# Patient Record
Sex: MALE | Race: WHITE | NOT HISPANIC OR LATINO | Employment: FULL TIME | URBAN - METROPOLITAN AREA
[De-identification: names, ages, dates, MRNs, and addresses within clinical notes are randomized per-mention and may not be internally consistent; named-entity substitution may affect disease eponyms.]

---

## 2017-01-20 ENCOUNTER — ALLSCRIPTS OFFICE VISIT (OUTPATIENT)
Dept: OTHER | Facility: OTHER | Age: 57
End: 2017-01-20

## 2017-01-27 ENCOUNTER — ALLSCRIPTS OFFICE VISIT (OUTPATIENT)
Dept: RADIOLOGY | Facility: CLINIC | Age: 57
End: 2017-01-27
Payer: COMMERCIAL

## 2017-01-27 PROCEDURE — 77003 FLUOROGUIDE FOR SPINE INJECT: CPT | Performed by: ANESTHESIOLOGY

## 2017-02-03 ENCOUNTER — ALLSCRIPTS OFFICE VISIT (OUTPATIENT)
Dept: OTHER | Facility: OTHER | Age: 57
End: 2017-02-03

## 2017-02-03 DIAGNOSIS — M54.42 LOW BACK PAIN WITH LEFT-SIDED SCIATICA: ICD-10-CM

## 2017-02-10 ENCOUNTER — ALLSCRIPTS OFFICE VISIT (OUTPATIENT)
Dept: RADIOLOGY | Facility: CLINIC | Age: 57
End: 2017-02-10
Payer: COMMERCIAL

## 2017-02-10 PROCEDURE — 77003 FLUOROGUIDE FOR SPINE INJECT: CPT | Performed by: ANESTHESIOLOGY

## 2017-02-17 ENCOUNTER — HOSPITAL ENCOUNTER (OUTPATIENT)
Dept: MRI IMAGING | Facility: HOSPITAL | Age: 57
Discharge: HOME | End: 2017-02-17
Attending: ANESTHESIOLOGY
Payer: COMMERCIAL

## 2017-02-17 ENCOUNTER — GENERIC CONVERSION - ENCOUNTER (OUTPATIENT)
Dept: OTHER | Facility: OTHER | Age: 57
End: 2017-02-17

## 2017-02-17 DIAGNOSIS — M54.42 LOW BACK PAIN WITH LEFT-SIDED SCIATICA: ICD-10-CM

## 2017-02-17 PROCEDURE — 72148 MRI LUMBAR SPINE W/O DYE: CPT

## 2017-02-20 ENCOUNTER — GENERIC CONVERSION - ENCOUNTER (OUTPATIENT)
Dept: OTHER | Facility: OTHER | Age: 57
End: 2017-02-20

## 2017-02-22 ENCOUNTER — GENERIC CONVERSION - ENCOUNTER (OUTPATIENT)
Dept: OTHER | Facility: OTHER | Age: 57
End: 2017-02-22

## 2017-02-24 ENCOUNTER — ALLSCRIPTS OFFICE VISIT (OUTPATIENT)
Dept: RADIOLOGY | Facility: CLINIC | Age: 57
End: 2017-02-24
Payer: COMMERCIAL

## 2017-03-10 ENCOUNTER — GENERIC CONVERSION - ENCOUNTER (OUTPATIENT)
Dept: OTHER | Facility: OTHER | Age: 57
End: 2017-03-10

## 2017-03-10 ENCOUNTER — HOSPITAL ENCOUNTER (OUTPATIENT)
Dept: RADIOLOGY | Facility: HOSPITAL | Age: 57
Discharge: HOME | End: 2017-03-10
Payer: COMMERCIAL

## 2017-03-10 ENCOUNTER — ALLSCRIPTS OFFICE VISIT (OUTPATIENT)
Dept: OTHER | Facility: OTHER | Age: 57
End: 2017-03-10

## 2017-03-10 ENCOUNTER — TRANSCRIBE ORDERS (OUTPATIENT)
Dept: ADMINISTRATIVE | Facility: HOSPITAL | Age: 57
End: 2017-03-10

## 2017-03-10 DIAGNOSIS — J06.9 ACUTE UPPER RESPIRATORY INFECTION: ICD-10-CM

## 2017-03-10 DIAGNOSIS — F17.200 TOBACCO USE DISORDER: ICD-10-CM

## 2017-03-10 DIAGNOSIS — R05.9 COUGH: Primary | ICD-10-CM

## 2017-03-10 PROCEDURE — 71020 HB CHEST X-RAY 2VW FRONTAL&LATL: CPT

## 2017-03-13 ENCOUNTER — GENERIC CONVERSION - ENCOUNTER (OUTPATIENT)
Dept: OTHER | Facility: OTHER | Age: 57
End: 2017-03-13

## 2017-03-31 ENCOUNTER — GENERIC CONVERSION - ENCOUNTER (OUTPATIENT)
Dept: OTHER | Facility: OTHER | Age: 57
End: 2017-03-31

## 2017-03-31 ENCOUNTER — HOSPITAL ENCOUNTER (OUTPATIENT)
Dept: PULMONOLOGY | Facility: HOSPITAL | Age: 57
Discharge: HOME | End: 2017-03-31
Payer: COMMERCIAL

## 2017-03-31 DIAGNOSIS — F17.200 TOBACCO USE DISORDER: ICD-10-CM

## 2017-03-31 DIAGNOSIS — R05.9 COUGH: ICD-10-CM

## 2017-03-31 PROCEDURE — 94760 N-INVAS EAR/PLS OXIMETRY 1: CPT

## 2017-03-31 PROCEDURE — 94729 DIFFUSING CAPACITY: CPT

## 2017-03-31 PROCEDURE — 94726 PLETHYSMOGRAPHY LUNG VOLUMES: CPT

## 2017-03-31 PROCEDURE — 94060 EVALUATION OF WHEEZING: CPT

## 2017-03-31 RX ORDER — ALBUTEROL SULFATE 2.5 MG/3ML
2.5 SOLUTION RESPIRATORY (INHALATION) ONCE
Status: DISCONTINUED | OUTPATIENT
Start: 2017-03-31 | End: 2017-04-04 | Stop reason: HOSPADM

## 2017-04-03 ENCOUNTER — GENERIC CONVERSION - ENCOUNTER (OUTPATIENT)
Dept: OTHER | Facility: OTHER | Age: 57
End: 2017-04-03

## 2017-04-13 ENCOUNTER — ALLSCRIPTS OFFICE VISIT (OUTPATIENT)
Dept: OTHER | Facility: OTHER | Age: 57
End: 2017-04-13

## 2017-04-19 ENCOUNTER — GENERIC CONVERSION - ENCOUNTER (OUTPATIENT)
Dept: OTHER | Facility: OTHER | Age: 57
End: 2017-04-19

## 2017-04-28 ENCOUNTER — ALLSCRIPTS OFFICE VISIT (OUTPATIENT)
Dept: OTHER | Facility: OTHER | Age: 57
End: 2017-04-28

## 2017-04-28 DIAGNOSIS — M54.42 LOW BACK PAIN WITH LEFT-SIDED SCIATICA: ICD-10-CM

## 2017-04-28 DIAGNOSIS — J44.9 CHRONIC OBSTRUCTIVE PULMONARY DISEASE (HCC): ICD-10-CM

## 2017-04-28 DIAGNOSIS — F17.200 NICOTINE DEPENDENCE, UNCOMPLICATED: ICD-10-CM

## 2017-04-28 DIAGNOSIS — M47.26 OTHER SPONDYLOSIS WITH RADICULOPATHY, LUMBAR REGION: ICD-10-CM

## 2017-04-28 DIAGNOSIS — M48.02 SPINAL STENOSIS OF CERVICAL REGION: ICD-10-CM

## 2017-05-09 ENCOUNTER — ALLSCRIPTS OFFICE VISIT (OUTPATIENT)
Dept: OTHER | Facility: OTHER | Age: 57
End: 2017-05-09

## 2017-05-19 ENCOUNTER — HOSPITAL ENCOUNTER (OUTPATIENT)
Dept: RADIOLOGY | Facility: HOSPITAL | Age: 57
Discharge: HOME | End: 2017-05-19
Attending: NEUROLOGICAL SURGERY
Payer: COMMERCIAL

## 2017-05-19 ENCOUNTER — LAB REQUISITION (OUTPATIENT)
Dept: LAB | Facility: HOSPITAL | Age: 57
End: 2017-05-19
Payer: COMMERCIAL

## 2017-05-19 ENCOUNTER — TRANSCRIBE ORDERS (OUTPATIENT)
Dept: ADMINISTRATIVE | Facility: HOSPITAL | Age: 57
End: 2017-05-19

## 2017-05-19 ENCOUNTER — ALLSCRIPTS OFFICE VISIT (OUTPATIENT)
Dept: OTHER | Facility: OTHER | Age: 57
End: 2017-05-19

## 2017-05-19 DIAGNOSIS — F17.200 NICOTINE DEPENDENCE, UNCOMPLICATED: ICD-10-CM

## 2017-05-19 DIAGNOSIS — M54.42 LOW BACK PAIN WITH LEFT-SIDED SCIATICA: ICD-10-CM

## 2017-05-19 DIAGNOSIS — R53.83 OTHER FATIGUE: ICD-10-CM

## 2017-05-19 DIAGNOSIS — R63.4 ABNORMAL WEIGHT LOSS: ICD-10-CM

## 2017-05-19 DIAGNOSIS — M47.26 OTHER SPONDYLOSIS WITH RADICULOPATHY, LUMBAR REGION: ICD-10-CM

## 2017-05-19 DIAGNOSIS — M48.02 SPINAL STENOSIS OF CERVICAL REGION: ICD-10-CM

## 2017-05-19 LAB
ALBUMIN SERPL BCP-MCNC: 3.8 G/DL (ref 3.5–5)
ALP SERPL-CCNC: 74 U/L (ref 46–116)
ALT SERPL W P-5'-P-CCNC: 28 U/L (ref 12–78)
ANION GAP SERPL CALCULATED.3IONS-SCNC: 1 MMOL/L (ref 4–13)
AST SERPL W P-5'-P-CCNC: 21 U/L (ref 5–45)
BASOPHILS # BLD AUTO: 0.02 THOUSANDS/ΜL (ref 0–0.1)
BASOPHILS NFR BLD AUTO: 0 % (ref 0–1)
BILIRUB SERPL-MCNC: 0.4 MG/DL (ref 0.2–1)
BUN SERPL-MCNC: 14 MG/DL (ref 5–25)
CALCIUM SERPL-MCNC: 9.2 MG/DL (ref 8.3–10.1)
CHLORIDE SERPL-SCNC: 107 MMOL/L (ref 100–108)
CO2 SERPL-SCNC: 34 MMOL/L (ref 21–32)
CREAT SERPL-MCNC: 1.44 MG/DL (ref 0.6–1.3)
EOSINOPHIL # BLD AUTO: 0.25 THOUSAND/ΜL (ref 0–0.61)
EOSINOPHIL NFR BLD AUTO: 4 % (ref 0–6)
ERYTHROCYTE [DISTWIDTH] IN BLOOD BY AUTOMATED COUNT: 12.6 % (ref 11.6–15.1)
ERYTHROCYTE [SEDIMENTATION RATE] IN BLOOD: 2 MM/HOUR (ref 0–10)
GFR SERPL CREATININE-BSD FRML MDRD: 50.2 ML/MIN/1.73SQ M
GLUCOSE P FAST SERPL-MCNC: 87 MG/DL (ref 65–99)
HCT VFR BLD AUTO: 46.4 % (ref 36.5–49.3)
HGB BLD-MCNC: 15.7 G/DL (ref 12–17)
LYMPHOCYTES # BLD AUTO: 1.63 THOUSANDS/ΜL (ref 0.6–4.47)
LYMPHOCYTES NFR BLD AUTO: 24 % (ref 14–44)
MCH RBC QN AUTO: 33.2 PG (ref 26.8–34.3)
MCHC RBC AUTO-ENTMCNC: 33.8 G/DL (ref 31.4–37.4)
MCV RBC AUTO: 98 FL (ref 82–98)
MONOCYTES # BLD AUTO: 0.58 THOUSAND/ΜL (ref 0.17–1.22)
MONOCYTES NFR BLD AUTO: 8 % (ref 4–12)
NEUTROPHILS # BLD AUTO: 4.4 THOUSANDS/ΜL (ref 1.85–7.62)
NEUTS SEG NFR BLD AUTO: 64 % (ref 43–75)
PLATELET # BLD AUTO: 236 THOUSANDS/UL (ref 149–390)
PMV BLD AUTO: 11.5 FL (ref 8.9–12.7)
POTASSIUM SERPL-SCNC: 4.8 MMOL/L (ref 3.5–5.3)
PROT SERPL-MCNC: 7 G/DL (ref 6.4–8.2)
RBC # BLD AUTO: 4.73 MILLION/UL (ref 3.88–5.62)
SODIUM SERPL-SCNC: 142 MMOL/L (ref 136–145)
T4 FREE SERPL-MCNC: 1.05 NG/DL (ref 0.76–1.46)
TSH SERPL DL<=0.05 MIU/L-ACNC: 1.24 UIU/ML (ref 0.36–3.74)
WBC # BLD AUTO: 6.88 THOUSAND/UL (ref 4.31–10.16)

## 2017-05-19 PROCEDURE — 85025 COMPLETE CBC W/AUTO DIFF WBC: CPT | Performed by: NURSE PRACTITIONER

## 2017-05-19 PROCEDURE — 72114 X-RAY EXAM L-S SPINE BENDING: CPT

## 2017-05-19 PROCEDURE — 84443 ASSAY THYROID STIM HORMONE: CPT | Performed by: NURSE PRACTITIONER

## 2017-05-19 PROCEDURE — 85652 RBC SED RATE AUTOMATED: CPT | Performed by: NURSE PRACTITIONER

## 2017-05-19 PROCEDURE — 84439 ASSAY OF FREE THYROXINE: CPT | Performed by: NURSE PRACTITIONER

## 2017-05-19 PROCEDURE — 86255 FLUORESCENT ANTIBODY SCREEN: CPT | Performed by: NURSE PRACTITIONER

## 2017-05-19 PROCEDURE — 72052 X-RAY EXAM NECK SPINE 6/>VWS: CPT

## 2017-05-19 PROCEDURE — 80053 COMPREHEN METABOLIC PANEL: CPT | Performed by: NURSE PRACTITIONER

## 2017-05-19 PROCEDURE — 83516 IMMUNOASSAY NONANTIBODY: CPT | Performed by: NURSE PRACTITIONER

## 2017-05-19 PROCEDURE — 82784 ASSAY IGA/IGD/IGG/IGM EACH: CPT | Performed by: NURSE PRACTITIONER

## 2017-05-20 LAB
ENDOMYSIUM IGA SER QL: NEGATIVE
GLIADIN PEPTIDE IGA SER-ACNC: 3 UNITS (ref 0–19)
GLIADIN PEPTIDE IGG SER-ACNC: 9 UNITS (ref 0–19)
IGA SERPL-MCNC: 212 MG/DL (ref 90–386)
TTG IGA SER-ACNC: <2 U/ML (ref 0–3)
TTG IGG SER-ACNC: 3 U/ML (ref 0–5)

## 2017-05-23 ENCOUNTER — GENERIC CONVERSION - ENCOUNTER (OUTPATIENT)
Dept: OTHER | Facility: OTHER | Age: 57
End: 2017-05-23

## 2017-05-26 ENCOUNTER — ALLSCRIPTS OFFICE VISIT (OUTPATIENT)
Dept: OTHER | Facility: OTHER | Age: 57
End: 2017-05-26

## 2017-06-02 ENCOUNTER — ALLSCRIPTS OFFICE VISIT (OUTPATIENT)
Dept: OTHER | Facility: OTHER | Age: 57
End: 2017-06-02

## 2017-11-02 ENCOUNTER — GENERIC CONVERSION - ENCOUNTER (OUTPATIENT)
Dept: OTHER | Facility: OTHER | Age: 57
End: 2017-11-02

## 2017-12-01 ENCOUNTER — ALLSCRIPTS OFFICE VISIT (OUTPATIENT)
Dept: OTHER | Facility: OTHER | Age: 57
End: 2017-12-01

## 2017-12-05 NOTE — PROGRESS NOTES
Assessment    1  Severe chronic obstructive pulmonary disease (496) (J44 9)   2  Nicotine dependence (305 1) (F17 200)   3  Elevated blood pressure reading (796 2) (R03 0)   4  Chronic bilateral low back pain (724 2,338 29) (M54 5,G89 29)   5  Flu vaccine need (V04 81) (Z23)    Plan  Elevated blood pressure reading, Nicotine dependence, Severe chronic obstructivepulmonary disease    · Follow-up visit in 2 months Evaluation and Treatment  Follow-up  Status: Complete Done: 36NXW1475  Flu vaccine need    · Fluzone Quadrivalent Intramuscular Suspension  Need for 23-polyvalent pneumococcal polysaccharide vaccine    · Pneumovax 23 25 MCG/0 5ML Injection Injectable  PMH: History of cough    · Symbicort 160-4 5 MCG/ACT Inhalation Aerosol; INHALE 2 PUFFS TWICE DAILY  RINSE MOUTH AFTER USE    Discussion/Summary    Severe COPD stable at this time  Refill symbicort issued  Advised he f/u with pulmonary but he is resistant to do so given bills he has received  Spirometry will be due in March  full smoking cessation  He is making serious attempts with nicotine patch  elevated in office  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  Will need start of med at that time if BP still elevated  Advise he check outside of office when able  f/u with pain management but he is resistant to do so given bills received  Recommend minimal use of aleve as this is likely contributing to high BP and pneumovax given today  Possible side effects of new medications were reviewed with the patient/guardian today  The treatment plan was reviewed with the patient/guardian  The patient/guardian understands and agrees with the treatment plan      Chief Complaint  Patient is here today for follow up of chronic conditions described in HPI  History of Present Illness  States he has been OK  Still dealing w/pain in low back  Not pursuing specialist because he won't have surgery  working with him on work duties   Taking aleve regularly for back  Occ tylenol also  following with pulmonary  States he has gotten hit with too many bills from hospital  States he has constant tightness in breathing but not SOB  Still smoking but a lot less, down to 1/2ppd  Using nicotine patch  hx high blood pressure  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  Review of Systems   Constitutional: not feeling poorly  Cardiovascular: no chest pain-- and-- no palpitations  Respiratory: shortness of breath during exertion--   The patient presents with complaints of occasional episodes of cough  The patient presents with complaints of occasional episodes of wheezing  Active Problems    1  Bilateral low back pain with left-sided sciatica (724 3) (M54 42)   2  Cervical spondylolysis (756 19) (M43 02)   3  Cervical stenosis of spine (723 0) (M48 02)   4  Chronic bilateral low back pain (724 2,338 29) (M54 5,G89 29)   5  Chronic cervical pain (723 1,338 29) (M54 2,G89 29)   6  Chronic cervical radiculopathy (723 4) (M54 12)   7  Chronic pain of both lower extremities (729 5,338 29) (M79 604,M79 605,G89 29)   8  Elevated blood pressure reading (796 2) (R03 0)   9  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), 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)   10  Flu vaccine need (V04 81) (Z23)   11  Nicotine dependence (305 1) (F17 200)   12  Osteoarthritis of spine with radiculopathy, lumbar region (721 3) (M47 26)   13  Pain syndrome, chronic (338 4) (G89 4)   14  Seasonal allergies (477 9) (J30 2)   15  Severe chronic obstructive pulmonary disease (496) (J44 9)   16  Wears glasses (V49 89) (Z97 3)    Past Medical History  1  History of Acute bronchitis, unspecified organism (466 0) (J20 9)   2  History of Acute pain of left shoulder (719 41) (M25 512)   3  Acute upper respiratory infection (465 9) (J06 9)   4  Denied: History of Alcohol abuse   5  History of Bloating (787 3) (R14 0)   6  History of Cough (786 2) (R05)   7   History of abnormal weight loss (V13 89) (O60 758) 8  History of arthritis (V13 4) (Z87 39)   9  History of asthma (V12 69) (Z87 09)   10  History of cough   11  History of lateral epicondylitis of left elbow (V13 59) (Z87 39)   12  History of scabies (V12 09) (Z86 19)   13  Denied: History of substance abuse   14  Denied: History of Mental health problem   15  History of Other fatigue (780 79) (R53 83)   16  History of Shoulder impingement syndrome, left (726 2) (M75 42)   17  History of Skin rash (782 1) (R21)    The active problems and past medical history were reviewed and updated today  Surgical History  1  Denied: History Of Prior Surgery    Family History  Mother    1  Denied: Family history of alcoholism   2  Denied: Family history of substance abuse   3  Denied: Family history of Mental health problem  Father    4  Denied: Family history of alcoholism   5  Denied: Family history of substance abuse   6  Denied: Family history of Mental health problem  Brother    7  Family history of Prostate cancer  Family History    8  Family history of Guymon's chorea (V17 2) (Z82 0)   9  Family history of myocardial infarction (V17 3) (Z82 49)    Social History     · Always uses seat belt   · Current every day smoker (305 1) (F17 200)   · Drinks coffee   · Has 2 children   · High school or GED   · Lives with spouse   ·    · Occupation   · Rarely consumes alcohol (V49 89) (Z78 9)   · Denied: History of Social alcohol use  The social history was reviewed and updated today  Current Meds   1  Aleve 220 MG Oral Tablet; TAKE 4 TABLET 3 times daily PRN; Therapy: (Recorded:02Jun2017) to Recorded   2  BuPROPion HCl ER (SR) 150 MG Oral Tablet Extended Release 12 Hour; TAKE 1 TABLET EVERY MORNING; Therapy: 97Lgo8606 to (Last Rx:02Nov2017)  Requested for: 76HDG9994 Ordered   3  Incruse Ellipta 62 5 MCG/INH Inhalation Aerosol Powder Breath Activated; USE 1 INHALATION DAILY; Therapy: 06Arq3458 to (Last Rx:02Nov2017)  Requested for: 75ZRF4083 Ordered   4   Nicotine 21 MG/24HR Transdermal Patch 24 Hour; USE AS DIRECTED; Therapy: 52VHI7958 to (Last Rx:07Jun2017)  Requested for: 55Pkz4224 Ordered   5  ProAir  (90 Base) MCG/ACT Inhalation Aerosol Solution; INHALE 2 PUFFS EVERY 4-6 HOURS AS NEEDED; Therapy: 62DCX8007 to (Evaluate:13Mit1044)  Requested for: 09JVS6605; Last LA:05MNF6642 Ordered   6  Symbicort 160-4 5 MCG/ACT Inhalation Aerosol; INHALE 2 PUFFS TWICE DAILY  RINSE MOUTH AFTER USE; Therapy: 57CGJ9949 to (Evaluate:17Aug2017)  Requested for: 32WCV2538; Last Rx:68Jzy8976 Ordered   7  Tylenol Extra Strength 500 MG Oral Tablet Recorded    The medication list was reviewed and updated today  Allergies    1  No Known Drug Allergies    Vitals  Vital Signs    ** Printed in Appendix #1 below  Physical Exam   Constitutional  General appearance: No acute distress, well appearing and well nourished  Eyes  Conjunctiva and lids: No swelling, erythema, or discharge  Pulmonary  Respiratory effort: Abnormal   Respiratory rate: normal  Assessment of respiratory effort revealed normal rhythm and effort  Respiratory Findings: barky cough  Auscultation of lungs: Abnormal   Auscultation of the lungs revealed decreased breath sounds diffusely  Cardiovascular  Palpation of heart: Normal PMI, no thrills  Auscultation of heart: Normal rate and rhythm, normal S1 and S2, without murmurs  Carotid pulses: Normal   no bruit heard over the right carotid-- and-- no bruit heard over the left carotid  Lymphatic  Palpation of lymph nodes in neck: No lymphadenopathy  Attending Note  Collaborating Note: I agree with the Advanced Practitioner note  Future Appointments    Date/Time Provider Specialty Site   02/09/2018 08:20 AM MD Srinivas Garcia MD       Signatures   Electronically signed by :  ANDREA Magana; Dec  1 2017 12:54PM EST                       (Author)    Electronically signed by : Ravinder Simpson MD; Dec  1 2017  1:56PM KAILEE                       (Co-author)    Appendix #1  Vital Signs   Patient: Emily Salgado ; : 1960; MRN: [id number]   Recorded: 53YXX4172 09:59AM Recorded: 07GTZ8809 09:47AM Recorded: 57PJC5625 09:29AM   Temperature   96 4 F, Temporal   Heart Rate   96   Systolic  486 674, Sitting   Diastolic  94 88, Sitting   Height   5 ft 11 in   Weight   171 lb    BMI Calculated   23 85   BSA Calculated   1 97   O2 Saturation 96

## 2018-01-09 NOTE — MISCELLANEOUS
Message   Recorded as Task   Date: 11/02/2016 11:43 AM, Created By: Nicole Campos   Task Name: Care Coordination   Assigned To: [facility] [place] [facility]   Regarding Patient: Carrie Szymanski, Status: In Progress   Comment:    Mario Montoya - 02 Nov 2016 11:43 AM     TASK CREATED  Patient cervical MRI reveals severe foraminal stenosis at multiple levels, would consider cervical epidural steroid injection Ã?2 and surgical evaluation  If he is interested   Walker Schmitz - 02 Nov 2016 12:18 PM     TASK EDITED  *** FYI ***  S/w pt, advised of above  pt verbalized understanding  Requested an ov to discuss  Scheduled 15 min ov w/ DG on 11/21 at 1530  pt verbalized understanding  Walker Schmitz - 02 Nov 2016 12:19 PM     TASK REASSIGNED: Previously Assigned To SHELLIE Shi - 30 Nov 2016 4:19 PM     TASK REPLIED TO: Previously Assigned To Andre Canas  Provider aware  Thank you  Active Problems    1  Abnormal weight loss (783 21) (R63 4)   2  Acute pain of left shoulder (719 41) (M25 512)   3  Colon cancer screening (V76 51) (Z12 11)   4  Cough (786 2) (R05)   5  Encounter for prostate cancer screening (V76 44) (Z12 5)   6  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), 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)   (F19 90)   7  Flu vaccine need (V04 81) (Z23)   8  Lateral epicondylitis of left elbow (726 32) (M77 12)   9  Left cervical radiculopathy (723 4) (M54 12)   10  Low back pain (724 2) (M54 5)   11  Neck pain (723 1) (M54 2)   12  Need for diphtheria-tetanus-pertussis (Tdap) vaccine (V06 1) (Z23)   13  Screening for endocrine, nutritional, metabolic and immunity disorder (V77 99)    (Z13 29,Z13 0,Z13 21,Z13 228)   14  Shoulder impingement syndrome, left (726 2) (M75 42)   15  Skin rash (782 1) (R21)    Current Meds   1  Aleve 220 MG Oral Capsule; Therapy: 44NBO2595 to Recorded   2  Gabapentin 300 MG Oral Capsule; TAKE 1 CAPSULE AT BEDTIME;    Therapy: 39XOW0685 to (Evaluate:06Hni8969)  Requested for: 30ZIU6738; Last   Rx:21Oct2016 Ordered   3  Symbicort 160-4 5 MCG/ACT Inhalation Aerosol; INHALE 2 PUFFS TWICE DAILY  RINSE MOUTH AFTER USE; Therapy: 60FVR5900 to (Last Rx:73Wke6566)  Requested for: 53Uez3498 Ordered    Allergies    1   No Known Drug Allergies    Signatures   Electronically signed by : Manju Aguilera, ; Nov  3 2016 10:01AM EST                       (Author)

## 2018-01-10 NOTE — MISCELLANEOUS
Message   Recorded as Task   Date: 02/17/2017 03:11 PM, Created By: Nakita Garcia [facility]   Task Name: Care Coordination   Assigned To: [facility] [place] [facility]   Regarding Patient: Alyssa Jensen, Status: Active   Comment:    Mario Montoya - 17 Feb 2017 3:11 PM     TASK CREATED  Patient's MRI lumbar spine reveals significant bilateral foraminal stenosis at L5-S1 as well as mild to moderate L4-5, where is the patient's symptoms would consider an epidural steroid injection   Walker Schmitz - 17 Feb 2017 3:47 PM     TASK EDITED  s/w pt, advised of above  Per pt, pain is at the bottom of his spine, below his belt, more to the L  Pt c/o pain in L upper hip and thigh w/ walking or sitting up  Scheduled TFESI 2/24/2017 @ 1130 w/ SL  Reviewed pre procedure instructions; eat a light meal - npo 1 hour prior, , loose fitting clothing, cb if illness / abx start prior to procedure, pt verbalized understanding and denied blood thinning medication  ***TFESI??? Levels please  Mario Montoya - 17 Feb 2017 3:58 PM     TASK REPLIED TO: Previously Assigned To Mario Montoya  left l4/5   Walker Schmitz - 17 Feb 2017 4:06 PM     TASK EDITED  Updated in IDX        Active Problems    1  Abnormal weight loss (783 21) (R63 4)   2  Acute pain of left shoulder (719 41) (M25 512)   3  Bilateral low back pain with left-sided sciatica (724 3) (M54 42)   4  Cervical spondylolysis (756 19) (M43 02)   5  Cervical stenosis of spine (723 0) (M48 02)   6  Chronic bilateral low back pain (724 2,338 29) (M54 5,G89 29)   7  Chronic cervical pain (723 1,338 29) (M54 2,G89 29)   8  Chronic cervical radiculopathy (723 4) (M54 12)   9  Chronic pain of both lower extremities (729 5,338 29) (M79 604,M79 605,G89 29)   10  Colon cancer screening (V76 51) (Z12 11)   11  Cough (786 2) (R05)   12  Encounter for prostate cancer screening (V76 44) (Z12 5)   13  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), in remission (030 93)    (F14 65)   14   Flu vaccine need (V04 81) (Z23)   15  Lateral epicondylitis of left elbow (726 32) (M77 12)   16  Need for diphtheria-tetanus-pertussis (Tdap) vaccine (V06 1) (Z23)   17  Pain syndrome, chronic (338 4) (G89 4)   18  Screening for endocrine, nutritional, metabolic and immunity disorder (V77 99)    (Z13 29,Z13 0,Z13 21,Z13 228)   19  Shoulder impingement syndrome, left (726 2) (M75 42)   20  Skin rash (782 1) (R21)    Current Meds   1  Gabapentin 600 MG Oral Tablet; TAKE 1 TABLET AT BEDTIME; Therapy: 94KDT7064 to (Evaluate:21Mar2017)  Requested for: 20Jan2017; Last   Rx:20Jan2017 Ordered   2  Symbicort 160-4 5 MCG/ACT Inhalation Aerosol; INHALE 2 PUFFS TWICE DAILY  RINSE MOUTH AFTER USE; Therapy: 00ZTJ1385 to (Last Rx:75Scz3809)  Requested for: 04Syn9040 Ordered    Allergies    1   No Known Drug Allergies    Signatures   Electronically signed by : Mj Milner, ; Feb 17 2017  4:07PM EST                       (Author)

## 2018-01-10 NOTE — MISCELLANEOUS
Message   Recorded as Task   Date: 11/02/2016 11:43 AM, Created By: Jose Luna   Task Name: Care Coordination   Assigned To: [facility] [place] [facility]   Regarding Patient: Joaquín Nichols, Status: In Progress   Comment:    Mario Montoya - 02 Nov 2016 11:43 AM     TASK CREATED  Patient cervical MRI reveals severe foraminal stenosis at multiple levels, would consider cervical epidural steroid injection Ã?2 and surgical evaluation  If he is interested   Walker Schmitz - 02 Nov 2016 12:18 PM     TASK EDITED  *** FYI ***  S/w pt, advised of above  pt verbalized understanding  Requested an ov to discuss  Scheduled 15 min ov w/ DG on 11/21 at 1530  pt verbalized understanding  Walker Schmitz - 02 Nov 2016 12:19 PM     TASK REASSIGNED: Previously Assigned To SHELLIE Carolina - 98 Nov 2016 4:19 PM     TASK REPLIED TO: Previously Assigned To [facility]  Provider aware  Thank you  Active Problems    1  Abnormal weight loss (783 21) (R63 4)   2  Acute pain of left shoulder (719 41) (M25 512)   3  Colon cancer screening (V76 51) (Z12 11)   4  Cough (786 2) (R05)   5  Encounter for prostate cancer screening (V76 44) (Z12 5)   6  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), 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)   (F19 90)   7  Flu vaccine need (V04 81) (Z23)   8  Lateral epicondylitis of left elbow (726 32) (M77 12)   9  Left cervical radiculopathy (723 4) (M54 12)   10  Low back pain (724 2) (M54 5)   11  Neck pain (723 1) (M54 2)   12  Need for diphtheria-tetanus-pertussis (Tdap) vaccine (V06 1) (Z23)   13  Screening for endocrine, nutritional, metabolic and immunity disorder (V77 99)    (Z13 29,Z13 0,Z13 21,Z13 228)   14  Shoulder impingement syndrome, left (726 2) (M75 42)   15  Skin rash (782 1) (R21)    Current Meds   1  Aleve 220 MG Oral Capsule; Therapy: 65UDT0286 to Recorded   2  Gabapentin 300 MG Oral Capsule; TAKE 1 CAPSULE AT BEDTIME;    Therapy: 66WRI2084 to (Evaluate:88Hqm7805)  Requested for: 60YKR2419; Last   Rx:21Oct2016 Ordered   3  Symbicort 160-4 5 MCG/ACT Inhalation Aerosol; INHALE 2 PUFFS TWICE DAILY  RINSE MOUTH AFTER USE; Therapy: 52FNX0071 to (Last Rx:08Sep2016)  Requested for: 47Xpi6196 Ordered    Allergies    1   No Known Drug Allergies    Signatures   Electronically signed by : Guillermo Egan, ; Nov  3 2016  9:35AM EST                       (Author)

## 2018-01-10 NOTE — RESULT NOTES
Verified Results  (1) TSH 82WKQ5987 11:45AM Charles Solorio     Test Name Result Flag Reference   TSH 1 235 uIU/mL  0 358-3 740   This bloodwork is non-fasting  Please drink two glasses of water morning of  bloodwork  Patients undergoing fluorescein dye angiography may retain small amounts of fluorescein in the body for 48-72 hours post procedure  Samples containing fluorescein can produce falsely depressed TSH values  If the patient had this procedure,a specimen should be resubmitted post fluorescein clearance  (1) COMPREHENSIVE METABOLIC PANEL 12CVA4563 78:09SY Charles Solorio     Test Name Result Flag Reference   SODIUM 142 mmol/L  136-145   POTASSIUM 4 8 mmol/L  3 5-5 3   CHLORIDE 107 mmol/L  100-108   CARBON DIOXIDE 34 mmol/L H 21-32   ANION GAP (CALC) 1 mmol/L L 4-13   BLOOD UREA NITROGEN 14 mg/dL  5-25   CREATININE 1 44 mg/dL H 0 60-1 30   Standardized to IDMS reference method   CALCIUM 9 2 mg/dL  8 3-10 1   BILI, TOTAL 0 40 mg/dL  0 20-1 00   ALK PHOSPHATAS 74 U/L     ALT (SGPT) 28 U/L  12-78   AST(SGOT) 21 U/L  5-45   ALBUMIN 3 8 g/dL  3 5-5 0   TOTAL PROTEIN 7 0 g/dL  6 4-8 2   eGFR Non-African American 50 2 ml/min/1 73sq m     [facility] Disease Education Program recommendations are as follows:  GFR calculation is accurate only with a steady state creatinine  Chronic Kidney disease less than 60 ml/min/1 73 sq  meters  Kidney failure less than 15 ml/min/1 73 sq  meters     GLUCOSE FASTING 87 mg/dL  65-99     (1) SED RATE [id number] 11:45AM Charles Solorio     Test Name Result Flag Reference   SED RATE 2 mm/hour  0-10     (1) T4, FREE 70SDS0350 11:45AM Charles Solorio     Test Name Result Flag Reference   T4,FREE 1 05 ng/dL  0 76-1 46     (1) CELIAC DISEASE AB PROFILE [id number] 11:45AM Charles Solorio     Test Name Result Flag Reference   tTG IGG 3 U/mL  0 - 5   Negative        0 - 5                                Weak Positive   6 - 9                                Positive           >9 tTG IGA <2 U/mL  0 - 3   Negative        0 -  3                                Weak Positive   4 - 10                                Positive           >10   Tissue Transglutaminase (tTG) has been identified   as the endomysial antigen  Studies have demonstr-   ated that endomysial IgA antibodies have over 99%   specificity for gluten sensitive enteropathy  GLIADA 3 units  0 - 19   Negative                   0 - 19                     Weak Positive             20 - 30                     Moderate to Strong Positive   >30   GLIADG 9 units  0 - 19   Negative                   0 - 19                     Weak Positive             20 - 30                     Moderate to Strong Positive   >30   ENDOMYSIAL AB IGA Negative  Negative   Performed at:  [address] [place] [address]  [id number]  : Reta Whitman MD, Phone:  [phone]    mg/dL  90 - 386     (1) CBC/PLT/DIFF 23EXG2148 12:00AM Italo Calal[facility]     Test Name Result Flag Reference   WBC COUNT 6 88 Thousand/uL  4 31-10 16   RBC COUNT 4 73 Million/uL  3 88-5 62   HEMOGLOBIN 15 7 g/dL  12 0-17 0   HEMATOCRIT 46 4 %  36 5-49 3   MCV 98 fL  82-98   MCH 33 2 pg  26 8-34 3   MCHC 33 8 g/dL  31 4-37 4   RDW 12 6 %  11 6-15 1   MPV 11 5 fL  8 9-12 7   PLATELET COUNT 273 Thousands/uL  149-390   NEUTROPHILS RELATIVE PERCENT 64 %  43-75   LYMPHOCYTES RELATIVE PERCENT 24 %  14-44   MONOCYTES RELATIVE PERCENT 8 %  4-12   EOSINOPHILS RELATIVE PERCENT 4 %  0-6   BASOPHILS RELATIVE PERCENT 0 %  0-1   NEUTROPHILS ABSOLUTE COUNT 4 40 Thousands/? ??L  1 85-7 62   LYMPHOCYTES ABSOLUTE COUNT 1 63 Thousands/? ??L  0 60-4 47   MONOCYTES ABSOLUTE COUNT 0 58 Thousand/? ??L  0 17-1 22   EOSINOPHILS ABSOLUTE COUNT 0 25 Thousand/? ??L  0 00-0 61   BASOPHILS ABSOLUTE COUNT 0 02 Thousands/? ??L  0 00-0 10   This bloodwork is non-fasting  Please drink two glasses of water morning of  bloodwork

## 2018-01-10 NOTE — RESULT NOTES
Verified Results  (1) CBC/PLT/DIFF 29Apr2016 12:00AM Cleo [facility]     Test Name Result Flag Reference   WBC 8 5 x10E3/uL  3 4-10 8   RBC 4 90 x10E6/uL  4 14-5 80   Hemoglobin 15 5 g/dL  12 6-17 7   Hematocrit 45 7 %  37 5-51 0   MCV 93 fL  79-97   MCH 31 6 pg  26 6-33 0   MCHC 33 9 g/dL  31 5-35 7   RDW 12 4 %  12 3-15 4   Platelets 932 Z63V3/GD  150-379   Neutrophils 63 %     Lymphs 19 %     Monocytes 7 %     Eos 10 %     Basos 1 %     Neutrophils (Absolute) 5 4 x10E3/uL  1 4-7 0   Lymphs (Absolute) 1 6 x10E3/uL  0 7-3 1   Monocytes(Absolute) 0 6 x10E3/uL  0 1-0 9   Eos (Absolute) 0 8 x10E3/uL H 0 0-0 4   Baso (Absolute) 0 1 x10E3/uL  0 0-0 2   Immature Granulocytes 0 %     Immature Grans (Abs) 0 0 x10E3/uL  0 0-0 1     (1) COMPREHENSIVE METABOLIC PANEL [id number] 02:31AG [facility]     Test Name Result Flag Reference   Glucose, Serum 85 mg/dL  65-99   BUN 15 mg/dL  6-24   Creatinine, Serum 1 20 mg/dL  0 76-1 27   eGFR If NonAfricn Am 66 mL/min/1 73  >59   eGFR If Africn Am 77 mL/min/1 73  >59   BUN/Creatinine Ratio 13  9-20   Sodium, Serum 141 mmol/L  134-144   Potassium, Serum 5 0 mmol/L  3 5-5 2   Chloride, Serum 102 mmol/L     Carbon Dioxide, Total 24 mmol/L  18-29   Calcium, Serum 9 5 mg/dL  8 7-10 2   Protein, Total, Serum 6 4 g/dL  6 0-8 5   Albumin, Serum 4 0 g/dL  3 5-5 5   Globulin, Total 2 4 g/dL  1 5-4 5   A/G Ratio 1 7  1 1-2 5   Bilirubin, Total 0 4 mg/dL  0 0-1 2   Alkaline Phosphatase, S 69 IU/L     AST (SGOT) 19 IU/L  0-40   ALT (SGPT) 14 IU/L  0-44     (1) LIPID PANEL, FASTING 29Apr2016 12:00AM [facility]     Test Name Result Flag Reference   Cholesterol, Total 104 mg/dL  100-199   Triglycerides 75 mg/dL  0-149   HDL Cholesterol 40 mg/dL  >39   According to ATP-III Guidelines, HDL-C >59 mg/dL is considered a  negative risk factor for CHD  VLDL Cholesterol Harley 15 mg/dL  5-40   LDL Cholesterol Calc 49 mg/dL  0-99   T   Chol/HDL Ratio 2 6 ratio units  0 0-5 0   T  Chol/HDL Ratio                                                             Men  Women                                               1/2 Avg  Risk  3 4    3 3                                                   Avg Risk  5 0    4 4                                                2X Avg  Risk  9 6    7 1                                                3X Avg  Risk 23 4   11 0     (1) TSH WITH FT4 REFLEX 29Apr2016 12:00AM [address]     Test Name Result Flag Reference   TSH 1 310 uIU/mL  0 450-4 500     (1) PSA (SCREEN) (Dx V76 44 Screen for Prostate Cancer) 67Yqp0685 12:00AM [address]     Test Name Result Flag Reference   Prostate Specific Ag, Serum 1 4 ng/mL  0 0-4 0   Roche ECLIA methodology  According to the American Urological Association, Serum PSA should  decrease and remain at undetectable levels after radical  prostatectomy  The AUA defines biochemical recurrence as an initial  PSA value 0 2 ng/mL or greater followed by a subsequent confirmatory  PSA value 0 2 ng/mL or greater  Values obtained with different assay methods or kits cannot be used  interchangeably  Results cannot be interpreted as absolute evidence  of the presence or absence of malignant disease  Pt aware1      1 Amended By: Lynn Artis;  May 02 2016 8:16 AM EST    Discussion/Summary   please notify pt that his Bw was all wnl - thyroid/prostate/suar/cholesterol/kidney and liver tests were all wnl

## 2018-01-11 NOTE — RESULT NOTES
Message   Recorded as Task   Date: 02/13/2017 08:33 AM, Created By: Cristy Callejas   Task Name: Follow Up   Assigned To: [facility] [place] [facility],Team   Regarding Patient: Osman Forbes, Status: In Progress   Comment:    Charlette Leal - 13 Feb 2017 8:33 AM     TASK CREATED  S/p CHAPO #2 on 2/10/17 w/SL in [facility]  F/u scheduled or 2/17/17 w/DG    Please call 8/16/40   Jerel Primrose - 17 Feb 9995 32:42 AM     TASK IN PROGRESS   Jerel Primrose - 17 Feb 7392 68:29 AM     TASK EDITED  1st attempt     Lm on VM to [facility] [place] [facility]   [facility] Primrose - 22 Feb 2846 2:72 AM     TASK EDITED  Patient was scheduled for a back injection  Will f/u after that          Signatures   Electronically signed by : José Miguel Toledo, ; Feb 22 2017  8:06AM EST                       (Author)

## 2018-01-11 NOTE — RESULT NOTES
Message   Please notify patient his CXR shows no sign of pneumonia and changes consistent w/COPD so he should schedule PFTs as I ordered and return in 1 month  Verified Results  * XR CHEST PA & LATERAL [id number] 02:43PM Elena Oliva Order Number: [id number]     Test Name Result Flag Reference   XR CHEST PA & LATERAL (Report)     CHEST - DUAL ENERGY     INDICATION: Dyspnea     COMPARISON: 1/11/2016     VIEWS: PA (including soft tissue/bone algorithms) and lateral projections     IMAGES: 5     FINDINGS:        Cardiomediastinal silhouette appears unremarkable  The lungs are clear though hyperinflated  No pneumothorax or pleural effusion  Visualized osseous structures appear within normal limits for the patient's age  IMPRESSION:   Hyperinflation suggesting COPD   No active pulmonary disease  Workstation performed: [id number]     Signed by:   Nicole Jacobs MD   3/10/17        Pt aware

## 2018-01-12 NOTE — MISCELLANEOUS
Message   Date: 21 Oct 2016 4:04 PM EST, Recorded By: Laurie Amaya   Calling For: [facility]   Caller: Mrs Romeo Newberry, Spouse   Reason: General Medical Question   Call received on [facility] triage line from Mrs Angela Newberry stated that she and the pt were just here in the office today to see Dr Meet Bergeron, and they can't find the rx that they were given  Asking if the rx was possibly sent to the pharmacy  Pt's wife did not recall what medication that was ordered by Dr Meet Bergeron  Looked up Dr Davina Garcia office note for pt for 10/21 and found rx for gabapentin 300mg on tab at [facility] ordered and sent to pharmacy on file  Pt's wife informed of same  Wife appreciative of call and will call pharmacy to see if rx ready for   Active Problems    1  Abnormal weight loss (783 21) (R63 4)   2  Acute pain of left shoulder (719 41) (M25 512)   3  Colon cancer screening (V76 51) (Z12 11)   4  Cough (786 2) (R05)   5  Encounter for prostate cancer screening (V76 44) (Z12 5)   6  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), 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)   (F19 90)   7  Flu vaccine need (V04 81) (Z23)   8  Lateral epicondylitis of left elbow (726 32) (M77 12)   9  Left cervical radiculopathy (723 4) (M54 12)   10  Low back pain (724 2) (M54 5)   11  Neck pain (723 1) (M54 2)   12  Need for diphtheria-tetanus-pertussis (Tdap) vaccine (V06 1) (Z23)   13  Screening for endocrine, nutritional, metabolic and immunity disorder (V77 99)    (Z13 29,Z13 0,Z13 21,Z13 228)   14  Shoulder impingement syndrome, left (726 2) (M75 42)   15  Skin rash (782 1) (R21)    Current Meds   1  Aleve 220 MG Oral Capsule; Therapy: 06TDH5967 to Recorded   2  Gabapentin 300 MG Oral Capsule; TAKE 1 CAPSULE AT BEDTIME; Therapy: 78ZFG5268 to (Izabella Horton)  Requested for: 34QDH5408; Last   Rx:21Oct2016 Ordered   3  Symbicort 160-4 5 MCG/ACT Inhalation Aerosol; INHALE 2 PUFFS TWICE DAILY     RINSE MOUTH AFTER USE; Therapy: 53UJN9009 to (Last Rx:08Sep2016)  Requested for: 10Sep2016 Ordered    Allergies    1   No Known Drug Allergies    Signatures   Electronically signed by : Lisbet Love RN; Oct 21 2016  4:13PM EST                       (Author)

## 2018-01-13 VITALS
SYSTOLIC BLOOD PRESSURE: 152 MMHG | HEIGHT: 70 IN | WEIGHT: 161 LBS | BODY MASS INDEX: 23.05 KG/M2 | TEMPERATURE: 98.2 F | HEART RATE: 82 BPM | DIASTOLIC BLOOD PRESSURE: 82 MMHG

## 2018-01-13 VITALS
WEIGHT: 162 LBS | HEIGHT: 71 IN | SYSTOLIC BLOOD PRESSURE: 152 MMHG | HEART RATE: 88 BPM | TEMPERATURE: 96.7 F | DIASTOLIC BLOOD PRESSURE: 84 MMHG | BODY MASS INDEX: 22.68 KG/M2

## 2018-01-13 VITALS
SYSTOLIC BLOOD PRESSURE: 136 MMHG | DIASTOLIC BLOOD PRESSURE: 82 MMHG | WEIGHT: 162 LBS | BODY MASS INDEX: 23.19 KG/M2 | TEMPERATURE: 98.2 F | HEIGHT: 70 IN | HEART RATE: 100 BPM

## 2018-01-13 VITALS
HEART RATE: 100 BPM | WEIGHT: 166 LBS | HEIGHT: 70 IN | SYSTOLIC BLOOD PRESSURE: 144 MMHG | DIASTOLIC BLOOD PRESSURE: 86 MMHG | BODY MASS INDEX: 23.77 KG/M2

## 2018-01-14 VITALS
WEIGHT: 163.81 LBS | HEIGHT: 70 IN | HEART RATE: 88 BPM | DIASTOLIC BLOOD PRESSURE: 78 MMHG | TEMPERATURE: 98.1 F | BODY MASS INDEX: 23.45 KG/M2 | SYSTOLIC BLOOD PRESSURE: 138 MMHG

## 2018-01-14 VITALS
HEIGHT: 71 IN | DIASTOLIC BLOOD PRESSURE: 82 MMHG | SYSTOLIC BLOOD PRESSURE: 138 MMHG | TEMPERATURE: 97.9 F | BODY MASS INDEX: 22.82 KG/M2 | HEART RATE: 92 BPM | WEIGHT: 163 LBS

## 2018-01-14 VITALS
OXYGEN SATURATION: 95 % | TEMPERATURE: 97 F | HEIGHT: 71 IN | HEART RATE: 102 BPM | SYSTOLIC BLOOD PRESSURE: 118 MMHG | BODY MASS INDEX: 22.4 KG/M2 | WEIGHT: 160 LBS | DIASTOLIC BLOOD PRESSURE: 70 MMHG | RESPIRATION RATE: 18 BRPM

## 2018-01-14 NOTE — PROGRESS NOTES
Assessment    1  Cervical stenosis of spine (723 0) (M48 02)   2  Severe chronic obstructive pulmonary disease (496) (J44 9)   3  Bilateral low back pain with left-sided sciatica (724 3) (M54 42)    Plan    · Follow-up visit in 6 months Evaluation and Treatment  Follow-up  Status: Hold For -  Scheduling  Requested for: 89JCF5618   Ordered; For: Bilateral low back pain with left-sided sciatica, Cervical stenosis of spine, Severe chronic obstructive pulmonary disease; Ordered By: Sohail Carreon Performed:  Due: 59MYU6324    Discussion/Summary    68-year-old gentleman with cervical spondylosis and stenosis without radiculopathy and myelopathy  His lumbar spondylosis and stenosis and spondylolisthesis with lumbar neurogenic claudication and left lumbar radiculopathy  I reviewed his history, physical examination and imaging in detail with him and his wife today  A total of 20 minutes is spent with the patient which more and 50% was spent in direct counseling and coordination of care  Patient remains clinically stable from both a cervical spondylosis and stenosis and lumbar spondylosis and stenosis  He is been diagnosed with severe COPD and is actively trying to quit cigarette smoking  Given the stability of his neurological symptoms I would recommend he exhaust nonsurgical pain management strategies and optimize his overall medical condition prior to considering any surgical intervention for cervical or lumbar spine  All her questions were answered to their satisfaction  I reviewed the signs and symptoms of progressive lumbar radiculopathy and cauda equina syndrome in addition to cervical radiculopathy and myelopathy and asked him to contact my office should any concerns arise  I'll see him in 6 months time for ongoing clinical surveillance  Both he and his wife are in agreement with this course of action     The patient, patient's family was counseled regarding diagnostic results, instructions for management, risk factor reductions, prognosis, patient and family education, impressions, importance of compliance with treatment  total time of encounter was 20 minutes and 17 minutes was spent counseling  The patient has the current Goals: Improved pain control and energy level  The patent has the current Barriers: Severe COPD  Unexplained weight loss  Patient is able to Self-Care  Self Referrals: No      Chief Complaint  Low back pain      History of Present Illness  59year-old gentleman in in February 2017  He presents today to review the results of upright plain films of the cervical and lumbar spine  His history of present illness, as reviewed in detail below, has not changed significantly aside from one episode of numbness in his right leg which spontaneously resolved  He is subsequently seen a pulmonologist and been diagnosed with severe COPD and is also being investigated for his recent weight loss and fatigability  "Pleasant 66-year-old  accompanied by his wife today  He has a long-standing history of neck pain which resulted in left arm pain and numbness  The arm pain has improved but the numbness persists  The last 6 months he has noticed increasing lower back pain and left-sided leg pain with bilateral buttock pain  This is become progressively worse  His past medical history is also significant for over 30 pound weight loss over the past year secondary to decreased appetite and easy fatigability  The patient has an extensive smoking history and has recently been diagnosed with severe COPD  He currently denies any pain or weakness in his arms but has numbness in the entire left arm  He denies any difficulties with fine motor task, handwriting or with balance  He describes 5-10/10 midline lower back pain which radiates to the right which is sharp  It seems to improve with sitting and becomes worse with standing   The pain will radiate into both buttocks and down the back of his left leg and into his foot  The pain in his leg is rated from 0-10/10 and when it is most severe, his left leg will give out  He denies any numbness in the left leg  He denies any weakness or numbness in the right leg  He denies any difficulties with bowel and bladder function or change in perineal sensation  Aleve is somewhat helpful with the pain in his lower back and legs  Gabapentin was minimally helpful and he could not tolerate the side effects  She had one lumbar epidural steroid injection which provided approximately one day of relief  He has not tried physical therapy  He does not want to use opioids  He presents today with an MRI of the cervical and lumbar spine and to discuss his surgical options "      Review of Systems    Constitutional: feeling tired, but no fever, not feeling poorly, no recent weight gain, no chills and no recent weight loss  Eyes: dryness of the eyes, but no eye pain, no eyesight problems, eyes not red, no purulent discharge from the eyes and no itching of the eyes  ENT: no complaints of earache, no hearing loss, no nosebleeds, no nasal discharge, no sore throat, no hoarseness  Cardiovascular: No complaints of slow heart rate, no fast heart rate, no chest pain, no palpitations, no leg claudication, no lower extremity  Respiratory: shortness of breath, but no cough, no orthopnea, no wheezing, no shortness of breath during exertion and no PND  Gastrointestinal: No complaints of abdominal pain, no constipation, no nausea or vomiting, no diarrhea or bloody stools  Genitourinary: No complaints of dysuria, no incontinence, no hesitancy, no nocturia, no genital lesion, no testicular pain     Musculoskeletal: limb pain (Bilateral leg more left than right), but no joint swelling, no myalgias, no joint stiffness and no limb swelling    The patient presents with complaints of arthralgias (Middle of lower back radiates into bilateral buttocks down bilateral legs, Neck pain radiates into bilateral shoulders, arms)  Integumentary: No complaints of skin rash or skin lesions, no itching, no skin wound, no dry skin  Neurological: numbness, tingling, limb weakness, difficulty walking and Bilateral legs/arms (more left than right), but no headache, no confusion, no dizziness, no convulsions and no fainting  Psychiatric: sleep disturbances (Sleeping more than usual), but not suicidal, no anxiety, no personality change, no depression and no emotional problems  Endocrine: muscle weakness, but no proptosis, no deepening of the voice, no hot flashes, no erectile dysfunction and no feelings of weakness  Hematologic/Lymphatic: a tendency for easy bleeding and a tendency for easy bruising, but no swollen glands and no swollen glands in the neck  ROS reviewed  Active Problems    1  Abnormal weight loss (783 21) (R63 4)   2  Bilateral low back pain with left-sided sciatica (724 3) (M54 42)   3  Bloating (787 3) (R14 0)   4  Cervical spondylolysis (756 19) (M43 02)   5  Cervical stenosis of spine (723 0) (M48 02)   6  Chronic bilateral low back pain (724 2,338 29) (M54 5,G89 29)   7  Chronic cervical pain (723 1,338 29) (M54 2,G89 29)   8  Chronic cervical radiculopathy (723 4) (M54 12)   9  Chronic pain of both lower extremities (729 5,338 29) (M79 604,M79 605,G89 29)   10  Cough (786 2) (R05)   11  Elevated blood pressure reading (796 2) (R03 0)   12  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), 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)    (F19 90)   13  Lateral epicondylitis of left elbow (726 32) (M77 12)   14  Nicotine dependence (305 1) (F17 200)   15  Osteoarthritis of spine with radiculopathy, lumbar region (721 3) (M47 26)   16  Other fatigue (780 79) (R53 83)   17  Pain syndrome, chronic (338 4) (G89 4)   18  Seasonal allergies (477 9) (J30 2)   19  Severe chronic obstructive pulmonary disease (496) (J44 9)   20  Wears glasses (V49 89) (Z97 3)    Past Medical History    1   History of Acute bronchitis, unspecified organism (466 0) (J20 9)   2  History of Acute pain of left shoulder (719 41) (M25 512)   3  Acute upper respiratory infection (465 9) (J06 9)   4  Denied: History of Alcohol abuse   5  History of Cough (786 2) (R05)   6  History of arthritis (V13 4) (Z87 39)   7  History of asthma (V12 69) (Z87 09)   8  History of scabies (V12 09) (Z86 19)   9  Denied: History of substance abuse   10  Denied: History of Mental health problem   11  History of Shoulder impingement syndrome, left (726 2) (M75 42)   12  History of Skin rash (782 1) (R21)    The active problems and past medical history were reviewed and updated today  Surgical History    1  Denied: History Of Prior Surgery    The surgical history was reviewed and updated today  Family History  Mother    1  Denied: Family history of alcoholism   2  Denied: Family history of substance abuse   3  Denied: Family history of Mental health problem  Father    4  Denied: Family history of alcoholism   5  Denied: Family history of substance abuse   6  Denied: Family history of Mental health problem  Brother    7  Family history of Prostate cancer  Family History    8  Family history of Geoff's chorea (V17 2) (Z82 0)   9  Family history of myocardial infarction (V17 3) (Z82 49)    The family history was reviewed and updated today  Social History    · Always uses seat belt   · Current every day smoker (305 1) (F17 200)   · Drinks coffee   · Has 2 children   · High school or GED   · Lives with spouse   ·    · Occupation   · Rarely consumes alcohol (V49 89) (Z78 9)   · Denied: History of Social alcohol use  The social history was reviewed and updated today  Current Meds   1  Aleve 220 MG Oral Tablet; TAKE 4 TABLET 3 times daily PRN; Therapy: (Recorded:37Xqd2202) to Recorded   2  Incruse Ellipta 62 5 MCG/INH Inhalation Aerosol Powder Breath Activated; INHALE 1   PUFF DAILY;    Therapy: 27Bcs0180 to (Evaluate:72Szm6614) Requested for: 95RIC1580; Last   Rx:19May2017 Ordered   3  ProAir  (90 Base) MCG/ACT Inhalation Aerosol Solution; INHALE 2 PUFFS   EVERY 4-6 HOURS AS NEEDED; Therapy: 12ZWT9879 to (Evaluate:81Ilz8023)  Requested for: 28WWD2037; Last   AI:15LER2426 Ordered   4  Symbicort 160-4 5 MCG/ACT Inhalation Aerosol; INHALE 2 PUFFS TWICE DAILY  RINSE   MOUTH AFTER USE; Therapy: 53WIX2280 to (Evaluate:17Aug2017)  Requested for: 50GHL0358; Last   Rx:19May2017 Ordered   5  Wellbutrin 75 MG TABS; Take 1 tablet daily; Therapy: (Recorded:26May2017) to Recorded    The medication list was reviewed and updated today  Allergies    1  No Known Drug Allergies    Vitals  Vital Signs    Recorded: 25TSC4585 01:25PM   Temperature 97 3 F   Heart Rate 80   Respiration 16   Systolic 326   Diastolic 80   Height 5 ft 11 in   Weight 162 lb    BMI Calculated 22 59   BSA Calculated 1 93   Pain Scale 5     Physical Exam     Constitutional   General appearance: Abnormal   underweight and appears older than stated age  Skin warm and dry  No rashes, lesions or ecchymosis  Neurologic - Mental Status: Alert and Oriented x3  Mood and affect: Affect is normal   Memory is intact  Motor System 5/5 power in upper and lower extremities  Motor System - Upper Extremities: Muscle tone: Normal bilaterally  Muscle Bulk: Normal bilaterally  Motor System - Lower Extremities: Muscle tone: Normal bilaterally  Muscle Bulk: Normal bilaterally  Reflexes:   Reflexes: Abnormal   Deep tendon reflexes: 1+ right biceps, 1+ left biceps, 1+ right triceps, 1+ left triceps, 0 right patella, 0 left patella, 0 right ankle jerk and 0 left ankle jerkno ankle clonus on the right and no ankle clonus on the left  Superficial/Primitive Reflexes: Babinski reflex absent on the right and Babinski reflex absent on the left  Milner sign was not present:   Coordination: Finger to nose was normal    Gait and Station: Able to heal toe gait and Romberg negative  Results/Data  Diagnostic Studies [facility]ed [facility]   I personally reviewed the xray in detail with the patient  X-ray Review Upright flexion extension film of the cervical spine dated May 19, 2017  Straightening of cervical lordosis with mild focal kyphosis at C2-C3  This was reduced is entirely and extension and appears stable in flexion  Multiple levels of degenerative disc disease and facet arthropathy  Upright flexion extension films lumbar spine dated May 19, 2017  Straightening of lumbar lordosis with multiple levels of degenerative disc disease and facet arthropathy  There complete loss of disc height at L4-5 and L5-S1  Grade 1 anterolisthesis at L3-4 which appears stable in flexion and extension  No obvious fracture or bony lesions  Future Appointments    Date/Time Provider Specialty Site   09/01/2017 10:40 AM Heather Mac DO Pulmonary Medicine [facility]   06/02/2017 09:30 AM ANDREA Tejada Pain Management [facility] SPINE   12/01/2017 01:30 PM MADAN Brush   Neurosurgery [facility] [address]   06/02/2017 11:00 AM Derian Mcmillan MD     Signatures   Electronically signed by : MADAN Calhoun ; May 26 2017  2:08PM EST                       (Author)

## 2018-01-14 NOTE — MISCELLANEOUS
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), 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)    (F19 90)   13  Flu vaccine need (V04 81) (Z23)   14  Lateral epicondylitis of left elbow (726 32) (M77 12)   15  Need for diphtheria-tetanus-pertussis (Tdap) vaccine (V06 1) (Z23)   16  Nicotine dependence (305 1) (F17 200)   17  Pain syndrome, chronic (338 4) (G89 4)   18  Screening for endocrine, nutritional, metabolic and immunity disorder (V77 99)    (Z13 29,Z13 0,Z13 21,Z13 228)   19  Severe chronic obstructive pulmonary disease (496) (J44 9)    Current Meds   1  Gabapentin 600 MG Oral Tablet; Take 1 pill every other night x 1 week, then STOP  Wait   one week, then start Nortriptyline; Therapy: 17FZQ3388 to (Evaluate:27Apr2017)  Requested for: 13Apr2017; Last   Rx:13Apr2017 Ordered   2  Incruse Ellipta 62 5 MCG/INH Inhalation Aerosol Powder Breath Activated; INHALE 1   PUFF DAILY; Therapy: 53Wxu6253 to (Evaluate:11May2017)  Requested for: 15Pgu8773; Last   Rx:11Apr2017 Ordered   3  Nortriptyline HCl - 25 MG Oral Capsule; Once off of gabapentin for 1 week, take 1 pill   every other night x 1 week, then 1 pill every night; Therapy: 33Wji4410 to (Evaluate:12Jun2017)  Requested for: 13Apr2017; Last   Rx:13Apr2017 Ordered   4  Symbicort 160-4 5 MCG/ACT Inhalation Aerosol; INHALE 2 PUFFS TWICE DAILY  RINSE MOUTH AFTER USE; Therapy: 06KUW5690 to (Last Rx:10Mar2017)  Requested for: 44ZAE9077 Ordered    Allergies    1   No Known Drug Allergies    Signatures   Electronically signed by : Evelyne Murillo, ; Apr 19 2017  3:07PM EST                       (Author)

## 2018-01-15 NOTE — RESULT NOTES
Message   Recorded as Task   Date: 04/02/2017 08:29 PM, Created By: Stacy Najera   Task Name: Follow Up   Assigned To: [facility],Team   Regarding Patient: Fred Bacon, Status: Active   Comment: Stacy Najera - 17 Apr 2017 8:29 PM     TASK CREATED  Please notify patient his pulmonary test shows severe COPD, so I am sending an rx for spiriva to pharmacy and also want him to see pulmonary for further management  I will enter consult now  Kayla March - 30 Apr 2017 3:35 PM     TASK REASSIGNED: Previously Assigned To [address]    Electronically signed by :  Jenifer Canseco, ; Apr  3 2017  3:53PM EST                       (Author)

## 2018-01-15 NOTE — MISCELLANEOUS
Message   Recorded as Task   Date: 03/01/2017 01:23 PM, Created By: Charlette Leal   Task Name: Follow Up   Assigned To: [facility]   Regarding Patient: Sheryle Alter, Status: In Progress   Comment:    Charlette Leal - 01 Mar 2017 1:23 PM     TASK CREATED  S/p Left L4, L5 TFESI on 2/24/17 w/SL in [facility]  F/u scheduled for 4/14/17 w/DG    Please call 3/3/17   Aria Reeder - 03 Mar 2017 9:23 AM     TASK EDITED  1st attempt to contact pt  LM for call back  Jose Mayes - 18 Mar 2017 9:23 AM     TASK IN PROGRESS   Jeremías Singhanchi - 09 Mar 0040 7:45 AM     TASK EDITED  Pt reports that he got relief for about 2 days and pain came back -     Any recommendations prior to f/u on 4/14? Mario Montoya - 09 Mar 2017 11:51 AM     TASK REPLIED TO: Previously Assigned To [facility],Team  could try LESi prior to f/u   Walker Schmitz - 09 Mar 2017 11:58 AM     TASK EDITED  LMOM to cb on home / cell   Walker Schmitz - 09 Mar 2017 11:58 AM     TASK IN PROGRESS   Walker Schmitz - 13 Mar 2017 10:30 AM     TASK EDITED  s/w pt, advised of above  pt states he needs to complete a respiratory test soon - that is a priority now  Advised pt, the lesi is available and different from the last procedure, if he would like to schedule it - call back to advise  Otherwise, confirmed 4/14 fu ov w/ DG  Pt verbalized understanding and appreciation  Active Problems    1  Abnormal weight loss (783 21) (R63 4)   2  Acute upper respiratory infection (465 9) (J06 9)   3  Bilateral low back pain with left-sided sciatica (724 3) (M54 42)   4  Cervical spondylolysis (756 19) (M43 02)   5  Cervical stenosis of spine (723 0) (M48 02)   6  Chronic bilateral low back pain (724 2,338 29) (M54 5,G89 29)   7  Chronic cervical pain (723 1,338 29) (M54 2,G89 29)   8  Chronic cervical radiculopathy (723 4) (M54 12)   9  Chronic pain of both lower extremities (729 5,338 29) (M79 604,M79 605,G89 29)   10   Colon cancer screening (V76 51) (Z12 11)   11  Cough (786 2) (R05)   12  Encounter for prostate cancer screening (V76 44) (Z12 5)   13  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), 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)    (F19 90)   14  Flu vaccine need (V04 81) (Z23)   15  Lateral epicondylitis of left elbow (726 32) (M77 12)   16  Need for diphtheria-tetanus-pertussis (Tdap) vaccine (V06 1) (Z23)   17  Nicotine dependence (305 1) (F17 200)   18  Pain syndrome, chronic (338 4) (G89 4)   19  Screening for endocrine, nutritional, metabolic and immunity disorder (V77 99)    (Z13 29,Z13 0,Z13 21,Z13 228)    Current Meds   1  Gabapentin 600 MG Oral Tablet; TAKE 1 TABLET AT BEDTIME; Therapy: 21VLM1692 to (Evaluate:21Mar2017)  Requested for: 20Jan2017; Last   Rx:20Jan2017 Ordered   2  Symbicort 160-4 5 MCG/ACT Inhalation Aerosol; INHALE 2 PUFFS TWICE DAILY  RINSE MOUTH AFTER USE; Therapy: 12IUF4382 to (Last Rx:10Mar2017)  Requested for: 59HTB0935 Ordered    Allergies    1   No Known Drug Allergies    Signatures   Electronically signed by : Cydney Gonzalez, ; Mar 13 2017 10:30AM EST                       (Author)

## 2018-01-16 NOTE — MISCELLANEOUS
Message   Recorded as Task   Date: 08/21/2017 02:23 PM, Created By: Kathleen Avitia   Task Name: Med Renewal Request   Assigned To: Karel Orellana   Regarding Patient: Danica Orellana, Status: Active   Comment: Marchmabel Avitia - 21 Aug 2017 2:23 PM     TASK CREATED  Please notify patient I refilled his meds and ask him to schedule med check - he was due to be seen in June  Parvin Mortensen - 21 Aug 2017 3:02 PM     TASK EDITED  Left message   LandryArianne - 21 Aug 2017 4:14 PM     TASK REPLIED TO: Previously Assigned To [facility]  Pt ia aware that he needs to come in, he will call back  Nimisha Cleary - 13 Sep 2017 7:36 AM     TASK COMPLETED   Kathleen Avitia - 02 Nov 2017 9:17 AM     TASK REACTIVATED   Marchmabel Avitia - 02 Nov 2017 9:18 AM     TASK REPLIED TO: Previously Assigned To SALLIE [employer] TEAM  He has yet to schedule, so please call and remind him to do so  Med refills issued x1 month only  Please leave on task list until he schedules  Gretchen Wright - 02 Nov 2017 9:54 AM     TASK REPLIED TO: Previously Assigned To [address] for callback   Parvin Mortensen - 02 Nov 2017 9:55 AM     TASK REASSIGNED: Previously Assigned To [facility]  Scheduled with you 12/01  Active Problems    1  Abnormal weight loss (783 21) (R63 4)   2  Bilateral low back pain with left-sided sciatica (724 3) (M54 42)   3  Bloating (787 3) (R14 0)   4  Cervical spondylolysis (756 19) (M43 02)   5  Cervical stenosis of spine (723 0) (M48 02)   6  Chronic bilateral low back pain (724 2,338 29) (M54 5,G89 29)   7  Chronic cervical pain (723 1,338 29) (M54 2,G89 29)   8  Chronic cervical radiculopathy (723 4) (M54 12)   9  Chronic pain of both lower extremities (729 5,338 29) (M79 604,M79 605,G89 29)   10  Cough (786 2) (R05)   11  Elevated blood pressure reading (796 2) (R03 0)   12   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), 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)   13  Lateral epicondylitis of left elbow (726 32) (M77 12)   14  Nicotine dependence (305 1) (F17 200)   15  Osteoarthritis of spine with radiculopathy, lumbar region (721 3) (M47 26)   16  Other fatigue (780 79) (R53 83)   17  Pain syndrome, chronic (338 4) (G89 4)   18  Seasonal allergies (477 9) (J30 2)   19  Severe chronic obstructive pulmonary disease (496) (J44 9)   20  Wears glasses (V49 89) (Z97 3)    Current Meds   1  Aleve 220 MG Oral Tablet; TAKE 4 TABLET 3 times daily PRN; Therapy: (Recorded:02Jun2017) to Recorded   2  BuPROPion HCl ER (SR) 150 MG Oral Tablet Extended Release 12 Hour; TAKE 1   TABLET EVERY MORNING; Therapy: 94Lkv9786 to (Last Rx:02Nov2017)  Requested for: 44LQV2619 Ordered   3  Incruse Ellipta 62 5 MCG/INH Inhalation Aerosol Powder Breath Activated; USE 1   INHALATION DAILY; Therapy: 65Hjr7026 to (Last Rx:02Nov2017)  Requested for: 46TAK3846 Ordered   4  Nicotine 21 MG/24HR Transdermal Patch 24 Hour; USE AS DIRECTED; Therapy: 68VBT6744 to (Last Rx:07Jun2017)  Requested for: 07Jun2017 Ordered   5  ProAir  (90 Base) MCG/ACT Inhalation Aerosol Solution; INHALE 2 PUFFS   EVERY 4-6 HOURS AS NEEDED; Therapy: 74LBD9413 to (Evaluate:35Die1715)  Requested for: 40SNU7558; Last   RB:82JBV7349 Ordered   6  Symbicort 160-4 5 MCG/ACT Inhalation Aerosol; INHALE 2 PUFFS TWICE DAILY  RINSE MOUTH AFTER USE; Therapy: 52IVM8984 to (Evaluate:17Dot8762)  Requested for: 42MEW3289; Last   Rx:52Evs8056 Ordered   7  Tylenol Extra Strength 500 MG Oral Tablet Recorded   8  Wellbutrin 75 MG TABS (BuPROPion HCl); Take 1 tablet daily; Therapy: (Recorded:17Sib9355) to Recorded    Allergies    1  No Known Drug Allergies    Signatures   Electronically signed by :  ANDREA Sanchez; Nov 2 2017 12:39PM EST                       (Author)

## 2018-01-16 NOTE — PROGRESS NOTES
Assessment    1  Acute upper respiratory infection (465 9) (J06 9)   2  Nicotine dependence (305 1) (F17 200)   3  Cough (786 2) (R05)    Plan   Acute upper respiratory infection    · * XR CHEST PA & LATERAL; Status:Resulted - Requires Verification;   Done: 74HEW5956  02:43PM  Cough    · Symbicort 160-4 5 MCG/ACT Inhalation Aerosol; INHALE 2 PUFFS TWICE  DAILY  RINSE MOUTH AFTER USE  Nicotine dependence    · You need to quit smoking ; Status:Complete;   Done: 89QXV7845    Complete PFT; Status:Hold For - Scheduling; Requested for:10Mar2017;   Perform:[facility]; Due:10Mar2018; Ordered; For:Cough, Nicotine dependence; Ordered By:Uma Orellana; Follow-up visit in 1 month Evaluation and Treatment  Follow-up  Status: Hold For - Scheduling  Requested for: 71ADA5163  Ordered; For: Cough, Nicotine dependence;  Ordered By: Qian David  Performed:   Due: 74MFV9028     Discussion/Summary    Viral URI resolving now with probable COPD exacerbation  Pt heavy smoker, so will do STAT CXR now to r/o pneumonia and order given to schedule PFTs  Lengthy discussion w/patient on need to contemplate smoking cessation and look into Chantix approval with employer  Return in 1 month to review PFT results and plan for smoking cessation  Patient is able to Self-Care  Possible side effects of new medications were reviewed with the patient/guardian today  The treatment plan was reviewed with the patient/guardian  The patient/guardian understands and agrees with the treatment plan      Chief Complaint  ? 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          Review of Systems    Constitutional: no fever and not feeling poorly  Respiratory: shortness of breath and cough, but as noted in HPI and no wheezing  Active Problems    1  Abnormal weight loss (783 21) (R63 4)   2  Bilateral low back pain with left-sided sciatica (724 3) (M54 42)   3  Cervical spondylolysis (756 19) (M43 02)   4  Cervical stenosis of spine (723 0) (M48 02)   5  Chronic bilateral low back pain (724 2,338 29) (M54 5,G89 29)   6  Chronic cervical pain (723 1,338 29) (M54 2,G89 29)   7  Chronic cervical radiculopathy (723 4) (M54 12)   8  Chronic pain of both lower extremities (729 5,338 29) (M79 604,M79 605,G89 29)   9  Colon cancer screening (V76 51) (Z12 11)   10  Encounter for prostate cancer screening (V76 44) (Z12 5)   11  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), 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)    (F19 90)   12  Flu vaccine need (V04 81) (Z23)   13  Lateral epicondylitis of left elbow (726 32) (M77 12)   14  Need for diphtheria-tetanus-pertussis (Tdap) vaccine (V06 1) (Z23)   15  Pain syndrome, chronic (338 4) (G89 4)   16  Screening for endocrine, nutritional, metabolic and immunity disorder (V77 99)    (Z13 29,Z13 0,Z13 21,Z13 228)    Past Medical History    1  History of Acute bronchitis, unspecified organism (466 0) (J20 9)   2  History of Acute pain of left shoulder (719 41) (M25 512)   3  History of Cough (786 2) (R05)   4  History of arthritis (V13 4) (Z87 39)   5  History of asthma (V12 69) (Z87 09)   6  History of scabies (V12 09) (Z86 19)   7  History of Shoulder impingement syndrome, left (726 2) (M75 42)   8  History of Skin rash (782 1) (R21)  Active Problems And Past Medical History Reviewed: The active problems and past medical history were reviewed and updated today  Family History  Brother    1   Family history of Prostate cancer    Social History    · Current every day smoker (305 1) (F17 200)   ·    · Social alcohol use (Z78 9)  The social history was reviewed and updated today  Current Meds   1  Gabapentin 600 MG Oral Tablet; TAKE 1 TABLET AT BEDTIME; Therapy: 40JAM2605 to (Evaluate:21Mar2017)  Requested for: 20Jan2017; Last   Rx:20Jan2017 Ordered   2  Symbicort 160-4 5 MCG/ACT Inhalation Aerosol; INHALE 2 PUFFS TWICE DAILY  RINSE MOUTH AFTER USE; Therapy: 20IQA3818 to (Last Rx:72Csp2010)  Requested for: 32Ofl0086 Ordered    The medication list was reviewed and updated today  Allergies    1  No Known Drug Allergies    Vitals   Recorded: 20WES1480 02:11PM   Temperature 97 9 F, Tympanic   Heart Rate 100, L Radial   Pulse Quality Regular   Systolic 898, LUE, Sitting   Diastolic 68, LUE, Sitting   Height 5 ft 10 in   Weight 166 lb 12 8 oz   BMI Calculated 23 93   BSA Calculated 1 94     Physical Exam    Constitutional   General appearance: No acute distress, well appearing and well nourished  Eyes   Conjunctiva and lids: No swelling, erythema, or discharge  Pulmonary   Respiratory effort: Abnormal   Respiratory rate: normal  Assessment of respiratory effort revealed normal rhythm and effort  Respiratory Findings: wet cough  Auscultation of lungs: Abnormal   Auscultation of the lungs revealed decreased breath sounds diffusely and expiratory wheezing  Cardiovascular   Palpation of heart: Normal PMI, no thrills  Auscultation of heart: Normal rate and rhythm, normal S1 and S2, without murmurs  Lymphatic   Palpation of lymph nodes in neck: No lymphadenopathy  Psychiatric   Mood and affect: Normal          Results/Data  * XR CHEST PA & LATERAL 07WEC1364 02:43PM [facility] Order Number: [id number]     Test Name Result Flag Reference   XR CHEST PA & LATERAL (Report)     CHEST - DUAL ENERGY     INDICATION: Dyspnea     COMPARISON: 1/11/2016     VIEWS: PA (including soft tissue/bone algorithms) and lateral projections     IMAGES: 5     FINDINGS:        Cardiomediastinal silhouette appears unremarkable       The lungs are clear though hyperinflated  No pneumothorax or pleural effusion  Visualized osseous structures appear within normal limits for the patient's age  IMPRESSION:   Hyperinflation suggesting COPD   No active pulmonary disease  Workstation performed: [id number]     Signed by:   Siobhan De Santiago MD   3/10/17     PHQ-2 Adult Depression Screening 67EKK7682 02:06PM User, Ahs     Test Name Result Flag Reference   PHQ-2 Adult Depression Score 0     Over the last two weeks, how often have you been bothered by any of the following problems? Little interest or pleasure in doing things: Not at all - 0  Feeling down, depressed, or hopeless: Not at all - 0   PHQ-2 Adult Depression Screening Negative         Attending Note  Collaborating Physician Note: Collaborating Note: I agree with the Advanced Practitioner note  Future Appointments    Date/Time Provider Specialty Site   04/14/2017 09:45 AM ANDREA [facility] [place] [address]   04/28/2017 10:00 AM Jammie Orellana MD     Signatures   Electronically signed by :  ANDREA Eisenberg; Mar 10 2017  2:44PM EST                       (Author)    Electronically signed by : Cj Mccann MD; Mar 10 2017  3:24PM EST                       (Co-author)

## 2018-01-17 NOTE — PROGRESS NOTES
Assessment    1  Acute bronchitis, unspecified organism (466 0) (J20 9)    Plan  Acute bronchitis, unspecified organism    · Symbicort 160-4 5 MCG/ACT Inhalation Aerosol; INHALE 2 PUFFS TWICE DAILY  RINSE MOUTH AFTER USE   · 1 - Chidi Parkinson  (Pulmonary Disease) Physician Referral  Consult  New diagnosis COPD on chest xray  Status: Active  Requested for: 99YIY3110  Care Summary provided  : Yes    Discussion/Summary    Acute bronchitis with COPD as seen on chest xray  Will need PFT to confirm diagnosis  Continue with Levaquin and prednisone until completed  Pulmonary referral given  Sample of Symbicort given in meantime  Instructed on use  Continue with albuterol as needed  Smoking cessation advised  Discussed that COPD is progressive if he continues with smoking  Instructed to call with any worsening or new onset fever  Possible side effects of new medications were reviewed with the patient/guardian today  The treatment plan was reviewed with the patient/guardian  The patient/guardian understands and agrees with the treatment plan      Chief Complaint  The patient is here for an emergency room follow-up  History of Present Illness  Seen last week and sent to ER for evaluation due to tachypnea and oxygen desaturation  Had chest xray in ER which showed hyperinflation suggestive of COPD  No infiltrate  Pt states he was told there was a small area that looked like pneumonia but no diagnosis as such  Was started on Levaquin and prednisone which he is currently still taking  had blood cultures which were negative  States he is not feeling much better  Cough is improved  Still wheezing and feels sob  Denies fever,chills  Poor appetite  Losing weight  Has started with [employer] instant breakfast, Ensures and Special K protein bars  Was previously a 2 ppd smoker  States he hasn't had 2 packs since this started  Review of Systems    Constitutional: as noted in HPI  Respiratory: as noted in HPI  Gastrointestinal: as noted in HPI  Active Problems    1  Cough (786 2) (R05)   2  Scabies (133 0) (B86)    Social History    · Current every day smoker (305 1) (F17 200)  The social history was reviewed and updated today  The social history was reviewed and is unchanged  Current Meds   1  Aleve 220 MG Oral Capsule; Therapy: 53CRP4561 to Recorded   2  Levaquin 500 MG Oral Tablet; TAKE 1 TABLET DAILY AS DIRECTED; Therapy: 67UFJ2114 to (Evaluate:25Jan2016) Recorded   3  PredniSONE 10 MG Oral Tablet; Therapy: 76TBR4495 to Recorded    The medication list was reviewed and updated today  Allergies    1  No Known Drug Allergies    Vitals  Vital Signs [Data Includes: Current Encounter]    Recorded: 07OCS4902 04:24PM   Temperature 96 4 F   Heart Rate 80   Respiration 14   Systolic 290   Diastolic 80   Height 5 ft 11 in   Weight 157 lb    BMI Calculated 21 9   BSA Calculated 1 9     Physical Exam    Constitutional   General appearance: No acute distress, well appearing and well nourished  Pulmonary   Respiratory effort: No increased work of breathing or signs of respiratory distress  Auscultation of lungs: Abnormal   Auscultation of the lungs revealed decreased breath sounds diffusely, diffuse wheezing and expiratory wheezing  no rales or crackles were heard bilaterally  no rhonchi  no wheezing  Cardiovascular   Auscultation of heart: Normal rate and rhythm, normal S1 and S2, without murmurs  Lymphatic   Palpation of lymph nodes in neck: No lymphadenopathy      Psychiatric   Orientation to person, place and time: Normal     Mood and affect: Normal          Signatures   Electronically signed by : Lazarus Helling; Jan 18 2016  7:11PM EST                       (Author)    Electronically signed by : Flor Birch DO; Jan 19 2016  7:10AM EST                       (Author)

## 2018-01-17 NOTE — MISCELLANEOUS
Message   Recorded as Task   Date: 02/17/2017 11:01 AM, Created By: Graciela Griffin   Task Name: Miscellaneous   Assigned To: Graciela Griffin   Regarding Patient: Gwen Jean, Status: Active   [facility] 17 Feb 2017 11:01 AM     TASK CREATED  Patient missed his appt  2/17/17 @ 10:30 AM with Thang  I called the patient and he stated that he never set up this appt  He just finished his MRI & would not have scheduled for the same time  I just cancelled the appt  & told him to call next week for his MRI results  Thang Rose - 17 Feb 2017 11:38 AM     TASK REPLIED TO: Previously Assigned To Thang Rose  Provider aware  Thank you  Active Problems    1  Abnormal weight loss (783 21) (R63 4)   2  Acute pain of left shoulder (719 41) (M25 512)   3  Bilateral low back pain with left-sided sciatica (724 3) (M54 42)   4  Cervical spondylolysis (756 19) (M43 02)   5  Cervical stenosis of spine (723 0) (M48 02)   6  Chronic bilateral low back pain (724 2,338 29) (M54 5,G89 29)   7  Chronic cervical pain (723 1,338 29) (M54 2,G89 29)   8  Chronic cervical radiculopathy (723 4) (M54 12)   9  Chronic pain of both lower extremities (729 5,338 29) (M79 604,M79 605,G89 29)   10  Colon cancer screening (V76 51) (Z12 11)   11  Cough (786 2) (R05)   12  Encounter for prostate cancer screening (V76 44) (Z12 5)   13  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), 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)    (F19 90)   14  Flu vaccine need (V04 81) (Z23)   15  Lateral epicondylitis of left elbow (726 32) (M77 12)   16  Need for diphtheria-tetanus-pertussis (Tdap) vaccine (V06 1) (Z23)   17  Pain syndrome, chronic (338 4) (G89 4)   18  Screening for endocrine, nutritional, metabolic and immunity disorder (V77 99)    (Z13 29,Z13 0,Z13 21,Z13 228)   19  Shoulder impingement syndrome, left (726 2) (M55 42)   20  Skin rash (782 1) (R21)    Current Meds   1  Gabapentin 600 MG Oral Tablet; TAKE 1 TABLET AT BEDTIME;    Therapy: 24GSW6329 to (Evaluate:21Mar2017)  Requested for: 20Jan2017; Last   Rx:20Jan2017 Ordered   2  Symbicort 160-4 5 MCG/ACT Inhalation Aerosol; INHALE 2 PUFFS TWICE DAILY  RINSE MOUTH AFTER USE; Therapy: 51XBM4506 to (Last Rx:08Sep2016)  Requested for: 10Sep2016 Ordered    Allergies    1  No Known Drug Allergies    Signatures   Electronically signed by :  Hillary Gonzalez, ; Feb 20 2017  9:03AM EST                       (Author)

## 2018-01-18 NOTE — RESULT NOTES
Message    X-rays of neck and shoulder show mild arthritic changes so recommend he f/u with ortho and spine center as we discussed at his appt  Patient aware         Verified Results  * XR SHOULDER 2+ VIEW LEFT 63JPW9317 10:16AM [employer] Order Number: [id number]     Test Name Result Flag Reference   XR SHOULDER 2+ VW LEFT (Report)     LEFT SHOULDER     INDICATION: Left shoulder pain     COMPARISON: None     VIEWS: 3; 3 images     FINDINGS:     There is no acute fracture or dislocation  Mild degenerative changes of the acromion midclavicular joint  No lytic or blastic lesions are seen  Soft tissues are unremarkable  IMPRESSION:     No acute osseous abnormality  Workstation performed: [id number]     Signed by:   Fanny Gant MD   9/30/16     * XR SPINE CERVICAL COMPLETE 4 OR 5 VW NON INJURY 23GYF3570 10:16AM [employer] Order Number: [id number]     Test Name Result Flag Reference   XR SPINE CERVICAL COMPLETE 4 OR 5 VW (Report)     CERVICAL SPINE     INDICATION: Neck pain  COMPARISON: 8/2/2005     VIEWS: 5; 5 images     FINDINGS:     No evidence of fracture or subluxation  There is straightening of the normal cervical lordosis which can be seen in muscle spasm  There is disproportionate disc height loss at C3-4, C4-5 and C5-6  The neural foramina are patent  The prevertebral soft tissues are within normal limits  The lung apices are intact  IMPRESSION:       1  No acute osseous abnormality  Mild discogenic degenerative changes  2  Straightening of the normal cervical lordosis which can be seen in muscle spasm         Workstation performed: [id number]     Signed by:   Fanny Gant MD   9/30/16

## 2018-01-18 NOTE — CONSULTS
Assessment    1  Cervical stenosis of spine (723 0) (M48 02)   2  Nicotine dependence (305 1) (F17 200)   3  Severe chronic obstructive pulmonary disease (496) (J44 9)   4  Bilateral low back pain with left-sided sciatica (724 3) (M54 42)   5  Osteoarthritis of spine with radiculopathy, lumbar region (721 3) (M47 26)   6  Abnormal weight loss (783 21) (R63 4)    Plan  Abnormal weight loss, Severe chronic obstructive pulmonary disease    · Family Practice Follow Up Evaluation and Treatment  Follow-up  Status: Hold For -  Scheduling  Requested for: 28Apr2017   Ordered; For: Abnormal weight loss, Severe chronic obstructive pulmonary disease; Ordered By: Gama Power Performed:  Due: 58KJP3094  Bilateral low back pain with left-sided sciatica    · *1 - [facility]  *Core strengthening and range of motion  exercises  Status: Active  Requested for: 28Apr2017   Ordered; For: Bilateral low back pain with left-sided sciatica; Ordered By: Gama Power Performed:  Due: 79VEL2971  Care Summary provided  : Yes  Bilateral low back pain with left-sided sciatica, Cervical stenosis of spine, Nicotine  dependence, Osteoarthritis of spine with radiculopathy, lumbar region    · XR SPINE CERVICAL COMPLETE 6+ VW FLEX/EXT/OBL; Status:Active; Requested  for:28Apr2017;    Perform:[facility]; Due:28Apr2018; Ordered; For:Bilateral low back pain with left-sided sciatica, Cervical stenosis of spine, Nicotine dependence, Osteoarthritis of spine with radiculopathy, lumbar region; Ordered By:Adolfo Andrews;   · XR SPINE LUMBAR COMPLETE W BENDING MINIMUM 6 VIEWS; Status:Active; Requested for:28Apr2017;    Perform:[facility]; Due:28Apr2018; Ordered;   For:Bilateral low back pain with left-sided sciatica, Cervical stenosis of spine, Nicotine dependence, Osteoarthritis of spine with radiculopathy, lumbar region; Ordered By:Adolfo Andrews;   · Follow-up Visit in 4 Weeks Evaluation and Treatment  Follow-up  Status: Complete   Done: [id number]   Ordered; For: Bilateral low back pain with left-sided sciatica, Cervical stenosis of spine, Nicotine dependence, Osteoarthritis of spine with radiculopathy, lumbar region; Ordered By: Vesna Alaniz Performed:  Due: 45CNQ6419; Last Updated By: Bertrand Quezada; 4/28/2017 2:23:28 PM    Discussion/Summary    57-year-old gentleman with cervical spondylosis and stenosis without radiculopathy and myelopathy  His lumbar spondylosis and stenosis and spondylolisthesis with lumbar neurogenic claudication and left lumbar radiculopathy  I reviewed his history, physical examination and imaging in detail with him and his wife today  A total of 30 minutes is spent with the patient which more and 50% was spent in direct counseling and coordination of care  Over 3 minutes is spent discussing the impact of smoking on degenerative disc disease and perioperative morbidity  There number of smoking cessation strategies available to him which she has discussed recently with his PC P  With respect to cervical spondylosis and stenosis, the seems to be relatively asymptomatic at present  I explained that he may be fairly higher risk of acute spinal cord injury in the event of extreme range of motion of the cervical spine  This could result in ventilator dependence, quadriparesis or death  However surgical prophylactic decompression is not necessarily recommended  With respect to his lumbar spine, stenosis is probably contributing to his left-sided leg pain and symptoms concerning for lumbar neurogenic claudication  Intervention require lumbar decompression with instrumented fixation and fusion  He reports over 30 pound weight loss in the past year which was not intentional seems to be due to decreased appetite  There is associated fatigue  He is also recently been diagnosed with COPD and is scheduled to see a pulmonologist in early May   I would like him to follow-up with his PCP for investigations with respect to this weight loss, including possible endocrinology workup  Would also recommend he consider nutrition consult  Ideally, the patient should be medically optimized and stop smoking before considering any intervention for his lumbar spine  He will also begin physical therapy for core strengthening and range of motion exercises  All his questions were answered to his satisfaction  I will see him again in 4-6 weeks' time to check on his clinical progress with flexion extension films of the cervical and lumbar spine  He'll contact my office should any concerns arise in the interim  Both he and his wife are in agreement with this course of action  The patient has the current Goals: Improve pain control  The patent has the current Barriers: Weight loss with possible malnutrition  Ongoing tobacco use  Patient is able to Self-Care  Self Referrals: No      Chief Complaint  New patient referred by Sherif Greenberg for low back pain      History of Present Illness  Pleasant 49-year-old  accompanied by his wife today  He has a long-standing history of neck pain which resulted in left arm pain and numbness  The arm pain has improved but the numbness persists  The last 6 months he has noticed increasing lower back pain and left-sided leg pain with bilateral buttock pain  This is become progressively worse  His past medical history is also significant for over 30 pound weight loss over the past year secondary to decreased appetite and easy fatigability  The patient has an extensive smoking history and has recently been diagnosed with severe COPD  He currently denies any pain or weakness in his arms but has numbness in the entire left arm  He denies any difficulties with fine motor task, handwriting or with balance  He describes 5-10/10 midline lower back pain which radiates to the right which is sharp  It seems to improve with sitting and becomes worse with standing   The pain will radiate into both buttocks and down the back of his left leg and into his foot  The pain in his leg is rated from 0-10/10 and when it is most severe, his left leg will give out  He denies any numbness in the left leg  He denies any weakness or numbness in the right leg  He denies any difficulties with bowel and bladder function or change in perineal sensation  Aleve is somewhat helpful with the pain in his lower back and legs  Gabapentin was minimally helpful and he could not tolerate the side effects  She had one lumbar epidural steroid injection which provided approximately one day of relief  He has not tried physical therapy  He does not want to use opioids  He presents today with an MRI of the cervical and lumbar spine and to discuss his surgical options  Review of Systems    Constitutional: feeling tired, but no fever, not feeling poorly, no recent weight gain, no chills and no recent weight loss  Eyes: dryness of the eyes, but no eye pain, no eyesight problems, eyes not red, no purulent discharge from the eyes and no itching of the eyes  ENT: no complaints of earache, no hearing loss, no nosebleeds, no nasal discharge, no sore throat, no hoarseness  Cardiovascular: No complaints of slow heart rate, no fast heart rate, no chest pain, no palpitations, no leg claudication, no lower extremity  Respiratory: shortness of breath (COPD), but no cough, no orthopnea, no wheezing, no shortness of breath during exertion and no PND  Gastrointestinal: No complaints of abdominal pain, no constipation, no nausea or vomiting, no diarrhea or bloody stools  Genitourinary: No complaints of dysuria, no incontinence, no hesitancy, no nocturia, no genital lesion, no testicular pain     Musculoskeletal: limb pain (Bilateral legs), but no joint swelling, no myalgias, no joint stiffness and no limb swelling    The patient presents with complaints of arthralgias (Middle of lower back radiates into bilateral buttocks down bilateral legs)    Integumentary: No complaints of skin rash or skin lesions, no itching, no skin wound, no dry skin  Neurological: numbness, tingling, limb weakness (Tired, Trouble standing, makes it hard to walk), difficulty walking and Bilateral legs, but no headache, no confusion, no dizziness, no convulsions and no fainting  Psychiatric: not suicidal, no anxiety, no personality change, no sleep disturbances, no depression and no emotional problems  Endocrine: muscle weakness, but no proptosis, no deepening of the voice, no hot flashes, no erectile dysfunction and no feelings of weakness  Hematologic/Lymphatic: a tendency for easy bleeding and a tendency for easy bruising, but no swollen glands and no swollen glands in the neck  Active Problems     1  Abnormal weight loss (783 21) (R63 4)   2  Bilateral low back pain with left-sided sciatica (724 3) (M54 42)   3  Cervical spondylolysis (756 19) (M43 02)   4  Chronic cervical pain (723 1,338 29) (M54 2,G89 29)   5  Chronic cervical radiculopathy (723 4) (M54 12)   6  Chronic pain of both lower extremities (729 5,338 29) (M79 604,M79 605,G89 29)   7  Colon cancer screening (V76 51) (Z12 11)   8  Cough (786 2) (R05)   9  Encounter for prostate cancer screening (V76 44) (Z12 5)   10  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), 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)    (F19 90)   11  Flu vaccine need (V04 81) (Z23)   12  Lateral epicondylitis of left elbow (726 32) (M77 12)   13  Need for diphtheria-tetanus-pertussis (Tdap) vaccine (V06 1) (Z23)   14  Pain syndrome, chronic (338 4) (G89 4)   15  Screening for endocrine, nutritional, metabolic and immunity disorder (V77 99)    (Z13 29,Z13 0,Z13 21,Z13 228)    Chronic bilateral low back pain (724 2) (M54 5)       Cervical stenosis of spine (723 0) (M48 02)       Nicotine dependence (305 1) (F17 200)       Severe chronic obstructive pulmonary disease (496) (J44 9)          Past Medical History    1   History of Acute bronchitis, unspecified organism (466 0) (J20 9)   2  History of Acute pain of left shoulder (719 41) (M25 512)   3  Acute upper respiratory infection (465 9) (J06 9)   4  History of Cough (786 2) (R05)   5  History of arthritis (V13 4) (Z87 39)   6  History of asthma (V12 69) (Z87 09)   7  History of scabies (V12 09) (Z86 19)   8  History of Shoulder impingement syndrome, left (726 2) (M75 42)   9  History of Skin rash (782 1) (R21)    Surgical History    1  Denied: History Of Prior Surgery    Family History  Brother    1  Family history of Prostate cancer  Family History    2  Family history of Geoff's chorea (V17 2) (Z82 0)   3  Family history of myocardial infarction (V17 3) (Z82 49)    Social History    · Current every day smoker (305 1) (F17 200)   · Has 2 children   · High school or GED   · Lives with spouse   ·    · Occupation   · Rarely consumes alcohol (V49 89) (Z78 9)   · Denied: History of Social alcohol use    Current Meds   1  Aleve 220 MG Oral Tablet; TAKE 4 TABLET 3 times daily PRN; Therapy: (Recorded:28Apr2017) to Recorded   2  Incruse Ellipta 62 5 MCG/INH Inhalation Aerosol Powder Breath Activated; INHALE 1   PUFF DAILY; Therapy: 18Ccy1337 to (Evaluate:47Qqd0334)  Requested for: 11Apr2017; Last   Rx:11Apr2017 Ordered   3  Nortriptyline HCl - 25 MG Oral Capsule; Once off of gabapentin for 1 week, take 1 pill   every other night x 1 week, then 1 pill every night; Therapy: 91Kmc5075 to (Evaluate:12Jun2017)  Requested for: 13Apr2017; Last   Rx:13Apr2017 Ordered   4  Symbicort 160-4 5 MCG/ACT Inhalation Aerosol; INHALE 2 PUFFS TWICE DAILY  RINSE   MOUTH AFTER USE; Therapy: 17SRZ8552 to (Last Rx:10Mar2017)  Requested for: 78YDE1381 Ordered    Allergies    1   No Known Drug Allergies    Vitals  Vital Signs    Recorded: 28Apr2017 01:21PM   Temperature 97 7 F   Heart Rate 80   Respiration 16   Systolic 467   Diastolic 70   Height 5 ft 10 in   Weight 162 lb 8 oz   BMI Calculated 23 32 BSA Calculated 1 91   Pain Scale 8     Physical Exam     Constitutional   General appearance: Abnormal   underweight and appears older than stated age  Respiratory   Auscultation of lungs: Abnormal   (Diffuse expiratory wheezes) Auscultation of the lungs revealed prolonged expiratory time  Cardiovascular Auscultation of heart: No extra sounds  Musculo: Spine Cervical Spine: Normal   Lumbar/Sacral Spine: Normal    Skin warm and dry  No rashes, lesions or ecchymosis  Neurologic - Mental Status: Alert and Oriented x3  Mood and affect: Affect is normal   Memory is intact  Motor System 5/5 power in upper and lower extremities  Motor System - Upper Extremities: Muscle tone: Normal bilaterally  Muscle Bulk: Normal bilaterally  Motor System - Lower Extremities: Muscle tone: Normal bilaterally  Muscle Bulk: Normal bilaterally  Reflexes:   Reflexes: Abnormal   Deep tendon reflexes: 1+ right biceps, 1+ left biceps, 1+ right triceps, 1+ left triceps, 0 right patella, 0 left patella, 0 right ankle jerk and 0 left ankle jerkno ankle clonus on the right and no ankle clonus on the left  Superficial/Primitive Reflexes: Babinski reflex absent on the right and Babinski reflex absent on the left  Milner sign was not present:   Coordination: Finger to nose was normal    Sensory: Sensation grossly intact to light touch  Gait and Station: Able to heal toe gait and Romberg negative  Results/Data    I personally reviewed the mri, xray in detail with the patient  X-ray Review Upright plain film of the lumbar spine dated October 21, 2016  Mild concave left thoracolumbar scoliosis  Straightening of lumbar lordosis with grade 1 anterolisthesis at L3-4  Acid disc height at L3-4 and more severely at L4-5  Some loss of disc height at L5-S1  No evidence of bony fracture  MRI Review MRI of the lumbar spine without contrast dated fingers 17th 2017  Straightening of lumbar lordosis   Loss of disc height at L3-4 and L4-5 and to some extent at L5-S1  At L3-4 there is severe right lateral recess and foraminal stenosis with mild central canal stenosis and mild her left foraminal stenosis secondary to degenerative changes and anterolisthesis  At L4-5 there is left lateral recess and foraminal stenosis secondary to left paracentral disc herniation  This produces mild central canal stenosis  There is also mild right lateral recess stenosis secondary to degenerative changes  At L5-S1 there is moderate to severe right lateral recess and foraminal stenosis secondary to degenerative changes and moderate left lateral recess with mild foraminal stenosis secondary to broad-based disc bulge which is eccentric to the left  No other areas of significant neural compression  Mild redundancy of the nerve roots of the cauda equina above the L4-5 level  MRI of the cervical spine without contrast dated November 1, 2016  Straightening of cervical lordosis with mild grade 1 anterolisthesis of C3 on C4  Multiple levels of degenerative disc disease and facet arthropathy  At C3-4 there is mild by foraminal stenosis  At C4-5 there is moderate central with severe by foraminal stenosis secondary to degenerative changes and broad-based disc bulge  At C5-6 there is mild central and) left foraminal stenosis secondary to degenerative changes  At C6-7 there is mild central and right greater than left foraminal stenosis secondary to degenerative changes  Spinal cord is normal in signal  Visual his posterior fossa structures are unremarkable  Future Appointments    Date/Time Provider Specialty Site   05/09/2017 03:00 PM Garrison Mathew DO Pulmonary Medicine [facility] [place]   06/02/2017 09:30 AM ANDREA Becerra Pain Management [facility] SPINE   05/26/2017 01:30 PM MADAN Gaytan   Neurosurgery [facility] [address]   06/02/2017 11:00 AM Cassidy Guzman MD     Signatures Electronically signed by : MADAN Anne ; Apr 28 2017  2:50PM EST                       (Author)

## 2018-01-22 VITALS
TEMPERATURE: 97.9 F | HEIGHT: 70 IN | DIASTOLIC BLOOD PRESSURE: 68 MMHG | HEART RATE: 100 BPM | SYSTOLIC BLOOD PRESSURE: 138 MMHG | WEIGHT: 166.8 LBS | BODY MASS INDEX: 23.88 KG/M2

## 2018-01-22 VITALS
HEART RATE: 80 BPM | DIASTOLIC BLOOD PRESSURE: 70 MMHG | SYSTOLIC BLOOD PRESSURE: 138 MMHG | RESPIRATION RATE: 16 BRPM | WEIGHT: 162.5 LBS | TEMPERATURE: 97.7 F | BODY MASS INDEX: 23.26 KG/M2 | HEIGHT: 70 IN

## 2018-01-22 VITALS
RESPIRATION RATE: 16 BRPM | SYSTOLIC BLOOD PRESSURE: 124 MMHG | WEIGHT: 162 LBS | HEART RATE: 80 BPM | TEMPERATURE: 97.3 F | BODY MASS INDEX: 22.68 KG/M2 | DIASTOLIC BLOOD PRESSURE: 80 MMHG | HEIGHT: 71 IN

## 2018-01-23 VITALS
BODY MASS INDEX: 23.94 KG/M2 | TEMPERATURE: 96.4 F | HEIGHT: 71 IN | HEART RATE: 96 BPM | DIASTOLIC BLOOD PRESSURE: 94 MMHG | SYSTOLIC BLOOD PRESSURE: 150 MMHG | WEIGHT: 171 LBS | OXYGEN SATURATION: 96 %

## 2018-01-31 DIAGNOSIS — J44.9 CHRONIC OBSTRUCTIVE PULMONARY DISEASE, UNSPECIFIED COPD TYPE (HCC): Primary | ICD-10-CM

## 2018-01-31 DIAGNOSIS — F17.218 CIGARETTE NICOTINE DEPENDENCE WITH OTHER NICOTINE-INDUCED DISORDER: ICD-10-CM

## 2018-02-01 RX ORDER — UMECLIDINIUM 62.5 UG/1
AEROSOL, POWDER ORAL
Qty: 90 EACH | Refills: 0 | Status: SHIPPED | OUTPATIENT
Start: 2018-02-01 | End: 2018-05-01 | Stop reason: SDUPTHER

## 2018-02-01 RX ORDER — BUPROPION HYDROCHLORIDE 150 MG/1
TABLET, EXTENDED RELEASE ORAL
Qty: 90 TABLET | Refills: 0 | Status: SHIPPED | OUTPATIENT
Start: 2018-02-01 | End: 2018-02-09 | Stop reason: HOSPADM

## 2018-02-08 PROBLEM — F17.200 NICOTINE DEPENDENCE: Status: ACTIVE | Noted: 2017-03-10

## 2018-02-08 PROBLEM — J44.9 SEVERE CHRONIC OBSTRUCTIVE PULMONARY DISEASE (HCC): Status: ACTIVE | Noted: 2017-04-02

## 2018-02-08 PROBLEM — J30.2 SEASONAL ALLERGIES: Status: ACTIVE | Noted: 2017-04-28

## 2018-02-08 PROBLEM — R03.0 ELEVATED BLOOD PRESSURE READING: Status: ACTIVE | Noted: 2017-04-28

## 2018-02-08 PROBLEM — M47.26 OSTEOARTHRITIS OF SPINE WITH RADICULOPATHY, LUMBAR REGION: Status: ACTIVE | Noted: 2017-04-28

## 2018-02-08 RX ORDER — BUPROPION HYDROCHLORIDE 150 MG/1
1 TABLET, EXTENDED RELEASE ORAL
COMMUNITY
Start: 2017-04-28 | End: 2018-02-09 | Stop reason: HOSPADM

## 2018-02-08 RX ORDER — BUPROPION HYDROCHLORIDE 75 MG/1
1 TABLET ORAL DAILY
COMMUNITY
End: 2018-02-09 | Stop reason: HOSPADM

## 2018-02-08 RX ORDER — ALBUTEROL SULFATE 90 UG/1
2 AEROSOL, METERED RESPIRATORY (INHALATION)
COMMUNITY
Start: 2017-05-09 | End: 2020-10-13 | Stop reason: SDUPTHER

## 2018-02-08 RX ORDER — BUDESONIDE AND FORMOTEROL FUMARATE DIHYDRATE 160; 4.5 UG/1; UG/1
2 AEROSOL RESPIRATORY (INHALATION) 2 TIMES DAILY
COMMUNITY
Start: 2016-01-18 | End: 2018-02-13 | Stop reason: SDUPTHER

## 2018-02-08 RX ORDER — NAPROXEN SODIUM 220 MG
TABLET ORAL EVERY 8 HOURS
COMMUNITY
End: 2020-03-06 | Stop reason: ALTCHOICE

## 2018-02-08 RX ORDER — NICOTINE 21 MG/24HR
PATCH, TRANSDERMAL 24 HOURS TRANSDERMAL
COMMUNITY
Start: 2017-06-07 | End: 2020-03-06 | Stop reason: ALTCHOICE

## 2018-02-09 ENCOUNTER — OFFICE VISIT (OUTPATIENT)
Dept: FAMILY MEDICINE CLINIC | Facility: HOSPITAL | Age: 58
End: 2018-02-09
Payer: COMMERCIAL

## 2018-02-09 VITALS
TEMPERATURE: 97.1 F | HEART RATE: 88 BPM | DIASTOLIC BLOOD PRESSURE: 70 MMHG | HEIGHT: 71 IN | WEIGHT: 175.4 LBS | SYSTOLIC BLOOD PRESSURE: 136 MMHG | BODY MASS INDEX: 24.56 KG/M2

## 2018-02-09 DIAGNOSIS — J44.9 SEVERE CHRONIC OBSTRUCTIVE PULMONARY DISEASE (HCC): ICD-10-CM

## 2018-02-09 DIAGNOSIS — R03.0 ELEVATED BLOOD PRESSURE READING: Primary | ICD-10-CM

## 2018-02-09 DIAGNOSIS — F17.210 CIGARETTE NICOTINE DEPENDENCE WITHOUT COMPLICATION: Chronic | ICD-10-CM

## 2018-02-09 PROCEDURE — 99213 OFFICE O/P EST LOW 20 MIN: CPT | Performed by: FAMILY MEDICINE

## 2018-02-09 NOTE — PATIENT INSTRUCTIONS

## 2018-02-09 NOTE — PROGRESS NOTES
Assessment/Plan:       Diagnoses and all orders for this visit:    Elevated blood pressure reading  Comments:  BP's are stable without medication  Better off of Aleve    Cigarette nicotine dependence without complication  Comments:  Urged D/C of cigarettes  Severe chronic obstructive pulmonary disease (HCC)  Comments:  Stable on Symbicort  Vitals:    02/09/18 0802   BP: 136/70   Pulse: 88   Temp: (!) 97 1 °F (36 2 °C)       Subjective:      Patient ID: Roscoe Stout is a 59 y o  male  BP's sometimes up and down depending on his breathing  Better with Symbicort  Stopped Bupropion 45 days ago  Breathing is stable overall but realizes he must quit smoking  The following portions of the patient's history were reviewed and updated as appropriate: allergies, current medications, past family history, past medical history, past social history, past surgical history and problem list     Review of Systems   Constitutional: Negative for appetite change, chills, fatigue and fever  HENT: Negative for congestion, sinus pain and sinus pressure  Respiratory: Positive for cough, chest tightness, shortness of breath and wheezing  Cardiovascular: Negative for chest pain and palpitations  Gastrointestinal: Negative for abdominal pain  Musculoskeletal: Negative for gait problem  Skin: Negative for rash  Neurological: Negative for light-headedness and headaches  Psychiatric/Behavioral: The patient is not nervous/anxious  Objective:     Physical Exam   Constitutional: He appears well-developed and well-nourished  No distress  HENT:   Head: Normocephalic and atraumatic  Eyes: Conjunctivae are normal    Neck: No thyromegaly present  Cardiovascular: Normal rate, regular rhythm, normal heart sounds and intact distal pulses  Pulmonary/Chest: No respiratory distress  He has wheezes  He has no rales  Musculoskeletal: He exhibits no deformity     Lymphadenopathy:     He has no cervical adenopathy  Psychiatric: He has a normal mood and affect   His behavior is normal  Judgment and thought content normal

## 2018-02-12 RX ORDER — BUDESONIDE AND FORMOTEROL FUMARATE DIHYDRATE 160; 4.5 UG/1; UG/1
AEROSOL RESPIRATORY (INHALATION)
Qty: 30.6 G | Refills: 0 | OUTPATIENT
Start: 2018-02-12

## 2018-02-13 DIAGNOSIS — J44.9 CHRONIC OBSTRUCTIVE PULMONARY DISEASE, UNSPECIFIED COPD TYPE (HCC): Primary | ICD-10-CM

## 2018-02-13 RX ORDER — BUDESONIDE AND FORMOTEROL FUMARATE DIHYDRATE 160; 4.5 UG/1; UG/1
2 AEROSOL RESPIRATORY (INHALATION) 2 TIMES DAILY
Qty: 3 INHALER | Refills: 2 | Status: SHIPPED | OUTPATIENT
Start: 2018-02-13 | End: 2019-02-01 | Stop reason: SDUPTHER

## 2018-05-01 DIAGNOSIS — J44.9 CHRONIC OBSTRUCTIVE PULMONARY DISEASE, UNSPECIFIED COPD TYPE (HCC): Primary | ICD-10-CM

## 2018-05-01 DIAGNOSIS — F32.A DEPRESSION, UNSPECIFIED DEPRESSION TYPE: ICD-10-CM

## 2018-05-01 DIAGNOSIS — J44.9 CHRONIC OBSTRUCTIVE PULMONARY DISEASE, UNSPECIFIED COPD TYPE (HCC): ICD-10-CM

## 2018-05-01 RX ORDER — UMECLIDINIUM 62.5 UG/1
AEROSOL, POWDER ORAL
Qty: 90 EACH | Refills: 0 | OUTPATIENT
Start: 2018-05-01

## 2018-05-01 RX ORDER — BUPROPION HYDROCHLORIDE 150 MG/1
TABLET, EXTENDED RELEASE ORAL
Qty: 90 TABLET | Refills: 0 | OUTPATIENT
Start: 2018-05-01

## 2018-05-01 RX ORDER — BUPROPION HYDROCHLORIDE 150 MG/1
150 TABLET, EXTENDED RELEASE ORAL DAILY
Qty: 90 TABLET | Refills: 3 | Status: SHIPPED | OUTPATIENT
Start: 2018-05-01 | End: 2018-05-07 | Stop reason: SDUPTHER

## 2018-05-07 DIAGNOSIS — F32.A DEPRESSION, UNSPECIFIED DEPRESSION TYPE: ICD-10-CM

## 2018-05-07 RX ORDER — BUPROPION HYDROCHLORIDE 150 MG/1
150 TABLET, EXTENDED RELEASE ORAL DAILY
Qty: 90 TABLET | Refills: 1 | Status: SHIPPED | OUTPATIENT
Start: 2018-05-07 | End: 2019-09-06 | Stop reason: ALTCHOICE

## 2018-08-28 ENCOUNTER — TELEPHONE (OUTPATIENT)
Dept: FAMILY MEDICINE CLINIC | Facility: HOSPITAL | Age: 58
End: 2018-08-28

## 2018-08-28 DIAGNOSIS — M47.26 OSTEOARTHRITIS OF SPINE WITH RADICULOPATHY, LUMBAR REGION: Primary | ICD-10-CM

## 2018-08-28 RX ORDER — DICLOFENAC POTASSIUM 50 MG/1
50 TABLET, FILM COATED ORAL 3 TIMES DAILY
Qty: 30 TABLET | Refills: 0 | Status: SHIPPED | OUTPATIENT
Start: 2018-08-28 | End: 2019-09-06 | Stop reason: ALTCHOICE

## 2018-08-28 NOTE — TELEPHONE ENCOUNTER
Patient is having a problem with his sciatica  Wants to know if we can call an anti inflammatory in to [employer]  Please call him to let him know

## 2018-08-28 NOTE — TELEPHONE ENCOUNTER
Patient coming in to see you on Friday for f/u - he wants to know if we can call in something until then just to bring the pain down   Please advise, TY

## 2018-08-31 ENCOUNTER — OFFICE VISIT (OUTPATIENT)
Dept: FAMILY MEDICINE CLINIC | Facility: HOSPITAL | Age: 58
End: 2018-08-31
Payer: COMMERCIAL

## 2018-08-31 VITALS
TEMPERATURE: 96.8 F | BODY MASS INDEX: 24.65 KG/M2 | DIASTOLIC BLOOD PRESSURE: 82 MMHG | HEART RATE: 88 BPM | HEIGHT: 70 IN | SYSTOLIC BLOOD PRESSURE: 150 MMHG | WEIGHT: 172.2 LBS

## 2018-08-31 DIAGNOSIS — J44.9 SEVERE CHRONIC OBSTRUCTIVE PULMONARY DISEASE (HCC): ICD-10-CM

## 2018-08-31 DIAGNOSIS — F17.210 CIGARETTE NICOTINE DEPENDENCE WITHOUT COMPLICATION: ICD-10-CM

## 2018-08-31 DIAGNOSIS — R03.0 ELEVATED BLOOD PRESSURE READING: Primary | ICD-10-CM

## 2018-08-31 DIAGNOSIS — M48.02 CERVICAL STENOSIS OF SPINE: ICD-10-CM

## 2018-08-31 DIAGNOSIS — R79.89 ELEVATED SERUM CREATININE: ICD-10-CM

## 2018-08-31 DIAGNOSIS — M47.26 OSTEOARTHRITIS OF SPINE WITH RADICULOPATHY, LUMBAR REGION: ICD-10-CM

## 2018-08-31 PROCEDURE — 3008F BODY MASS INDEX DOCD: CPT | Performed by: FAMILY MEDICINE

## 2018-08-31 PROCEDURE — 99213 OFFICE O/P EST LOW 20 MIN: CPT | Performed by: FAMILY MEDICINE

## 2018-08-31 NOTE — PROGRESS NOTES
Assessment/Plan:         Diagnoses and all orders for this visit:    Elevated blood pressure reading  Comments:  OK to continue to observe off of medication      Severe chronic obstructive pulmonary disease (HCC)  Comments:  Stable with Incruse and ProAir    Cigarette nicotine dependence without complication  Comments:  Urged D/C    Cervical stenosis of spine  Comments:  Keep NSAIDs to a minimum    Osteoarthritis of spine with radiculopathy, lumbar region  Comments:  OK refer to [address]  Elevated serum creatinine  Comments:  Pt declined lab testing due to money outlay for orthopedic needs coming up  Subjective:      Patient ID: Renetta Grant is a 59 y o  male  Had flare of R sciatica and had to be off of work for several days  Went back to work two days ago  Had seen Dr Celso Barahona for neck and back and will be seeing Laura Burton 950 has been pretty stable  Still smoking and didn't do with the patches and switched to [employer] cigarettes  No longer has any coughing  Using ProAir 2-3 times a day and also the Incruse  The following portions of the patient's history were reviewed and updated as appropriate: allergies, current medications, past family history, past medical history, past social history, past surgical history and problem list     Review of Systems   Constitutional: Negative for unexpected weight change  HENT: Negative for congestion  Respiratory: Positive for shortness of breath and wheezing  Cardiovascular: Negative for chest pain, palpitations and leg swelling  Gastrointestinal: Negative for abdominal pain  Musculoskeletal: Positive for arthralgias, back pain, gait problem and neck pain  Neurological: Negative for headaches  Hematological: Does not bruise/bleed easily  All other systems reviewed and are negative          Objective:      /82   Pulse 88   Temp (!) 96 8 °F (36 °C)   Ht 5' 9 5" (1 765 m)   Wt 78 1 kg (172 lb 3 2 oz)   BMI 25 06 kg/m²          Physical Exam   Constitutional: He is oriented to person, place, and time  He appears well-developed and well-nourished  HENT:   Head: Normocephalic and atraumatic  Neck: No thyromegaly present  Cardiovascular: Normal rate, regular rhythm, normal heart sounds and intact distal pulses  No murmur heard  Pulmonary/Chest: Effort normal  No respiratory distress  He has wheezes  He has no rales  Musculoskeletal: He exhibits no edema  Neurological: He is oriented to person, place, and time  Psychiatric: He has a normal mood and affect  His behavior is normal  Judgment and thought content normal    Nursing note and vitals reviewed

## 2018-09-28 ENCOUNTER — CLINICAL SUPPORT (OUTPATIENT)
Dept: FAMILY MEDICINE CLINIC | Facility: HOSPITAL | Age: 58
End: 2018-09-28
Payer: COMMERCIAL

## 2018-09-28 DIAGNOSIS — Z23 NEED FOR INFLUENZA VACCINATION: Primary | ICD-10-CM

## 2018-09-28 PROCEDURE — 90471 IMMUNIZATION ADMIN: CPT

## 2018-09-28 PROCEDURE — 90682 RIV4 VACC RECOMBINANT DNA IM: CPT

## 2018-10-28 DIAGNOSIS — J44.9 CHRONIC OBSTRUCTIVE PULMONARY DISEASE, UNSPECIFIED COPD TYPE (HCC): ICD-10-CM

## 2018-10-28 RX ORDER — UMECLIDINIUM 62.5 UG/1
AEROSOL, POWDER ORAL
Qty: 90 EACH | Refills: 1 | Status: SHIPPED | OUTPATIENT
Start: 2018-10-28 | End: 2019-04-26 | Stop reason: SDUPTHER

## 2019-02-01 ENCOUNTER — TELEPHONE (OUTPATIENT)
Dept: FAMILY MEDICINE CLINIC | Facility: HOSPITAL | Age: 59
End: 2019-02-01

## 2019-02-01 DIAGNOSIS — J44.9 CHRONIC OBSTRUCTIVE PULMONARY DISEASE, UNSPECIFIED COPD TYPE (HCC): ICD-10-CM

## 2019-02-01 RX ORDER — BUDESONIDE AND FORMOTEROL FUMARATE DIHYDRATE 160; 4.5 UG/1; UG/1
2 AEROSOL RESPIRATORY (INHALATION) 2 TIMES DAILY
Qty: 3 INHALER | Refills: 2 | Status: SHIPPED | OUTPATIENT
Start: 2019-02-01 | End: 2019-02-01 | Stop reason: SDUPTHER

## 2019-02-01 RX ORDER — BUDESONIDE AND FORMOTEROL FUMARATE DIHYDRATE 160; 4.5 UG/1; UG/1
2 AEROSOL RESPIRATORY (INHALATION) 2 TIMES DAILY
Qty: 3 INHALER | Refills: 2 | Status: SHIPPED | OUTPATIENT
Start: 2019-02-01 | End: 2019-06-28 | Stop reason: SDUPTHER

## 2019-03-01 ENCOUNTER — OFFICE VISIT (OUTPATIENT)
Dept: FAMILY MEDICINE CLINIC | Facility: HOSPITAL | Age: 59
End: 2019-03-01
Payer: COMMERCIAL

## 2019-03-01 VITALS
HEIGHT: 70 IN | BODY MASS INDEX: 25.8 KG/M2 | HEART RATE: 93 BPM | WEIGHT: 180.2 LBS | SYSTOLIC BLOOD PRESSURE: 136 MMHG | DIASTOLIC BLOOD PRESSURE: 78 MMHG | TEMPERATURE: 97.5 F

## 2019-03-01 DIAGNOSIS — R03.0 ELEVATED BLOOD PRESSURE READING: ICD-10-CM

## 2019-03-01 DIAGNOSIS — J44.9 SEVERE CHRONIC OBSTRUCTIVE PULMONARY DISEASE (HCC): Primary | ICD-10-CM

## 2019-03-01 DIAGNOSIS — F17.210 CIGARETTE NICOTINE DEPENDENCE WITHOUT COMPLICATION: ICD-10-CM

## 2019-03-01 PROCEDURE — 99213 OFFICE O/P EST LOW 20 MIN: CPT | Performed by: FAMILY MEDICINE

## 2019-03-01 PROCEDURE — 3008F BODY MASS INDEX DOCD: CPT | Performed by: FAMILY MEDICINE

## 2019-03-01 NOTE — PROGRESS NOTES
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  The following portions of the patient's history were reviewed and updated as appropriate: allergies, current medications, past family history, past medical history, past social history, past surgical history and problem list     Review of Systems   Constitutional: Positive for unexpected weight change  Negative for fatigue  HENT: Negative  Respiratory: Positive for cough and shortness of breath  Cardiovascular: Negative  Gastrointestinal: Negative  Musculoskeletal: Positive for arthralgias, back pain and gait problem  Hematological: Negative  Psychiatric/Behavioral: Negative  All other systems reviewed and are negative  Objective:      /78   Pulse 93   Temp 97 5 °F (36 4 °C)   Ht 5' 9 5" (1 765 m)   Wt 81 7 kg (180 lb 3 2 oz)   BMI 26 23 kg/m²          Physical Exam   Constitutional: He is oriented to person, place, and time  He appears well-developed and well-nourished  HENT:   Head: Normocephalic and atraumatic  Pulmonary/Chest: No respiratory distress  He has decreased breath sounds  Musculoskeletal:        Lumbar back: He exhibits decreased range of motion and tenderness  Neurological: He is alert and oriented to person, place, and time  Skin: No rash noted  Psychiatric: He has a normal mood and affect   His behavior is normal  Judgment and thought content normal    Nursing note and vitals reviewed

## 2019-04-26 DIAGNOSIS — J44.9 CHRONIC OBSTRUCTIVE PULMONARY DISEASE, UNSPECIFIED COPD TYPE (HCC): ICD-10-CM

## 2019-04-26 RX ORDER — UMECLIDINIUM 62.5 UG/1
AEROSOL, POWDER ORAL
Qty: 90 EACH | Refills: 1 | Status: SHIPPED | OUTPATIENT
Start: 2019-04-26 | End: 2019-10-23 | Stop reason: SDUPTHER

## 2019-06-28 DIAGNOSIS — J44.9 CHRONIC OBSTRUCTIVE PULMONARY DISEASE, UNSPECIFIED COPD TYPE (HCC): ICD-10-CM

## 2019-06-29 RX ORDER — BUDESONIDE AND FORMOTEROL FUMARATE DIHYDRATE 160; 4.5 UG/1; UG/1
2 AEROSOL RESPIRATORY (INHALATION) 2 TIMES DAILY
Qty: 3 INHALER | Refills: 0 | Status: SHIPPED | OUTPATIENT
Start: 2019-06-29 | End: 2019-07-09 | Stop reason: SDUPTHER

## 2019-07-08 ENCOUNTER — TELEPHONE (OUTPATIENT)
Dept: FAMILY MEDICINE CLINIC | Facility: HOSPITAL | Age: 59
End: 2019-07-08

## 2019-07-08 DIAGNOSIS — J44.9 CHRONIC OBSTRUCTIVE PULMONARY DISEASE, UNSPECIFIED COPD TYPE (HCC): ICD-10-CM

## 2019-07-08 RX ORDER — BUDESONIDE AND FORMOTEROL FUMARATE DIHYDRATE 160; 4.5 UG/1; UG/1
2 AEROSOL RESPIRATORY (INHALATION) 2 TIMES DAILY
Qty: 3 INHALER | Refills: 0 | Status: CANCELLED | OUTPATIENT
Start: 2019-07-08

## 2019-07-09 DIAGNOSIS — J44.9 CHRONIC OBSTRUCTIVE PULMONARY DISEASE, UNSPECIFIED COPD TYPE (HCC): ICD-10-CM

## 2019-07-09 RX ORDER — BUDESONIDE AND FORMOTEROL FUMARATE DIHYDRATE 160; 4.5 UG/1; UG/1
2 AEROSOL RESPIRATORY (INHALATION) 2 TIMES DAILY
Qty: 3 INHALER | Refills: 1 | Status: SHIPPED | OUTPATIENT
Start: 2019-07-09 | End: 2020-02-26

## 2019-09-06 ENCOUNTER — OFFICE VISIT (OUTPATIENT)
Dept: FAMILY MEDICINE CLINIC | Facility: HOSPITAL | Age: 59
End: 2019-09-06
Payer: COMMERCIAL

## 2019-09-06 VITALS
HEART RATE: 108 BPM | WEIGHT: 180 LBS | HEIGHT: 70 IN | DIASTOLIC BLOOD PRESSURE: 82 MMHG | BODY MASS INDEX: 25.77 KG/M2 | TEMPERATURE: 96.8 F | SYSTOLIC BLOOD PRESSURE: 138 MMHG

## 2019-09-06 DIAGNOSIS — E66.3 OVERWEIGHT (BMI 25.0-29.9): ICD-10-CM

## 2019-09-06 DIAGNOSIS — F17.210 CIGARETTE NICOTINE DEPENDENCE WITHOUT COMPLICATION: ICD-10-CM

## 2019-09-06 DIAGNOSIS — G89.4 PAIN SYNDROME, CHRONIC: ICD-10-CM

## 2019-09-06 DIAGNOSIS — J44.9 SEVERE CHRONIC OBSTRUCTIVE PULMONARY DISEASE (HCC): Primary | ICD-10-CM

## 2019-09-06 PROCEDURE — 99213 OFFICE O/P EST LOW 20 MIN: CPT | Performed by: FAMILY MEDICINE

## 2019-09-06 NOTE — PROGRESS NOTES
Assessment/Plan:         Diagnoses and all orders for this visit:    Severe chronic obstructive pulmonary disease ([address] )  Comments:  Good stability with use of Symbicort inhaler  Cigarette nicotine dependence without complication  Comments:  Gave contact information for Roel Fung who does cessation hypnotherapy  Pain syndrome, chronic    Overweight (BMI 25 0-29  9)          Subjective:      Patient ID: Aries Grady is a 62 y o  male  6 month follow up  Had respiratory bug in the family 2 weeks ago  Took Mucinex with some benefit     Drinks 2 Red Bulls and a large [employer] daily  Able to sleep well  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    Using Symbicort and not needing to use the [facility] about hypnosis for cigarette cessation, currently a pack a day  The following portions of the patient's history were reviewed and updated as appropriate: allergies, current medications, past family history, past medical history, past social history, past surgical history and problem list     Review of Systems   Constitutional: Negative for unexpected weight change  All other systems reviewed and are negative  Objective:      /82   Pulse (!) 108   Temp (!) 96 8 °F (36 °C)   Ht 5' 9 5" (1 765 m)   Wt 81 6 kg (180 lb)   BMI 26 20 kg/m²          Physical Exam   Constitutional: He appears well-developed and well-nourished  HENT:   Head: Normocephalic and atraumatic  Neck: No thyromegaly present  Cardiovascular: Normal rate, regular rhythm and normal heart sounds  Pulmonary/Chest: Effort normal  He has decreased breath sounds  He has rhonchi  Musculoskeletal: He exhibits no edema  Psychiatric: He has a normal mood and affect  His behavior is normal    Nursing note and vitals reviewed  BMI Counseling: Body mass index is 26 2 kg/m²  Discussed the patient's BMI with him  The BMI is above average  BMI counseling and education was provided to the patient   Nutrition recommendations include reducing portion sizes and moderation in carbohydrate intake  Exercise recommendations include strength training exercises

## 2019-09-16 ENCOUNTER — TELEPHONE (OUTPATIENT)
Dept: FAMILY MEDICINE CLINIC | Facility: HOSPITAL | Age: 59
End: 2019-09-16

## 2019-09-16 NOTE — TELEPHONE ENCOUNTER
Colin Hayward made aware ED is best option  She will try to talk to [employer] to get him to go to the ED tonight

## 2019-09-16 NOTE — TELEPHONE ENCOUNTER
It sounds like the arterial circulation is blocked  I would prefer he goes to the ER to get the circulation checked but if he refuses, then the morning visit would be the next best option

## 2019-09-16 NOTE — TELEPHONE ENCOUNTER
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

## 2019-09-17 ENCOUNTER — HOSPITAL ENCOUNTER (OUTPATIENT)
Dept: NON INVASIVE DIAGNOSTICS | Facility: HOSPITAL | Age: 59
Discharge: HOME | End: 2019-09-17
Payer: COMMERCIAL

## 2019-09-17 ENCOUNTER — TELEPHONE (OUTPATIENT)
Dept: FAMILY MEDICINE CLINIC | Facility: HOSPITAL | Age: 59
End: 2019-09-17

## 2019-09-17 ENCOUNTER — OFFICE VISIT (OUTPATIENT)
Dept: FAMILY MEDICINE CLINIC | Facility: HOSPITAL | Age: 59
End: 2019-09-17
Payer: COMMERCIAL

## 2019-09-17 VITALS
OXYGEN SATURATION: 100 % | HEART RATE: 98 BPM | TEMPERATURE: 97.7 F | DIASTOLIC BLOOD PRESSURE: 90 MMHG | HEIGHT: 70 IN | WEIGHT: 180.8 LBS | BODY MASS INDEX: 25.88 KG/M2 | SYSTOLIC BLOOD PRESSURE: 144 MMHG

## 2019-09-17 DIAGNOSIS — M79.661 PAIN AND SWELLING OF RIGHT LOWER LEG: ICD-10-CM

## 2019-09-17 DIAGNOSIS — M47.26 OSTEOARTHRITIS OF SPINE WITH RADICULOPATHY, LUMBAR REGION: ICD-10-CM

## 2019-09-17 DIAGNOSIS — M79.89 PAIN AND SWELLING OF RIGHT LOWER LEG: Primary | ICD-10-CM

## 2019-09-17 DIAGNOSIS — M47.26 OSTEOARTHRITIS OF SPINE WITH RADICULOPATHY, LUMBAR REGION: Primary | ICD-10-CM

## 2019-09-17 DIAGNOSIS — M79.89 PAIN AND SWELLING OF RIGHT LOWER LEG: ICD-10-CM

## 2019-09-17 DIAGNOSIS — M79.661 PAIN AND SWELLING OF RIGHT LOWER LEG: Primary | ICD-10-CM

## 2019-09-17 PROCEDURE — 93971 EXTREMITY STUDY: CPT

## 2019-09-17 PROCEDURE — 93971 EXTREMITY STUDY: CPT | Performed by: SURGERY

## 2019-09-17 PROCEDURE — 99214 OFFICE O/P EST MOD 30 MIN: CPT | Performed by: NURSE PRACTITIONER

## 2019-09-17 RX ORDER — GABAPENTIN 300 MG/1
CAPSULE ORAL
Qty: 30 CAPSULE | Refills: 0 | Status: SHIPPED | OUTPATIENT
Start: 2019-09-17 | End: 2019-10-03 | Stop reason: SDUPTHER

## 2019-09-17 NOTE — TELEPHONE ENCOUNTER
----- Message from Joe sent at 9/17/2019 12:30 PM EDT -----  Please call pt and let him know her US was negative for blood clot  His pain is likely r/t his spine  I put in referral for pain management  Would he like to try the gabapentin we discussed? Another option would be a tapering steroid dose

## 2019-09-17 NOTE — PROGRESS NOTES
Assessment/Plan:      Diagnoses and all orders for this visit:    Pain and swelling of right lower leg  Comments:  Check stat venous doppler to r/o DVT  Orders:  -     VAS lower limb venous duplex study, unilateral/limited; Future    Osteoarthritis of spine with radiculopathy, lumbar region  Comments:  If doppler negative likely symptoms are from low spine  With known lumbar OA and radiculopathy  Refer to pain management  Discussed gabapentin  Pt unsure  ADD: Venous doppler negative  Pt willing to start gabapentin  Subjective:     Patient ID: Adriana Hutchins is a 62 y o  male  Right hip pain that radiates down entire leg  Leg is swollen  Has scoliosis  Yesterday pain worsened    Drove to [place] and had to keep stopping because of pain  Searing pain like pinching a nerve  Has chronic low back pain with radiculopathy  States odes get leg pain but not this bad and has never lasted this long  Leg swelling is new  Has seen pain management in the past and has had injections  Taking naproxen which is not helping  Has N/T which is ongoing  C/o sob and chest pain but states this is ongoing and not any worse than normal  COPD  Smoker  Review of Systems   Constitutional: Negative for chills, fever and unexpected weight change  Respiratory: Positive for shortness of breath  Cardiovascular: Positive for chest pain and leg swelling  Musculoskeletal: Positive for back pain and neck pain  Neurological: Positive for numbness  The following portions of the patient's history were reviewed and updated as appropriate: allergies, current medications, past family history, past medical history, past social history, past surgical history and problem list     Objective:  Vitals:    09/17/19 0906   BP: 144/90   Pulse: 98   Temp: 97 7 °F (36 5 °C)   SpO2: 100%      Physical Exam   Constitutional: He is oriented to person, place, and time   He appears well-developed and well-nourished  Cardiovascular: Normal rate, regular rhythm and normal heart sounds  No murmur heard  Pulses:       Dorsalis pedis pulses are 2+ on the right side, and 2+ on the left side  Posterior tibial pulses are 1+ on the right side, and 1+ on the left side  Right leg with brisk cap refill and normal temperature  Pulmonary/Chest: Effort normal and breath sounds normal    Musculoskeletal:   Right LE with mild nonpitting edema  No calf tenderness  Negative homen sign  Neurological: He is oriented to person, place, and time  Skin: Skin is warm and dry  Capillary refill takes less than 2 seconds  Psychiatric: He has a normal mood and affect

## 2019-09-17 NOTE — TELEPHONE ENCOUNTER
Script sent  He should start with 1 capsule at bedtime to see how it makes him feel  Can increase to twice daily after 3 days and if still not effective can increase to 3 times/day after another 3 days

## 2019-09-30 ENCOUNTER — TELEPHONE (OUTPATIENT)
Dept: FAMILY MEDICINE CLINIC | Facility: HOSPITAL | Age: 59
End: 2019-09-30

## 2019-09-30 NOTE — TELEPHONE ENCOUNTER
Pt left a message on our voicemail saying he is unable to walk/work and that we are aware of this and he is seeing the spine surgeon soon but needs a note or documentation from us that he is not able to work or walk

## 2019-10-02 ENCOUNTER — TELEPHONE (OUTPATIENT)
Dept: PAIN MEDICINE | Facility: CLINIC | Age: 59
End: 2019-10-02

## 2019-10-03 DIAGNOSIS — M47.26 OSTEOARTHRITIS OF SPINE WITH RADICULOPATHY, LUMBAR REGION: ICD-10-CM

## 2019-10-03 RX ORDER — GABAPENTIN 300 MG/1
CAPSULE ORAL
Qty: 30 CAPSULE | Refills: 0 | Status: SHIPPED | OUTPATIENT
Start: 2019-10-03 | End: 2019-10-14 | Stop reason: SDUPTHER

## 2019-10-05 ENCOUNTER — TELEPHONE (OUTPATIENT)
Dept: FAMILY MEDICINE CLINIC | Facility: HOSPITAL | Age: 59
End: 2019-10-05

## 2019-10-05 NOTE — TELEPHONE ENCOUNTER
Pt still in a lot of pain-right leg   They are following up with [facility] Monday but state they cant wait, they were given gabapentin by [facility] last and wanted to know if she could refill it to [facility] [address]  Pt wants a call back, let me know I can call when/if its ordered

## 2019-10-07 ENCOUNTER — OFFICE VISIT (OUTPATIENT)
Dept: FAMILY MEDICINE CLINIC | Facility: HOSPITAL | Age: 59
End: 2019-10-07
Payer: COMMERCIAL

## 2019-10-07 VITALS
SYSTOLIC BLOOD PRESSURE: 130 MMHG | BODY MASS INDEX: 26.05 KG/M2 | WEIGHT: 182 LBS | TEMPERATURE: 96.8 F | DIASTOLIC BLOOD PRESSURE: 88 MMHG | HEART RATE: 94 BPM | HEIGHT: 70 IN

## 2019-10-07 DIAGNOSIS — M47.27 LUMBOSACRAL RADICULOPATHY DUE TO DEGENERATIVE JOINT DISEASE OF SPINE: Primary | ICD-10-CM

## 2019-10-07 DIAGNOSIS — Z23 ENCOUNTER FOR IMMUNIZATION: ICD-10-CM

## 2019-10-07 DIAGNOSIS — M54.31 SCIATICA OF RIGHT SIDE: ICD-10-CM

## 2019-10-07 PROCEDURE — 90682 RIV4 VACC RECOMBINANT DNA IM: CPT | Performed by: FAMILY MEDICINE

## 2019-10-07 PROCEDURE — 90471 IMMUNIZATION ADMIN: CPT | Performed by: FAMILY MEDICINE

## 2019-10-07 PROCEDURE — 3008F BODY MASS INDEX DOCD: CPT | Performed by: FAMILY MEDICINE

## 2019-10-07 PROCEDURE — 99213 OFFICE O/P EST LOW 20 MIN: CPT | Performed by: FAMILY MEDICINE

## 2019-10-07 NOTE — PROGRESS NOTES
Assessment/Plan:         Diagnoses and all orders for this visit:    Lumbosacral radiculopathy due to degenerative joint disease of spine  -     MRI lumbar spine wo contrast; Future  -     Ambulatory referral to Physical Therapy; Future    Sciatica of right side  Comments:  Increase dose of Gabapentin from 900mg to 1200mg daily    Encounter for immunization  -     influenza vaccine, [id number], quadrivalent, recombinant, PF, 0 5 mL, for patients 18 yr+ (FLUBLOK)          Subjective:      Patient ID: Ayde Koenig is a 62 y o  male  Visit for R sided leg hip and back pain    He didn't want to pursue visit with Dr Richard Benavidez because treatments in the past didn't help  He has an appointment with [address] [place] [address]    Started about three weeks ago with pain from R low back and hip all the way to the foot  Gabapentin has been helpful  The following portions of the patient's history were reviewed and updated as appropriate: allergies, current medications, past family history, past medical history, past social history, past surgical history and problem list     Review of Systems   HENT: Negative  Respiratory: Negative  Genitourinary: Negative  Musculoskeletal: Positive for arthralgias, back pain, gait problem and joint swelling  All other systems reviewed and are negative  Objective:      /88   Pulse 94   Temp (!) 96 8 °F (36 °C)   Ht 5' 9 5" (1 765 m)   Wt 82 6 kg (182 lb)   BMI 26 49 kg/m²          Physical Exam   Constitutional: He is oriented to person, place, and time  Cardiovascular: Normal rate and regular rhythm  Musculoskeletal:        Arms:  Neurological: He is oriented to person, place, and time  Nursing note and vitals reviewed

## 2019-10-08 PROBLEM — Z23 ENCOUNTER FOR IMMUNIZATION: Status: ACTIVE | Noted: 2019-10-08

## 2019-10-11 ENCOUNTER — TELEPHONE (OUTPATIENT)
Dept: FAMILY MEDICINE CLINIC | Facility: HOSPITAL | Age: 59
End: 2019-10-11

## 2019-10-11 NOTE — TELEPHONE ENCOUNTER
Mri of lumbar spine [id number] requires prior auth  [phone] can be called to obtain   npi [id number] dx [id number]

## 2019-10-14 ENCOUNTER — TELEPHONE (OUTPATIENT)
Dept: FAMILY MEDICINE CLINIC | Facility: HOSPITAL | Age: 59
End: 2019-10-14

## 2019-10-14 DIAGNOSIS — M47.26 OSTEOARTHRITIS OF SPINE WITH RADICULOPATHY, LUMBAR REGION: ICD-10-CM

## 2019-10-14 RX ORDER — GABAPENTIN 300 MG/1
CAPSULE ORAL
Qty: 120 CAPSULE | Refills: 0 | Status: CANCELLED | OUTPATIENT
Start: 2019-10-14

## 2019-10-14 RX ORDER — GABAPENTIN 300 MG/1
300 CAPSULE ORAL 4 TIMES DAILY
Qty: 120 CAPSULE | Refills: 3 | Status: SHIPPED | OUTPATIENT
Start: 2019-10-14 | End: 2020-06-16

## 2019-10-14 NOTE — TELEPHONE ENCOUNTER
Based on last visit was told to take gabapentin 4 tablets daily     Needs refill - pls order for #120 to [facility] [address]

## 2019-10-17 ENCOUNTER — HOSPITAL ENCOUNTER (OUTPATIENT)
Dept: MRI IMAGING | Facility: HOSPITAL | Age: 59
Discharge: HOME | End: 2019-10-17
Payer: COMMERCIAL

## 2019-10-17 DIAGNOSIS — M47.27 LUMBOSACRAL RADICULOPATHY DUE TO DEGENERATIVE JOINT DISEASE OF SPINE: ICD-10-CM

## 2019-10-17 PROCEDURE — 72148 MRI LUMBAR SPINE W/O DYE: CPT

## 2019-10-23 ENCOUNTER — TELEPHONE (OUTPATIENT)
Dept: FAMILY MEDICINE CLINIC | Facility: HOSPITAL | Age: 59
End: 2019-10-23

## 2019-10-23 DIAGNOSIS — J44.9 CHRONIC OBSTRUCTIVE PULMONARY DISEASE, UNSPECIFIED COPD TYPE (HCC): ICD-10-CM

## 2019-10-23 RX ORDER — UMECLIDINIUM 62.5 UG/1
AEROSOL, POWDER ORAL
Qty: 90 EACH | Refills: 4 | Status: SHIPPED | OUTPATIENT
Start: 2019-10-23 | End: 2021-04-14 | Stop reason: SDUPTHER

## 2019-10-23 NOTE — TELEPHONE ENCOUNTER
Pt didn't schedule with pain management because pain management apt could be weeks away  they scheduled with [facility] spinal doc for a second opinion       About the forms he can actually  the forms the way that they are and leave the rest blank- as long as raymundo filled everything else out that he could, could we call him back again? hes hoping to pick them up tonight    I faxed last mri to [facility] fax [phone]    (phone [phone])

## 2019-10-23 NOTE — TELEPHONE ENCOUNTER
Got some more details from the wife:    He did find a pain specialist with [facility] - however, doesn't know the name of who he would be seeing and did not schedule an appointment yet  He said that he spoke with whoever is taking care of the disability and they said if you are able to fill out the form to the best you can, they can accept without him having the appointment scheduled yet - but knowing he is planning on it  Not sure how you feel regarding this  Luz Child i can call wife back if there is more info that you need

## 2019-10-23 NOTE — TELEPHONE ENCOUNTER
Pt is going to call and schedule today - I advised to have him call me back and let me know who he is seeing

## 2019-10-23 NOTE — TELEPHONE ENCOUNTER
I have the forms and they require if he is referred to another specialist   We advised pain management- does he have an appointment and who is he seeing?

## 2019-10-23 NOTE — TELEPHONE ENCOUNTER
Pt's wife called wanting to know the status of the disability forms she dropped off about 2 weeks ago? Do you have them on your desk?

## 2019-10-24 DIAGNOSIS — M51.27 HERNIATION OF INTERVERTEBRAL DISC BETWEEN L5 AND S1: Primary | ICD-10-CM

## 2019-10-24 DIAGNOSIS — M48.062 SPINAL STENOSIS OF LUMBAR REGION WITH NEUROGENIC CLAUDICATION: ICD-10-CM

## 2019-11-01 ENCOUNTER — TELEPHONE (OUTPATIENT)
Dept: FAMILY MEDICINE CLINIC | Facility: HOSPITAL | Age: 59
End: 2019-11-01

## 2019-11-01 ENCOUNTER — TELEPHONE (OUTPATIENT)
Dept: OTHER | Facility: OTHER | Age: 59
End: 2019-11-01

## 2019-11-13 ENCOUNTER — TELEPHONE (OUTPATIENT)
Dept: FAMILY MEDICINE CLINIC | Facility: HOSPITAL | Age: 59
End: 2019-11-13

## 2019-11-13 DIAGNOSIS — F17.210 CIGARETTE NICOTINE DEPENDENCE WITHOUT COMPLICATION: Primary | ICD-10-CM

## 2019-11-13 RX ORDER — VARENICLINE TARTRATE 25 MG
KIT ORAL
Qty: 53 TABLET | Refills: 0 | Status: SHIPPED | OUTPATIENT
Start: 2019-11-13 | End: 2019-12-30 | Stop reason: SDUPTHER

## 2019-11-13 NOTE — TELEPHONE ENCOUNTER
Patient requesting chantix so he can work on quitting smoking prior to his 12/11/19 surgery at [facility]    pls send to pharm on file    pls let patient know status of this request

## 2019-12-03 ENCOUNTER — EVALUATION (OUTPATIENT)
Dept: PHYSICAL THERAPY | Facility: CLINIC | Age: 59
End: 2019-12-03
Payer: COMMERCIAL

## 2019-12-03 DIAGNOSIS — M47.27 LUMBOSACRAL RADICULOPATHY DUE TO DEGENERATIVE JOINT DISEASE OF SPINE: ICD-10-CM

## 2019-12-03 PROCEDURE — 97110 THERAPEUTIC EXERCISES: CPT | Performed by: PHYSICAL THERAPIST

## 2019-12-03 PROCEDURE — 97162 PT EVAL MOD COMPLEX 30 MIN: CPT | Performed by: PHYSICAL THERAPIST

## 2019-12-03 NOTE — PROGRESS NOTES
PT Evaluation /Discharge Summary  **Addendum 2019: Pt is being discharged at this time as he has not returned to PT since his initial evaluation over two weeks ago, or been in contact to schedule any follow-ups  Today's date: 12/3/2019  Patient name: Kerrie Walden  : 1960  MRN: [id number]  Referring provider: Ozzy Bettencourt MD  Dx:   Encounter Diagnosis     ICD-10-CM    1  Lumbosacral radiculopathy due to degenerative joint disease of spine M47 27 Ambulatory referral to Physical Therapy                  Assessment  Assessment details: Pt is a 62year old male presenting to outpatient Physical Therapy with primary complaints of R sided lumbar radiculopathy, with an upcoming fusion surgery on 19  Pt presents with pain into all planes of motion of lumbar AROM, a R sided positive slump test, absent patellar reflexes, an antalgic gait pattern with a L sided trunk lean, and overall decreased functional mobility  These physical deficits are preventing the patient from participating in usual activities such as sleeping through the night and tolerating prolonged walking  Pt was provided with a core strengthening program as well as with sciatic nerve glides to prepare for surgery and for a best possible outcome  Per patient request, the plan is for him to do his HEP independently, then return to therapy following his operation  Thank you kindly for the referral!  Impairments: abnormal gait, abnormal or restricted ROM, activity intolerance, lacks appropriate home exercise program, pain with function and weight-bearing intolerance  Barriers to therapy: None  Understanding of Dx/Px/POC: fair   Prognosis: good    Goals  ST  The patient will verbalize understanding of HEP within first session-met  2  Pt will verbalize understanding of plan to return to PT following surgery by the end of his first session-met      *Long term goals will be re-established at his re-evaluation following his surgery    Plan  Plan details: Pt will return to PT for a re-evaluation following surgery   Patient would benefit from: skilled physical therapy  Planned modality interventions: thermotherapy: hydrocollator packs and cryotherapy  Planned therapy interventions: therapeutic activities, therapeutic exercise, home exercise program, manual therapy, joint mobilization, balance, patient education and neuromuscular re-education  Duration in weeks: 1  Plan of Care beginning date: 12/3/2019  Plan of Care expiration date: 2019  Treatment plan discussed with: patient        Subjective Evaluation    History of Present Illness  Mechanism of injury: Pt reports that he has pain from his right hip, all the way down his leg to the ankle  He is having a hard time walking and sleeping  His MD told him that he has a pinched nerve, and it is due to his spine  He has an upcoming fusion coming up on 19  He is unsure of why he is in therapy before his surgery and is not enthusiastic about it  Pain  Current pain ratin  At best pain ratin  At worst pain ratin  Quality: burning, squeezing and pulling  Aggravating factors: walking and standing  Progression: worsening    Social Support    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 )    Diagnostic Tests  Abnormal MRI: Broad-based left paramedian protrusion type disc herniation results in severe left lateral recess stenosis,  There is compression of the left S1 nerve root  Correlate for left S1 radiculopathy    Disc material contacts the thecal sac which is mildly   Treatments  Previous treatment: injection treatment  Current treatment: medication  Patient Goals  Patient goal: to prepare for his upcoming surgery next week         Objective     Neurological Testing     Sensation     Lumbar   Left   Intact: light touch    Right   Intact: light touch    Reflexes   Left   Patellar (L4): absent (0)    Right   Patellar (L4): absent (0)    Active Range of Motion     Lumbar   Flexion:  WFL and with pain  Extension:  WFL and with pain  Left lateral flexion:  WFL and with pain  Right lateral flexion:  WFL and with pain  Left rotation:  WFL and with pain  Right rotation:  WFL and with pain    Strength/Myotome Testing     Left Hip   Planes of Motion   Flexion: 5  Extension: 5  Abduction: 4+  Adduction: 4+    Right Hip   Planes of Motion   Flexion: 4+  Extension: 4+  Abduction: 4+  Adduction: 4+    Left Knee   Flexion: 4+  Extension: 5    Right Knee   Flexion: 4+  Extension: 5    Left Ankle/Foot   Dorsiflexion: 4+  Plantar flexion: 5  Inversion: 3+  Eversion: 4+    Right Ankle/Foot   Dorsiflexion: 4+  Plantar flexion: 4+  Inversion: 4+  Eversion: 4+    Tests     Lumbar     Left   Negative slump test      Right   Positive slump test      Ambulation     Comments   Pt ambulates with an antalgic gait pattern and a left laterally shifted trunk, with little trunk motion and a decreased speed               Precautions: COPD< arthritis  EPOC: 12/17/19      Manual                                                                                   Exercise Diary  12/3            HEP reviewed            Seated Dora             Standing abdominal bracing             Supine abdominal bracing             Shoulder rows with tb (seated)             Seated sciatic nerve glide                                                                                                                                                                                                        Modalities

## 2019-12-18 ENCOUNTER — TELEPHONE (OUTPATIENT)
Dept: FAMILY MEDICINE CLINIC | Facility: HOSPITAL | Age: 59
End: 2019-12-18

## 2019-12-18 DIAGNOSIS — K59.00 CONSTIPATION, UNSPECIFIED CONSTIPATION TYPE: Primary | ICD-10-CM

## 2019-12-18 NOTE — TELEPHONE ENCOUNTER
Per pharmacy, Gissell Landry is not on patient's formulary  Preferred medication is trulance  Do you want to send this instead?

## 2019-12-18 NOTE — TELEPHONE ENCOUNTER
Patient called in and said its been 7 days since last BM  S/P back surgery  Taking colace prescribed from the surgeon  He is not having any discomfort or abdominal pain at this point but would like to have something called into his pharmacy to get his bowels moving before he does get to this point

## 2019-12-30 ENCOUNTER — TELEPHONE (OUTPATIENT)
Dept: OTHER | Facility: OTHER | Age: 59
End: 2019-12-30

## 2019-12-30 DIAGNOSIS — F17.210 CIGARETTE NICOTINE DEPENDENCE WITHOUT COMPLICATION: ICD-10-CM

## 2019-12-30 RX ORDER — VARENICLINE TARTRATE 25 MG
KIT ORAL
Qty: 53 TABLET | Refills: 0 | Status: SHIPPED | OUTPATIENT
Start: 2019-12-30 | End: 2020-03-06 | Stop reason: ALTCHOICE

## 2020-01-07 ENCOUNTER — TELEPHONE (OUTPATIENT)
Dept: FAMILY MEDICINE CLINIC | Facility: HOSPITAL | Age: 60
End: 2020-01-07

## 2020-01-07 DIAGNOSIS — J01.01 ACUTE RECURRENT MAXILLARY SINUSITIS: Primary | ICD-10-CM

## 2020-01-07 RX ORDER — DOXYCYCLINE HYCLATE 100 MG/1
100 CAPSULE ORAL EVERY 12 HOURS SCHEDULED
Qty: 14 CAPSULE | Refills: 0 | Status: SHIPPED | OUTPATIENT
Start: 2020-01-07 | End: 2020-01-14

## 2020-01-07 NOTE — TELEPHONE ENCOUNTER
For 2 days, patient is complaining of sinus infection  Doesn't feel like he can make it in for an appointment, had back surgery on 12/11, and is having a hard time getting around  Wife is asking if there is anything he is able to take to help with this? Please advise

## 2020-01-09 ENCOUNTER — TELEPHONE (OUTPATIENT)
Dept: FAMILY MEDICINE CLINIC | Facility: HOSPITAL | Age: 60
End: 2020-01-09

## 2020-01-16 ENCOUNTER — EVALUATION (OUTPATIENT)
Dept: PHYSICAL THERAPY | Facility: CLINIC | Age: 60
End: 2020-01-16
Payer: COMMERCIAL

## 2020-01-16 ENCOUNTER — TRANSCRIBE ORDERS (OUTPATIENT)
Dept: PHYSICAL THERAPY | Facility: CLINIC | Age: 60
End: 2020-01-16

## 2020-01-16 DIAGNOSIS — Z98.1 S/P LUMBAR FUSION: Primary | ICD-10-CM

## 2020-01-16 DIAGNOSIS — M54.50 LUMBAR PAIN: ICD-10-CM

## 2020-01-16 PROCEDURE — 97162 PT EVAL MOD COMPLEX 30 MIN: CPT | Performed by: PHYSICAL THERAPIST

## 2020-01-16 NOTE — LETTER
February 10, 2020    Dino Oneal MD  [facility]  [place], [address] [facility] [address]    Patient: Kerrie Walden   YOB: 1960   Date of Visit: 2020     Encounter Diagnosis     ICD-10-CM    1  S/P lumbar fusion Z98 1    2  Lumbar pain M54 5        Dear Dr Minna Curiel: Thank you for your recent referral of Kerrie Walden  Please review the addendum on the attached evaluation summary from [address] [place] [address] recent visit  Please take note of the frequency change from 1-2x/week to 2-3x/week! Thank you  Please verify that you agree with the plan of care by signing the attached order  If you have any questions or concerns, please do not hesitate to call  I sincerely appreciate the opportunity to share in the care of one of your patients and hope to have another opportunity to work with you in the near future  Sincerely,    Osbaldo Choi, PT      Referring Provider:      I certify that I have read the below Plan of Care and certify the need for these services furnished under this plan of treatment while under my care  Dino Oneal MD  [facility], [facility]  VIA Facsimile: [phone]          PT Evaluation     Today's date: 2020  Patient name: Kerrie Walden  : 1960  MRN: [id number]  Referring provider: Nazario Winslow MD  Dx:   Encounter Diagnosis     ICD-10-CM    1  S/P lumbar fusion Z98 1    2  Lumbar pain M54 5                   Assessment  Assessment details: Kerrie Walden is a 62 y o  male [facility] an [facility] s/p lumbar fusion on 19  Pt presents w/ pain, hypertonicity of surrounding musculature, decreased LE flexibility, decreased strength, and impaired giat significantly limiting pt's functional ability    Pt will benefit from skilled PT services to address the above deficits in order to max function to allow pt to achieve goals in PT   Thank you for the referral of this pt  Impairments: abnormal gait, abnormal muscle tone, activity intolerance, impaired balance, impaired physical strength, lacks appropriate home exercise program and pain with function  Understanding of Dx/Px/POC: good   Prognosis: good    Goals  ST  Pain decreased by 25% in 4-6 weeks  2  Strength increased by 1/2 to 1 muscle grade in all deficient muscle groups in 4-6 weeks  LT  Decrease pain to 1-2/10 at worst by d/c   2  Increase ROM to [facility] for all deficient movements by d/c   3  Strength increased to 5 for all deficient muscle groups by d/c   4  IADL performance increased to max function by d/c   5  Recreational performance increased to max function by d/c   6  Ambulation/stair climbing improved to max level of function by d/c   7  Pt will RTW w/ 2-3/10 pain at worst by d/c  Plan  Planned modality interventions: cryotherapy and TENS  Other planned modality interventions: other modalities PRN  Planned therapy interventions: abdominal trunk stabilization, ADL retraining, IADL retraining, balance, flexibility, functional ROM exercises, gait training, graded exercise, home exercise program, manual therapy, neuromuscular re-education, patient education, postural training, strengthening, therapeutic exercise, therapeutic activities, transfer training and work reintegration  Other planned therapy interventions: other interventions PRN  Frequency: 2-3x/week  Duration in weeks: 6  Plan of Care beginning date: 2020  Plan of Care expiration date: 2020  Treatment plan discussed with: patient        Subjective Evaluation    History of Present Illness  Mechanism of injury: Pt reports to IE using SPC in RUE for ambulation s/p lumbar fusion done on 2019  Pt denies any known medical complications as a result of surgery  Prior to surgery, pt reports that he was unable to walk for 2 mos due to RLE "sciatic pain"    Since the surgery, pt reports that RLE pain has resolved and numbness is much less  He reports only intermittent and minimal numbness along ankles since surgery  Pt was instructed not to start PT for approx  1 month post-op  Pt reports that he was instructed not to lift post surgery and is unsure of continued lifting restrictions  Pt not drive long distances at this time  Discussed that driving while taking pain meds is still considered under the influence- pt reports that he is aware  Pt uses a shower chair and hand held shower head for bathing and is able to do independently  Pt also able to independently dress self    Pain  Current pain ratin  At worst pain rating: 10  Location: lumbosacral pain  Relieving factors: medications (Oxycodone, Gabapentin, Tylenol, Muscle relaxer)  Exacerbated by: sitting > 1 hour, walking/standing > few minutes, intermittently sleeping at night, stair climbing (STS fashion w/ rail and SPC)    Social Support  Steps to enter house: yes (1 short MORRO)  Stairs in house: yes (1 FF steps w/ rail and SPC)   Lives in: multiple-level home  Lives with: spouse    Employment status: not working (Pt has been out of work for approximately 4 months when sciatic pain began - prior to injury, was working as a )  Patient Goals  Patient goals for therapy: decreased pain, return to sport/leisure activities and return to work          Objective     Active Range of Motion     Additional Active Range of Motion Details  DNT due to neutral spine instructions as per surgeon    Strength/Myotome Testing     Left Hip   Planes of Motion   Flexion: 4+  Abduction: 4  External rotation: 4    Right Hip   Planes of Motion   Flexion: 4+  Abduction: 4  External rotation: 4    Left Knee   Flexion: 5  Extension: 5    Right Knee   Flexion: 4+  Extension: 5    Left Ankle/Foot   Dorsiflexion: 5  Plantar flexion: 5  Great toe extension: 5    Right Ankle/Foot   Dorsiflexion: 4+  Plantar flexion: 4+  Great toe extension: 5    Tests     Additional Tests Details  Gait: Pt ambulates w/ R Trendelenburg and increased b/l lateral trunk movement, increased R lateral trunk lean w/ R shoulder depressed    Observation/Posture: 19 cm incision along lumbar spine; two 1 cm incisions on either side of large incision  All incisions healing well w/o s/s of infection  Min erythema near incisions, but no drainage observed  DTR (R/L):   Patellar: 1+/1+  Achilles: 1+/1+    Palpation: Hypertonicity/tenderness w/ palpation to b/l QL, glute med, max; tenderness w/ palpation to incisions    Special Tests (R/L):   SLR: negative/negative  Crossed SLR: negative/negative    HS Length (assessed via popliteal angle method):  60 deg/58 deg         Daily Treatment Diary    EPO: 2/27/2019  Precautions: NEUTRAL SPINE EX ONLY; no lifting; COPD/asthma  Co- Morbidities:   Patient Active Problem List   Diagnosis    Cervical spondylolysis    Cervical stenosis of spine    Chronic bilateral low back pain    Chronic cervical radiculopathy    Elevated blood pressure reading    Nicotine dependence    Osteoarthritis of spine with radiculopathy, lumbar region    Pain syndrome, chronic    Seasonal allergies    Severe chronic obstructive pulmonary disease (HCC)    Elevated serum creatinine    Overweight (BMI 25 0-29  9)    Lumbosacral radiculopathy due to degenerative joint disease of spine    Sciatica of right side    Encounter for immunization    Herniation of intervertebral disc between L5 and S1    Spinal stenosis of lumbar region with neurogenic claudication       Manual  1/16                   TPR to b/l l/s psp, QL, glute med/max NV                                                                                      Total Time                        Exercise Diary  1/16                   HEP instruct/handout 5'           Recumbent Bike                     Hip ER stretch            Piriformis Stretch (neutral spine)            DLS: abdominal isometrics DLS: hip abduction                     DLS: hip adduction                     DLS: marches                     DLS:Bent knee fall outs                     Bridges (if able into small ROM)                     Clamshells                     DLS: SLR flexion, abduction                     Fwd WS onto step (glute engagement)                     TB resisted hip abd (when able)                      sit to stands                      sidestepping                      gait training                     Seated HS stretch - neutral spine                                                                                                               Modalities  1/16         CP PRN home

## 2020-01-16 NOTE — PROGRESS NOTES
PT Evaluation     Today's date: 2020  Patient name: Erick Esparza  : 1960  MRN: [id number]  Referring provider: Dylan Hadley MD  Dx:   Encounter Diagnosis     ICD-10-CM    1  S/P lumbar fusion Z98 1    2  Lumbar pain M54 5                   Assessment  Assessment details: Erick Esparza is a 62 y o  male presenting as an outpatient to [place] [address] PT s/p lumbar fusion on 19  Pt presents w/ pain, hypertonicity of surrounding musculature, decreased LE flexibility, decreased strength, and impaired giat significantly limiting pt's functional ability  Pt will benefit from skilled PT services to address the above deficits in order to max function to allow pt to achieve goals in PT  Thank you for the referral of this pt  Impairments: abnormal gait, abnormal muscle tone, activity intolerance, impaired balance, impaired physical strength, lacks appropriate home exercise program and pain with function  Understanding of Dx/Px/POC: good   Prognosis: good    Goals  ST  Pain decreased by 25% in 4-6 weeks  2  Strength increased by 1/2 to 1 muscle grade in all deficient muscle groups in 4-6 weeks  LT  Decrease pain to 1-2/10 at worst by d/c   2  Increase ROM to [facility] for all deficient movements by d/c   3  Strength increased to 5 for all deficient muscle groups by d/c   4  IADL performance increased to max function by d/c   5  Recreational performance increased to max function by d/c   6  Ambulation/stair climbing improved to max level of function by d/c   7  Pt will RTW w/ 2-3/10 pain at worst by d/c      Plan  Planned modality interventions: cryotherapy and TENS  Other planned modality interventions: other modalities PRN  Planned therapy interventions: abdominal trunk stabilization, ADL retraining, IADL retraining, balance, flexibility, functional ROM exercises, gait training, graded exercise, home exercise program, manual therapy, neuromuscular re-education, patient education, postural training, strengthening, therapeutic exercise, therapeutic activities, transfer training and work reintegration  Other planned therapy interventions: other interventions PRN  Frequency: 2-3x/week  Duration in weeks: 6  Plan of Care beginning date: 2020  Plan of Care expiration date: 2020  Treatment plan discussed with: patient        Subjective Evaluation    History of Present Illness  Mechanism of injury: Pt reports to IE using SPC in RUE for ambulation s/p lumbar fusion done on 2019  Pt denies any known medical complications as a result of surgery  Prior to surgery, pt reports that he was unable to walk for 2 mos due to RLE "sciatic pain"  Since the surgery, pt reports that RLE pain has resolved and numbness is much less  He reports only intermittent and minimal numbness along ankles since surgery  Pt was instructed not to start PT for approx  1 month post-op  Pt reports that he was instructed not to lift post surgery and is unsure of continued lifting restrictions  Pt not drive long distances at this time  Discussed that driving while taking pain meds is still considered under the influence- pt reports that he is aware  Pt uses a shower chair and hand held shower head for bathing and is able to do independently  Pt also able to independently dress self    Pain  Current pain ratin  At worst pain rating: 10  Location: lumbosacral pain  Relieving factors: medications (Oxycodone, Gabapentin, Tylenol, Muscle relaxer)  Exacerbated by: sitting > 1 hour, walking/standing > few minutes, intermittently sleeping at night, stair climbing (STS fashion w/ rail and SPC)    Social Support  Steps to enter house: yes (1 short MORRO)  Stairs in house: yes (1 FF steps w/ rail and SPC)   Lives in: multiple-level home  Lives with: spouse    Employment status: not working (Pt has been out of work for approximately 4 months when sciatic pain began - prior to injury, was working as a truck )  Patient Goals  Patient goals for therapy: decreased pain, return to sport/leisure activities and return to work          Objective     Active Range of Motion     Additional Active Range of Motion Details  DNT due to neutral spine instructions as per surgeon    Strength/Myotome Testing     Left Hip   Planes of Motion   Flexion: 4+  Abduction: 4  External rotation: 4    Right Hip   Planes of Motion   Flexion: 4+  Abduction: 4  External rotation: 4    Left Knee   Flexion: 5  Extension: 5    Right Knee   Flexion: 4+  Extension: 5    Left Ankle/Foot   Dorsiflexion: 5  Plantar flexion: 5  Great toe extension: 5    Right Ankle/Foot   Dorsiflexion: 4+  Plantar flexion: 4+  Great toe extension: 5    Tests     Additional Tests Details  Gait: Pt ambulates w/ R Trendelenburg and increased b/l lateral trunk movement, increased R lateral trunk lean w/ R shoulder depressed    Observation/Posture: 19 cm incision along lumbar spine; two 1 cm incisions on either side of large incision  All incisions healing well w/o s/s of infection  Min erythema near incisions, but no drainage observed      DTR (R/L):   Patellar: 1+/1+  Achilles: 1+/1+    Palpation: Hypertonicity/tenderness w/ palpation to b/l QL, glute med, max; tenderness w/ palpation to incisions    Special Tests (R/L):   SLR: negative/negative  Crossed SLR: negative/negative    HS Length (assessed via popliteal angle method):  60 deg/58 deg         Daily Treatment Diary    [facility]: 2/27/2019  Precautions: NEUTRAL SPINE EX ONLY; no lifting; COPD/asthma  Co- Morbidities:   Patient Active Problem List   Diagnosis    Cervical spondylolysis    Cervical stenosis of spine    Chronic bilateral low back pain    Chronic cervical radiculopathy    Elevated blood pressure reading    Nicotine dependence    Osteoarthritis of spine with radiculopathy, lumbar region    Pain syndrome, chronic    Seasonal allergies    Severe chronic obstructive pulmonary disease (HCC)    Elevated serum creatinine    Overweight (BMI 25 0-29  9)    Lumbosacral radiculopathy due to degenerative joint disease of spine    Sciatica of right side    Encounter for immunization    Herniation of intervertebral disc between L5 and S1    Spinal stenosis of lumbar region with neurogenic claudication       Manual  1/16                   TPR to b/l l/s psp, QL, glute med/max NV                                                                                      Total Time                        Exercise Diary  1/16                   HEP instruct/handout 5'           Recumbent Bike                     Hip ER stretch            Piriformis Stretch (neutral spine)            DLS: abdominal isometrics                     DLS: hip abduction                     DLS: hip adduction                     DLS: marches                     DLS:Bent knee fall outs                     Bridges (if able into small ROM)                     Clamshells                     DLS: SLR flexion, abduction                     Fwd WS onto step (glute engagement)                     TB resisted hip abd (when able)                      sit to stands                      sidestepping                      gait training                     Seated HS stretch - neutral spine                                                                                                               Modalities  1/16         CP PRN home

## 2020-01-16 NOTE — LETTER
2020    Marion Reis MD  [facility]  [facility] [address] [facility] [address]    Patient: Cristina Adrian   YOB: 1960   Date of Visit: 2020     Encounter Diagnosis     ICD-10-CM    1  S/P lumbar fusion Z98 1    2  Lumbar pain M54 5        Dear Dr Garcia Shown: Thank you for your recent referral of Cristina Adrian  Please review the attached evaluation summary from [address] [place] [address] recent visit  Please verify that you agree with the plan of care by signing the attached order  If you have any questions or concerns, please do not hesitate to call  I sincerely appreciate the opportunity to share in the care of one of your patients and hope to have another opportunity to work with you in the near future  Sincerely,    Osbaldo Choi, PT      Referring Provider:      I certify that I have read the below Plan of Care and certify the need for these services furnished under this plan of treatment while under my care  Marion Reis MD  [facility], [facility]  VIA Facsimile: [phone]          PT Evaluation     Today's date: 2020  Patient name: Cristina Adrian  : 1960  MRN: [id number]  Referring provider: Juliocesar Nicole MD  Dx:   Encounter Diagnosis     ICD-10-CM    1  S/P lumbar fusion Z98 1    2  Lumbar pain M54 5                   Assessment  Assessment details: Cristina Adrian is a 62 y o  male presenting as an outpatient to [facility] PT s/p lumbar fusion on 19  Pt presents w/ pain, hypertonicity of surrounding musculature, decreased LE flexibility, decreased strength, and impaired giat significantly limiting pt's functional ability  Pt will benefit from skilled PT services to address the above deficits in order to max function to allow pt to achieve goals in PT  Thank you for the referral of this pt       Impairments: abnormal gait, abnormal muscle tone, activity intolerance, impaired balance, impaired physical strength, lacks appropriate home exercise program and pain with function  Understanding of Dx/Px/POC: good   Prognosis: good    Goals  ST  Pain decreased by 25% in 4-6 weeks  2  Strength increased by 1/2 to 1 muscle grade in all deficient muscle groups in 4-6 weeks  LT  Decrease pain to 1-2/10 at worst by d/c   2  Increase ROM to [facility] for all deficient movements by d/c   3  Strength increased to 5 for all deficient muscle groups by d/c   4  IADL performance increased to max function by d/c   5  Recreational performance increased to max function by d/c   6  Ambulation/stair climbing improved to max level of function by d/c   7  Pt will RTW w/ 2-3/10 pain at worst by d/c  Plan  Planned modality interventions: cryotherapy and TENS  Other planned modality interventions: other modalities PRN  Planned therapy interventions: abdominal trunk stabilization, ADL retraining, IADL retraining, balance, flexibility, functional ROM exercises, gait training, graded exercise, home exercise program, manual therapy, neuromuscular re-education, patient education, postural training, strengthening, therapeutic exercise, therapeutic activities, transfer training and work reintegration  Other planned therapy interventions: other interventions PRN  Frequency: 1-2x/week  Duration in weeks: 6  Plan of Care beginning date: 2020  Plan of Care expiration date: 2020  Treatment plan discussed with: patient        Subjective Evaluation    History of Present Illness  Mechanism of injury: Pt reports to IE using SPC in RUE for ambulation s/p lumbar fusion done on 2019  Pt denies any known medical complications as a result of surgery  Prior to surgery, pt reports that he was unable to walk for 2 mos due to RLE "sciatic pain"  Since the surgery, pt reports that RLE pain has resolved and numbness is much less   He reports only intermittent and minimal numbness along ankles since surgery  Pt was instructed not to start PT for approx  1 month post-op  Pt reports that he was instructed not to lift post surgery and is unsure of continued lifting restrictions  Pt not drive long distances at this time  Discussed that driving while taking pain meds is still considered under the influence- pt reports that he is aware  Pt uses a shower chair and hand held shower head for bathing and is able to do independently  Pt also able to independently dress self    Pain  Current pain ratin  At worst pain rating: 10  Location: lumbosacral pain  Relieving factors: medications (Oxycodone, Gabapentin, Tylenol, Muscle relaxer)  Exacerbated by: sitting > 1 hour, walking/standing > few minutes, intermittently sleeping at night, stair climbing (STS fashion w/ rail and SPC)    Social Support  Steps to enter house: yes (1 short MORRO)  Stairs in house: yes (1 FF steps w/ rail and SPC)   Lives in: multiple-level home  Lives with: spouse    Employment status: not working (Pt has been out of work for approximately 4 months when sciatic pain began - prior to injury, was working as a )  Patient Goals  Patient goals for therapy: decreased pain, return to sport/leisure activities and return to work          Objective     Active Range of Motion     Additional Active Range of Motion Details  DNT due to neutral spine instructions as per surgeon    Strength/Myotome Testing     Left Hip   Planes of Motion   Flexion: 4+  Abduction: 4  External rotation: 4    Right Hip   Planes of Motion   Flexion: 4+  Abduction: 4  External rotation: 4    Left Knee   Flexion: 5  Extension: 5    Right Knee   Flexion: 4+  Extension: 5    Left Ankle/Foot   Dorsiflexion: 5  Plantar flexion: 5  Great toe extension: 5    Right Ankle/Foot   Dorsiflexion: 4+  Plantar flexion: 4+  Great toe extension: 5    Tests     Additional Tests Details  Gait: Pt ambulates w/ R Trendelenburg and increased b/l lateral trunk movement, increased R lateral trunk lean w/ R shoulder depressed    Observation/Posture: 19 cm incision along lumbar spine; two 1 cm incisions on either side of large incision  All incisions healing well w/o s/s of infection  Min erythema near incisions, but no drainage observed  DTR (R/L):   Patellar: 1+/1+  Achilles: 1+/1+    Palpation: Hypertonicity/tenderness w/ palpation to b/l QL, glute med, max; tenderness w/ palpation to incisions    Special Tests (R/L):   SLR: negative/negative  Crossed SLR: negative/negative    HS Length (assessed via popliteal angle method):  60 deg/58 deg         Daily Treatment Diary    [facility]: 2/27/2019  Precautions: NEUTRAL SPINE EX ONLY; no lifting; COPD/asthma  Co- Morbidities:   Patient Active Problem List   Diagnosis    Cervical spondylolysis    Cervical stenosis of spine    Chronic bilateral low back pain    Chronic cervical radiculopathy    Elevated blood pressure reading    Nicotine dependence    Osteoarthritis of spine with radiculopathy, lumbar region    Pain syndrome, chronic    Seasonal allergies    Severe chronic obstructive pulmonary disease (HCC)    Elevated serum creatinine    Overweight (BMI 25 0-29  9)    Lumbosacral radiculopathy due to degenerative joint disease of spine    Sciatica of right side    Encounter for immunization    Herniation of intervertebral disc between L5 and S1    Spinal stenosis of lumbar region with neurogenic claudication       Manual  1/16                   TPR to b/l l/s psp, QL, glute med/max NV                                                                                      Total Time                        Exercise Diary  1/16                   HEP instruct/handout 5'           Recumbent Bike                     Hip ER stretch            Piriformis Stretch (neutral spine)            DLS: abdominal isometrics                     DLS: hip abduction                     DLS: hip adduction                     DLS: william                     DLS:Bent knee fall outs                     Bridges (if able into small ROM)                     Cira                     DLS: SLR flexion, abduction                     Fwd WS onto step (glute engagement)                     TB resisted hip abd (when able)                      sit to stands                      sidestepping                      gait training                     Seated HS stretch - neutral spine                                                                                                               Modalities  1/16         CP PRN home

## 2020-01-20 ENCOUNTER — OFFICE VISIT (OUTPATIENT)
Dept: PHYSICAL THERAPY | Facility: CLINIC | Age: 60
End: 2020-01-20
Payer: COMMERCIAL

## 2020-01-20 DIAGNOSIS — Z98.1 S/P LUMBAR FUSION: Primary | ICD-10-CM

## 2020-01-20 DIAGNOSIS — M54.50 LUMBAR PAIN: ICD-10-CM

## 2020-01-20 PROCEDURE — 97112 NEUROMUSCULAR REEDUCATION: CPT

## 2020-01-20 PROCEDURE — 97140 MANUAL THERAPY 1/> REGIONS: CPT

## 2020-01-20 PROCEDURE — 97110 THERAPEUTIC EXERCISES: CPT

## 2020-01-20 NOTE — PROGRESS NOTES
Daily Note     Today's date: 2020  Patient name: Chris Mccarty  : 1960  MRN: [id number]  Referring provider: Sharlon Ganser, MD  Dx:   Encounter Diagnosis     ICD-10-CM    1  S/P lumbar fusion Z98 1                   Subjective: Patient states he is frustrated because he is limited with activity due to surgery  Reports he feels like a vegetable and wants to be better  Objective: See treatment diary below      Assessment: Tolerated treatment fair  Initiated POC to tolerance today  Patient demonstrated discomfort laying supine for prolonged periods of time but was able to complete all TE  Demonstrated muscle weakness into hip add with visible muscle shaking  Patient requested to stand from supine in between exercises due to discomfort  Good tolerance to TPR following TE  Good Patient demonstrated fatigue post treatment, exhibited good technique with therapeutic exercises and would benefit from continued PT      Plan: Continue per plan of care  Daily Treatment Diary    EPOC: 2019  Precautions: NEUTRAL SPINE EX ONLY; no lifting; COPD/asthma  Co- Morbidities:   Patient Active Problem List   Diagnosis    Cervical spondylolysis    Cervical stenosis of spine    Chronic bilateral low back pain    Chronic cervical radiculopathy    Elevated blood pressure reading    Nicotine dependence    Osteoarthritis of spine with radiculopathy, lumbar region    Pain syndrome, chronic    Seasonal allergies    Severe chronic obstructive pulmonary disease (HCC)    Elevated serum creatinine    Overweight (BMI 25 0-29  9)    Lumbosacral radiculopathy due to degenerative joint disease of spine    Sciatica of right side    Encounter for immunization    Herniation of intervertebral disc between L5 and S1    Spinal stenosis of lumbar region with neurogenic claudication       Manual                   TPR to b/l l/s psp, QL, glute med/max NV  RA Total Time    8                    Exercise Diary  1/16 1/20                 HEP instruct/handout 5'           Recumbent Bike    5'                 Hip ER stretch            Piriformis Stretch (neutral spine)  30'x3          DLS: abdominal isometrics    10"x`10                 DLS: hip abduction    10'x10                 DLS: hip adduction    10"x10                 DLS: marches    X39                 DLS:Bent knee fall outs    5"x10                 Bridges (if able into small ROM)    2"x10                 Clamshells                     DLS: SLR flexion, abduction                     Fwd WS onto step (glute engagement)                     TB resisted hip abd (when able)                      sit to stands                      sidestepping                      gait training                     Seated HS stretch - neutral spine                                                                                                               Modalities  1/16         CP PRN home

## 2020-01-23 ENCOUNTER — OFFICE VISIT (OUTPATIENT)
Dept: PHYSICAL THERAPY | Facility: CLINIC | Age: 60
End: 2020-01-23
Payer: COMMERCIAL

## 2020-01-23 DIAGNOSIS — M54.50 LUMBAR PAIN: ICD-10-CM

## 2020-01-23 DIAGNOSIS — Z98.1 S/P LUMBAR FUSION: Primary | ICD-10-CM

## 2020-01-23 PROCEDURE — 97110 THERAPEUTIC EXERCISES: CPT

## 2020-01-23 PROCEDURE — 97112 NEUROMUSCULAR REEDUCATION: CPT

## 2020-01-23 NOTE — PROGRESS NOTES
3Daily Note     Today's date: 2020  Patient name: Yanna Adames  : 1960  MRN: [id number]  Referring provider: Sydney Kapoor MD  Dx:   Encounter Diagnosis     ICD-10-CM    1  S/P lumbar fusion Z98 1    2  Lumbar pain M54 5                   Subjective: Patient states he felt good after last visit until 3am the next morning where he got out of bed and couldn't stand up straight  Objective: See treatment diary below      Assessment: Tolerated treatment fair  Patient continues to demonstrate muscle shaking with TE  Patient is very slow and diligent with exercises  Introduced clamshells this visit with good tolerance  Continue to add in exercises next visit  Patient demonstrated fatigue post treatment, exhibited good technique with therapeutic exercises and would benefit from continued PT      Plan: Continue per plan of care  Daily Treatment Diary    EPOC: 2019  Precautions: NEUTRAL SPINE EX ONLY; no lifting; COPD/asthma  Co- Morbidities:   Patient Active Problem List   Diagnosis    Cervical spondylolysis    Cervical stenosis of spine    Chronic bilateral low back pain    Chronic cervical radiculopathy    Elevated blood pressure reading    Nicotine dependence    Osteoarthritis of spine with radiculopathy, lumbar region    Pain syndrome, chronic    Seasonal allergies    Severe chronic obstructive pulmonary disease (HCC)    Elevated serum creatinine    Overweight (BMI 25 0-29  9)    Lumbosacral radiculopathy due to degenerative joint disease of spine    Sciatica of right side    Encounter for immunization    Herniation of intervertebral disc between L5 and S1    Spinal stenosis of lumbar region with neurogenic claudication         Manual                 TPR to b/l l/s psp, QL, glute med/max NV  RA  NV                                                                                  Total Time    8                    Exercise Diary   HEP instruct/handout 5'           Recumbent Bike    5'  5'               Hip ER stretch   30"x2         Piriformis Stretch (neutral spine)  30'x3 30"x3         DLS: abdominal isometrics    10"x`10  10"x10               DLS: hip abduction    10'x10  10"x10               DLS: hip adduction    10"x10  10"x10               DLS: marches    V19  x15               DLS:Bent knee fall outs    5"x10  5"x10               Bridges (if able into small ROM)    2"x10  2"x10               Clamshells      5"x10               DLS: SLR flexion, abduction                     Fwd WS onto step (glute engagement)                     TB resisted hip abd (when able)                      sit to stands                      sidestepping                      gait training                     Seated HS stretch - neutral spine                                                                                                               Modalities  1/16         CP PRN home

## 2020-01-28 ENCOUNTER — OFFICE VISIT (OUTPATIENT)
Dept: PHYSICAL THERAPY | Facility: CLINIC | Age: 60
End: 2020-01-28
Payer: COMMERCIAL

## 2020-01-28 DIAGNOSIS — M54.50 LUMBAR PAIN: ICD-10-CM

## 2020-01-28 DIAGNOSIS — Z98.1 S/P LUMBAR FUSION: Primary | ICD-10-CM

## 2020-01-28 PROCEDURE — 97112 NEUROMUSCULAR REEDUCATION: CPT | Performed by: PHYSICAL THERAPIST

## 2020-01-28 PROCEDURE — 97140 MANUAL THERAPY 1/> REGIONS: CPT | Performed by: PHYSICAL THERAPIST

## 2020-01-28 PROCEDURE — 97110 THERAPEUTIC EXERCISES: CPT | Performed by: PHYSICAL THERAPIST

## 2020-01-28 NOTE — PROGRESS NOTES
Daily Note     Today's date: 2020  Patient name: Katalina Haji  : 1960  MRN: [id number]  Referring provider: Sammy Martinez MD  Dx:   Encounter Diagnosis     ICD-10-CM    1  S/P lumbar fusion Z98 1    2  Lumbar pain M54 5                   Subjective: Patient reports that today is a painful day, but pain medication should start to improve symptoms soon  Pt reports that is only partially compliant w/ HEP states that he only does what he feels he can tolerate  Educated pt that these exercises are meant to ultimately strengthen core stabilizers and the goal is that these should improve pain in the long run  Objective: See treatment diary below      Assessment: Pt tolerated tx session fairly well w/o any increased pain in l/s  However, pt required frequent cueing and practice engaging TA properly  May consider adding s/l multifidus ex NV  Plan: Continue per plan of care  Daily Treatment Diary    EPOC: 2019  Precautions: NEUTRAL SPINE EX ONLY; no lifting; COPD/asthma  Co- Morbidities:   Patient Active Problem List   Diagnosis    Cervical spondylolysis    Cervical stenosis of spine    Chronic bilateral low back pain    Chronic cervical radiculopathy    Elevated blood pressure reading    Nicotine dependence    Osteoarthritis of spine with radiculopathy, lumbar region    Pain syndrome, chronic    Seasonal allergies    Severe chronic obstructive pulmonary disease (HCC)    Elevated serum creatinine    Overweight (BMI 25 0-29  9)    Lumbosacral radiculopathy due to degenerative joint disease of spine    Sciatica of right side    Encounter for immunization    Herniation of intervertebral disc between L5 and S1    Spinal stenosis of lumbar region with neurogenic claudication         Manual             TPR to b/l l/s psp, QL, glute med/max NV  RA  NV  RB                                                                        Total Time    8    12' Exercise Diary  1/16 1/20 1/23 1/28     HEP instruct/handout 5'       Recumbent Bike    5'  5'  5'     Hip ER stretch   30"x2 30"x3 ea    Piriformis Stretch (neutral spine)  30'x3 30"x3 NP- resume nv    DLS: abdominal isometrics    10"x`10  10"x10 10"x10 w/ self monitoring (no BP cuff); 10"x10 BP cuff (BP cuff only NV)     DLS: hip abduction    10'x10  10"x10  NP- resume NV     DLS: hip adduction    10"x10  10"x10  NP- resume nv     Seated glute set (on pball)    10"x10     DLS: marches    E91  x15  NP- pt not ready yet     DLS:Bent knee fall outs    5"x10  5"x10  10"x10     Bridges (if able into small ROM)    2"x10  2"x10  NP     Clamshells      5"x10  10"x10 w/ heel squeeze     S/l multifidus engagement    NV    DLS: SLR flexion, abduction             Fwd WS onto step (glute engagement)             TB resisted hip abd (when able)              sit to stands        NV w/ pelvic floor recruitment      sidestepping              gait training             Seated HS stretch - neutral spine                                                                      Modalities  1/16 1/28       CP PRN home  home

## 2020-01-31 ENCOUNTER — OFFICE VISIT (OUTPATIENT)
Dept: PHYSICAL THERAPY | Facility: CLINIC | Age: 60
End: 2020-01-31
Payer: COMMERCIAL

## 2020-01-31 DIAGNOSIS — Z98.1 S/P LUMBAR FUSION: Primary | ICD-10-CM

## 2020-01-31 DIAGNOSIS — M54.50 LUMBAR PAIN: ICD-10-CM

## 2020-01-31 PROCEDURE — 97112 NEUROMUSCULAR REEDUCATION: CPT

## 2020-01-31 PROCEDURE — 97110 THERAPEUTIC EXERCISES: CPT

## 2020-01-31 PROCEDURE — 97140 MANUAL THERAPY 1/> REGIONS: CPT

## 2020-01-31 NOTE — PROGRESS NOTES
Daily Note     Today's date: 2020  Patient name: Kasey Dejesus  : 1960  MRN: [id number]  Referring provider: Dharmesh Lozada MD  Dx:   Encounter Diagnosis     ICD-10-CM    1  S/P lumbar fusion Z98 1    2  Lumbar pain M54 5                   Subjective: Patient states she is feeling worse, reports he had to another Oxi and muscle relaxant just to get here today  Reports he believes the recumbent bike helps his sx's  Objective: See treatment diary below      Assessment: Patient arrived to clinic 8 min late, therefore unable to complete all exercises  Pt tolerated treatment session fair  Patient did not note of any increase in sx's but was frustrated through session due to pain and not being able to participate in normal life activities  He did feel mild relief from manuals  Plan: Continue per plan of care  Daily Treatment Diary    EPOC: 2019  Precautions: NEUTRAL SPINE EX ONLY; no lifting; COPD/asthma  Co- Morbidities:   Patient Active Problem List   Diagnosis    Cervical spondylolysis    Cervical stenosis of spine    Chronic bilateral low back pain    Chronic cervical radiculopathy    Elevated blood pressure reading    Nicotine dependence    Osteoarthritis of spine with radiculopathy, lumbar region    Pain syndrome, chronic    Seasonal allergies    Severe chronic obstructive pulmonary disease (HCC)    Elevated serum creatinine    Overweight (BMI 25 0-29  9)    Lumbosacral radiculopathy due to degenerative joint disease of spine    Sciatica of right side    Encounter for immunization    Herniation of intervertebral disc between L5 and S1    Spinal stenosis of lumbar region with neurogenic claudication         Manual           TPR to b/l l/s psp, QL, glute med/max NV  RA  NV  RB  RA                                                                      Total Time    8    12'  12'            Exercise Diary   HEP instruct/handout 5'       Recumbent Bike    5'  5'  5'  5'   Hip ER stretch   30"x2 30"x3 ea 30"x3 ea   Piriformis Stretch (neutral spine)  30'x3 30"x3 NP- resume nv 30"x3    DLS: abdominal isometrics    10"x`10  10"x10 10"x10 w/ self monitoring (no BP cuff); 10"x10 BP cuff (BP cuff only NV)  NP NV   DLS: hip abduction    10'x10  10"x10  NP- resume NV  10"x10   DLS: hip adduction    10"x10  10"x10  NP- resume nv 10"x10   Seated glute set (on pball)    10"x10  NV   DLS: marches    U83  x15  NP- pt not ready yet  NP   DLS:Bent knee fall outs    5"x10  5"x10  10"x10  NV   Bridges (if able into small ROM)    2"x10  2"x10  NP     Clamshells      5"x10  10"x10 w/ heel squeeze     S/l multifidus engagement    NV NV   DLS: SLR flexion, abduction             Fwd WS onto step (glute engagement)             TB resisted hip abd (when able)              sit to stands        NV w/ pelvic floor recruitment      sidestepping              gait training             Seated HS stretch - neutral spine                                                                      Modalities  1/16 1/28 1/31     CP PRN home  home  home

## 2020-02-03 ENCOUNTER — OFFICE VISIT (OUTPATIENT)
Dept: PHYSICAL THERAPY | Facility: CLINIC | Age: 60
End: 2020-02-03
Payer: COMMERCIAL

## 2020-02-03 DIAGNOSIS — Z98.1 S/P LUMBAR FUSION: Primary | ICD-10-CM

## 2020-02-03 DIAGNOSIS — M54.50 LUMBAR PAIN: ICD-10-CM

## 2020-02-03 PROCEDURE — 97110 THERAPEUTIC EXERCISES: CPT

## 2020-02-03 PROCEDURE — 97112 NEUROMUSCULAR REEDUCATION: CPT

## 2020-02-03 NOTE — PROGRESS NOTES
Daily Note     Today's date: 2/3/2020  Patient name: Lulu Lockett  : 1960  MRN: [id number]  Referring provider: Lelia Acosta MD  Dx: No diagnosis found  Subjective: Pt reports if he is sore he does not do a lot  Pt reports no radic pain today, feeling pretty good this AM     Objective: See treatment diary below      Assessment: Tolerated treatment fair  Patient works slow and guarded  Needs cont'd strengthening in the core  Pt unable to remember receiving manuals whether they helped or not  No manuals performed this visit  Plan: Continue per plan of care  Progress treatment as tolerated  Daily Treatment Diary    EPOC: 2019  Precautions: NEUTRAL SPINE EX ONLY; no lifting; COPD/asthma  Co- Morbidities:   Patient Active Problem List   Diagnosis    Cervical spondylolysis    Cervical stenosis of spine    Chronic bilateral low back pain    Chronic cervical radiculopathy    Elevated blood pressure reading    Nicotine dependence    Osteoarthritis of spine with radiculopathy, lumbar region    Pain syndrome, chronic    Seasonal allergies    Severe chronic obstructive pulmonary disease (HCC)    Elevated serum creatinine    Overweight (BMI 25 0-29  9)    Lumbosacral radiculopathy due to degenerative joint disease of spine    Sciatica of right side    Encounter for immunization    Herniation of intervertebral disc between L5 and S1    Spinal stenosis of lumbar region with neurogenic claudication         Manual    2/3       TPR to b/l l/s psp, QL, glute med/max NV  RA  NV  RB  RA  np                                                                    Total Time    8    12'  12'  -          Exercise Diary   2/3   HEP instruct/handout 5'        Recumbent Bike    5'  5'  5'  5' 5'   Hip ER stretch   30"x2 30"x3 ea 30"x3 ea 30"x3   Piriformis Stretch (neutral spine)  30'x3 30"x3 NP- resume nv 30"x3  30"x3 DLS: abdominal isometrics    10"x`10  10"x10 10"x10 w/ self monitoring (no BP cuff); 10"x10 BP cuff (BP cuff only NV)  NP NV 10"x10 BP cuff (BP cuff only NV)   DLS: hip abduction    10'x10  10"x10  NP- resume NV  10"x10 BLK [place] 10"x10   DLS: hip adduction    10"x10  10"x10  NP- resume nv 10"x10 BLK [place]   10"x10   Seated glute set (on pball)    10"x10  NV 10"x10  Bolster/ foot   DLS: marches    B47  x15  NP- pt not ready yet  NP NP   DLS:Bent knee fall outs    5"x10  5"x10  10"x10  NV 10"x10   Bridges (if able into small ROM)    2"x10  2"x10  NP      Clamshells      5"x10  10"x10 w/ heel squeeze      S/l multifidus engagement    NV NV NV   DLS: SLR flexion, abduction              Fwd WS onto step (glute engagement)              TB resisted hip abd (when able)               sit to stands        NV w/ pelvic floor recruitment       sidestepping               gait training              Seated HS stretch - neutral spine                                                                           Modalities  1/16 1/28 1/31  2/3    CP PRN home  home  home  home

## 2020-02-05 ENCOUNTER — OFFICE VISIT (OUTPATIENT)
Dept: PHYSICAL THERAPY | Facility: CLINIC | Age: 60
End: 2020-02-05
Payer: COMMERCIAL

## 2020-02-05 DIAGNOSIS — M54.50 LUMBAR PAIN: ICD-10-CM

## 2020-02-05 DIAGNOSIS — Z98.1 S/P LUMBAR FUSION: Primary | ICD-10-CM

## 2020-02-05 PROCEDURE — 97112 NEUROMUSCULAR REEDUCATION: CPT

## 2020-02-05 PROCEDURE — 97140 MANUAL THERAPY 1/> REGIONS: CPT

## 2020-02-05 PROCEDURE — 97110 THERAPEUTIC EXERCISES: CPT

## 2020-02-05 NOTE — PROGRESS NOTES
Daily Note     Today's date: 2020  Patient name: Jesus Mahan  : 1960  MRN: [id number]  Referring provider: Lori Dobson MD  Dx:   Encounter Diagnosis     ICD-10-CM    1  S/P lumbar fusion Z98 1    2  Lumbar pain M54 5                   Subjective: Pt reports doing well post LV into yesterday  States woke at 4 am w/ LBP  Presents w/ antalgic gait w/ SPC  Objective: See treatment diary below      Assessment: Tolerated treatment fair w/ TA stabilizer  Patient frustrated w/ stabilizer and shaking in LE"s w/ DLS ex's  Moved ex's to standing in // bars and had pt work on posture  Pt reports feeling mm's working more w/ new ex's  Needs cont'd LE and postural strengthening  Plan: Continue per plan of care  Progress treatment as tolerated  update HEP NV  Daily Treatment Diary    EPOC: 2019  Precautions: NEUTRAL SPINE EX ONLY; no lifting; COPD/asthma  Co- Morbidities:   Patient Active Problem List   Diagnosis    Cervical spondylolysis    Cervical stenosis of spine    Chronic bilateral low back pain    Chronic cervical radiculopathy    Elevated blood pressure reading    Nicotine dependence    Osteoarthritis of spine with radiculopathy, lumbar region    Pain syndrome, chronic    Seasonal allergies    Severe chronic obstructive pulmonary disease (HCC)    Elevated serum creatinine    Overweight (BMI 25 0-29  9)    Lumbosacral radiculopathy due to degenerative joint disease of spine    Sciatica of right side    Encounter for immunization    Herniation of intervertebral disc between L5 and S1    Spinal stenosis of lumbar region with neurogenic claudication         Manual    2/3  2/5     TPR to b/l l/s psp, QL, glute med/max NV  RA  NV  RB  RA  np  JK  SL                                                                 Total Time    8    12'  12'  -  10'        Exercise Diary   2/3 2/5   HEP instruct/handout 5' Recumbent Bike    5'  5'  5'  5' 5' 5'   Hip ER stretch   30"x2 30"x3 ea 30"x3 ea 30"x3 np   Piriformis Stretch (neutral spine)  30'x3 30"x3 NP- resume nv 30"x3  30"x3 np   DLS: abdominal isometrics    10"x`10  10"x10 10"x10 w/ self monitoring (no BP cuff); 10"x10 BP cuff (BP cuff only NV)  NP NV 10"x10 BP cuff (BP cuff only NV) 10"x10 BP cuff (BP cuff only NV)   DLS: hip abduction    10'x10  10"x10  NP- resume NV  10"x10 BLK [place] 10"x10 BLK [place] 10"x10   DLS: hip adduction    10"x10  10"x10  NP- resume nv 10"x10 BLK SD   10"x10 BLK SD   10"x10   Seated glute set (on pball)    10"x10  NV 10"x10  Bolster/ foot 10"x10  Bolster/ foot   DLS: marches    L13  x15  NP- pt not ready yet  NP NP    DLS:Bent knee fall outs    5"x10  5"x10  10"x10  NV 10"x10 10"x10   Bridges (if able into small ROM)    2"x10  2"x10  NP       Clamshells      5"x10  10"x10 w/ heel squeeze    NV   S/l multifidus engagement    NV NV NV Std leaning onplinth 10x bl   DLS: SLR flexion, abduction               Fwd WS onto step (glute engagement)               TB resisted hip abd (when able)            No band flex,ext,abd 10x eabl    sit to stands        NV w/ pelvic floor recruitment        sidestepping            3 laps    gait training               Seated HS stretch - neutral spine            nv                                                                    Modalities  1/16 1/28 1/31  2/3    CP PRN home  home  home  home

## 2020-02-07 ENCOUNTER — OFFICE VISIT (OUTPATIENT)
Dept: PHYSICAL THERAPY | Facility: CLINIC | Age: 60
End: 2020-02-07
Payer: COMMERCIAL

## 2020-02-07 DIAGNOSIS — Z98.1 S/P LUMBAR FUSION: Primary | ICD-10-CM

## 2020-02-07 DIAGNOSIS — M54.50 LUMBAR PAIN: ICD-10-CM

## 2020-02-07 PROCEDURE — 97110 THERAPEUTIC EXERCISES: CPT | Performed by: PHYSICAL THERAPIST

## 2020-02-07 NOTE — PROGRESS NOTES
Daily Note     Today's date: 2020  Patient name: Buck Espinoza  : 1960  MRN: [id number]  Referring provider: Jeremy Alvarado MD  Dx:   Encounter Diagnosis     ICD-10-CM    1  S/P lumbar fusion Z98 1    2  Lumbar pain M54 5            *Pt arrived at 11:19       Subjective: Pt reports that he is in a lot of pain today, he says that the weather does not help  At the end of today's session he reports that he feels much better than when he came in       Objective: See treatment diary below      Assessment: Overall the patient tolerated today's treatment well  He displays moderate weakness of core musculature with shaking indicating fatigue, especially with hip ABD/ADD and knee fall outs  He showed good glute med activation today with S/L clamshells  He would benefit from continued skilled PT to further his core strength and activity tolerance  Plan: Continue per plan of care  Progress treatment as tolerated  Daily Treatment Diary    EPOC: 2019  Precautions: NEUTRAL SPINE EX ONLY; no lifting; COPD/asthma  Co- Morbidities:   Patient Active Problem List   Diagnosis    Cervical spondylolysis    Cervical stenosis of spine    Chronic bilateral low back pain    Chronic cervical radiculopathy    Elevated blood pressure reading    Nicotine dependence    Osteoarthritis of spine with radiculopathy, lumbar region    Pain syndrome, chronic    Seasonal allergies    Severe chronic obstructive pulmonary disease (HCC)    Elevated serum creatinine    Overweight (BMI 25 0-29  9)    Lumbosacral radiculopathy due to degenerative joint disease of spine    Sciatica of right side    Encounter for immunization    Herniation of intervertebral disc between L5 and S1    Spinal stenosis of lumbar region with neurogenic claudication         Manual    2/3  2/     TPR to b/l l/s psp, QL, glute med/max NV  RA  NV  RB  RA  np  JK  SL Total Time    8    12'  12'  -  10'        Exercise Diary  1/16 1/20 1/23 1/28 1/31 2/3 2/5 2/7   HEP instruct/handout 5'          Recumbent Bike    5'  5'  5'  5' 5' 5' 5'   Hip ER stretch   30"x2 30"x3 ea 30"x3 ea 30"x3 np    Piriformis Stretch (neutral spine)  30'x3 30"x3 NP- resume nv 30"x3  30"x3 np    DLS: abdominal isometrics    10"x`10  10"x10 10"x10 w/ self monitoring (no BP cuff); 10"x10 BP cuff (BP cuff only NV)  NP NV 10"x10 BP cuff (BP cuff only NV) 10"x10 BP cuff (BP cuff only NV) 10 x10"   DLS: hip abduction    10'x10  10"x10  NP- resume NV  10"x10 BLK [place] 10"x10 BLK [place] 10"x10 BLK [place] 10"x10   DLS: hip adduction    10"x10  10"x10  NP- resume nv 10"x10 BLK [place]   10"x10 BLK [place]   10"x10 BLK [place]   10"x10   Seated glute set (on pball)    10"x10  NV 10"x10  Bolster/ foot 10"x10  Bolster/ foot    DLS: marches    D76  x15  NP- pt not ready yet  NP NP     DLS:Bent knee fall outs    5"x10  5"x10  10"x10  NV 10"x10 10"x10 10"x10   Bridges (if able into small ROM)    2"x10  2"x10  NP        Clamshells      5"x10  10"x10 w/ heel squeeze    NV S/L  2 x10 ea   S/l multifidus engagement    NV NV NV Std leaning onplinth 10x bl Std leaning onplinth 10x bl   DLS: SLR flexion, abduction                Fwd WS onto step (glute engagement)                TB resisted hip abd (when able)            No band flex,ext,abd 10x eabl     sit to stands        NV w/ pelvic floor recruitment         sidestepping            3 laps     gait training                Seated HS stretch - neutral spine            nv 3 x30" ea                                                                        Modalities  1/16 1/28 1/31  2/3    CP PRN home  home  home  home

## 2020-02-10 ENCOUNTER — TRANSCRIBE ORDERS (OUTPATIENT)
Dept: PHYSICAL THERAPY | Facility: CLINIC | Age: 60
End: 2020-02-10

## 2020-02-10 ENCOUNTER — OFFICE VISIT (OUTPATIENT)
Dept: PHYSICAL THERAPY | Facility: CLINIC | Age: 60
End: 2020-02-10
Payer: COMMERCIAL

## 2020-02-10 DIAGNOSIS — Z98.1 S/P LUMBAR FUSION: Primary | ICD-10-CM

## 2020-02-10 DIAGNOSIS — M54.50 LUMBAR PAIN: ICD-10-CM

## 2020-02-10 PROCEDURE — 97014 ELECTRIC STIMULATION THERAPY: CPT | Performed by: PHYSICAL THERAPIST

## 2020-02-10 PROCEDURE — G0283 ELEC STIM OTHER THAN WOUND: HCPCS | Performed by: PHYSICAL THERAPIST

## 2020-02-10 PROCEDURE — 97010 HOT OR COLD PACKS THERAPY: CPT | Performed by: PHYSICAL THERAPIST

## 2020-02-10 PROCEDURE — 97140 MANUAL THERAPY 1/> REGIONS: CPT | Performed by: PHYSICAL THERAPIST

## 2020-02-10 PROCEDURE — 97112 NEUROMUSCULAR REEDUCATION: CPT | Performed by: PHYSICAL THERAPIST

## 2020-02-10 NOTE — PROGRESS NOTES
Daily Note     Today's date: 2/10/2020  Patient name: Marty Zhang  : 1960  MRN: [id number]  Referring provider: Mariel Joyner MD  Dx:   Encounter Diagnosis     ICD-10-CM    1  S/P lumbar fusion Z98 1    2  Lumbar pain M54 5                  Subjective: Pt w/o any new complaints to report upon arrival to tx session  However, he is concerned that he still needs pain meds even though he has slowly decreased frequency of taking pain meds  Discussed that we could attempt TENS unit this visit to determine if improved pain reduction afterwards  He states that he typically feels better leaving tx sessions and he has been more complaint w/ HEP  Objective: See treatment diary below      Assessment: Tx session tolerated fairly well this visit  Pt reports that he felt significant decrease in pain level post manuals; however pain returned towards end of tx session as pt completed exercises  Concluded w/ TENS  + CP w/ very good tolerance and improved pain level afterwards  Plan: Continue per plan of care  Progress treatment as tolerated  Daily Treatment Diary    EPOC: 2019  Precautions: NEUTRAL SPINE EX ONLY; no lifting; COPD/asthma  Co- Morbidities:   Patient Active Problem List   Diagnosis    Cervical spondylolysis    Cervical stenosis of spine    Chronic bilateral low back pain    Chronic cervical radiculopathy    Elevated blood pressure reading    Nicotine dependence    Osteoarthritis of spine with radiculopathy, lumbar region    Pain syndrome, chronic    Seasonal allergies    Severe chronic obstructive pulmonary disease (HCC)    Elevated serum creatinine    Overweight (BMI 25 0-29  9)    Lumbosacral radiculopathy due to degenerative joint disease of spine    Sciatica of right side    Encounter for immunization    Herniation of intervertebral disc between L5 and S1    Spinal stenosis of lumbar region with neurogenic claudication         Manual  1/28  1/31  2/3  2/5 2/10     TPR to b/l l/s psp, QL, glute med/max  RB  RA  np  JK  SL  RB s/l                                                     Total Time  15'  12'  -  10'  12'        Exercise Diary 2/5 2/7 2/10     HEP instruct/handout        Recumbent Bike 5' 5' 5'     Hip ER stretch np HEP      Piriformis Stretch (neutral spine) np HEP      DLS: abdominal isometrics 10"x10 BP cuff (BP cuff only NV) 10 x10" 10"x10     DLS: hip abduction BLK NC 10"x10 BLK NC 10"x10 BLK NC 10"x10     DLS: hip adduction BLK NC   10"x10 BLK NC   10"x10 BLK NC 10"x10     Seated glute set (on pball) 10"x10  Bolster/ foot  10"x10 feet on bolster     DLS: marches   Attempt again NV     DLS:Bent knee fall outs 10"x10 10"x10 10"x10 ea     Bridges (if able into small ROM)        Clamshells NV S/L  2 x10 ea S/l 2x10 ea     S/l multifidus engagement Std leaning onplinth 10x bl Std leaning onplinth 10x bl Std leaning on plinth 15x ea     DLS: SLR flexion, abduction        Fwd WS onto step (glute engagement)        TB resisted hip abd (when able) No band flex,ext,abd 10x eabl  10x ea b/l       sit to stands   NV!       sidestepping 3 laps  np- resume nv      gait training        Seated HS stretch - neutral spine nv 3 x30" ea np- resume nv                                          Modalities  1/16  1/28  1/31  2/3  2/10    CP PRN home  home  home  home 10' h/l w/ TENS     TENS         10' w/ CP

## 2020-02-12 ENCOUNTER — OFFICE VISIT (OUTPATIENT)
Dept: PHYSICAL THERAPY | Facility: CLINIC | Age: 60
End: 2020-02-12
Payer: COMMERCIAL

## 2020-02-12 DIAGNOSIS — Z98.1 S/P LUMBAR FUSION: ICD-10-CM

## 2020-02-12 DIAGNOSIS — M54.50 LUMBAR PAIN: Primary | ICD-10-CM

## 2020-02-12 PROCEDURE — 97110 THERAPEUTIC EXERCISES: CPT

## 2020-02-12 PROCEDURE — G0283 ELEC STIM OTHER THAN WOUND: HCPCS

## 2020-02-12 PROCEDURE — 97112 NEUROMUSCULAR REEDUCATION: CPT

## 2020-02-12 PROCEDURE — 97014 ELECTRIC STIMULATION THERAPY: CPT

## 2020-02-12 NOTE — PROGRESS NOTES
Daily Note     Today's date: 2020  Patient name: Blake Mathew  : 1960  MRN: [id number]  Referring provider: Gladis Alexis MD  Dx:   Encounter Diagnosis     ICD-10-CM    1  Lumbar pain M54 5    2  S/P lumbar fusion Z98 1                   Subjective: Pt reports he had some relief during use of TENS but does not think it increased his residual relief  Objective: See treatment diary below      Assessment: Tolerated treatment fair  Patient c/o SOB post bike at 5'  Pt demonstrated compliance w/ HEP  Pt given updated HEP  Pt slow to progress but willing to do what is asked of him  Plan: Continue per plan of care  Progress treatment as tolerated  Daily Treatment Diary    EPOC: 2019  Precautions: NEUTRAL SPINE EX ONLY; no lifting; COPD/asthma  Co- Morbidities:   Patient Active Problem List   Diagnosis    Cervical spondylolysis    Cervical stenosis of spine    Chronic bilateral low back pain    Chronic cervical radiculopathy    Elevated blood pressure reading    Nicotine dependence    Osteoarthritis of spine with radiculopathy, lumbar region    Pain syndrome, chronic    Seasonal allergies    Severe chronic obstructive pulmonary disease (HCC)    Elevated serum creatinine    Overweight (BMI 25 0-29  9)    Lumbosacral radiculopathy due to degenerative joint disease of spine    Sciatica of right side    Encounter for immunization    Herniation of intervertebral disc between L5 and S1    Spinal stenosis of lumbar region with neurogenic claudication         Manual  1/28  1/31  2/3  2/5 2/10 2/12    TPR to b/l l/s psp, QL, glute med/max  RB  RA  np  JK  SL  RB s/l np                                                    Total Time  15'  12'  -  10'  12' -       Exercise Diary 2/5 2/7 2/10 2/12    HEP instruct/handout        Recumbent Bike 5' 5' 5' 5'    Hip ER stretch np HEP      Piriformis Stretch (neutral spine) np HEP      DLS: abdominal isometrics 10"x10 BP cuff (BP cuff only NV) 10 x10" 10"x10 T/o session    DLS: hip abduction BLK SD 10"x10 BLK SD 10"x10 BLK SD 10"x10 BLK SD 10"x10    DLS: hip adduction BLK SD   10"x10 BLK SD   10"x10 BLK SD 10"x10 Ball  10"x10    Seated glute set (on pball) 10"x10  Bolster/ foot  10"x10 feet on bolster NP/ resum NV    DLS: marches   Attempt again NV 15x alt    DLS:Bent knee fall outs 10"x10 10"x10 10"x10 ea 10"x10 ea     Bridges (if able into small ROM)        Clamshells NV S/L  2 x10 ea S/l 2x10 ea S/l 2x10 ea    S/l multifidus engagement Std leaning onplinth 10x bl Std leaning onplinth 10x bl Std leaning on plinth 15x ea Std leaning on plinth 15x ea    DLS: SLR flexion, abduction    10x SLR    Fwd WS onto step (glute engagement)        TB resisted hip abd (when able) No band flex,ext,abd 10x eabl  10x ea b/l  10x abd     sit to stands   NV! NV!     sidestepping 3 laps  np- resume nv 4 laps     gait training        Seated HS stretch - neutral spine nv 3 x30" ea np- resume nv 3 x30" ea                                         Modalities  1/16 1/28 1/31  2/3  2/10 2/10   CP PRN home  home  home  home 10' h/l w/ TENS 10' h/l w/ TENS    TENS         10' w/ CP 10' w/ CP

## 2020-02-14 ENCOUNTER — OFFICE VISIT (OUTPATIENT)
Dept: PHYSICAL THERAPY | Facility: CLINIC | Age: 60
End: 2020-02-14
Payer: COMMERCIAL

## 2020-02-14 DIAGNOSIS — M54.50 LUMBAR PAIN: Primary | ICD-10-CM

## 2020-02-14 DIAGNOSIS — Z98.1 S/P LUMBAR FUSION: ICD-10-CM

## 2020-02-14 PROCEDURE — G0283 ELEC STIM OTHER THAN WOUND: HCPCS | Performed by: PHYSICAL THERAPIST

## 2020-02-14 PROCEDURE — 97010 HOT OR COLD PACKS THERAPY: CPT | Performed by: PHYSICAL THERAPIST

## 2020-02-14 PROCEDURE — 97014 ELECTRIC STIMULATION THERAPY: CPT | Performed by: PHYSICAL THERAPIST

## 2020-02-14 PROCEDURE — 97140 MANUAL THERAPY 1/> REGIONS: CPT | Performed by: PHYSICAL THERAPIST

## 2020-02-14 PROCEDURE — 97112 NEUROMUSCULAR REEDUCATION: CPT | Performed by: PHYSICAL THERAPIST

## 2020-02-14 NOTE — PROGRESS NOTES
Daily Note     Today's date: 2020  Patient name: Marrion Crigler  : 1960  MRN: [id number]  Referring provider: Alexandre Bolanos MD  Dx:   Encounter Diagnosis     ICD-10-CM    1  Lumbar pain M54 5    2  S/P lumbar fusion Z98 1                   Subjective: Pt continues to report that his back "locks up" intermittently w/ activity  Objective: See treatment diary below      Assessment: Despite pt reporting that his back continues to "lock up", he demonstrates improved fluidity w/ gait and sit to stand transfers vs  At IE  He adds that he has now been able to tolerate approx  20 minutes of ambulation, but afterwards needs to lie down  Discussed breaking up ambulation into shorter distances w/ sitting breaks if seat available  Added sit <-> stands w/ glute engagement this visit which pt was challenged by, but able to perform w/ improved technique w/ practice  Instructed pt to practice at home  Plan: Continue per plan of care  Progress treatment as tolerated  Daily Treatment Diary    EPOC: 2019  Precautions: NEUTRAL SPINE EX ONLY; no lifting; COPD/asthma  Co- Morbidities:   Patient Active Problem List   Diagnosis    Cervical spondylolysis    Cervical stenosis of spine    Chronic bilateral low back pain    Chronic cervical radiculopathy    Elevated blood pressure reading    Nicotine dependence    Osteoarthritis of spine with radiculopathy, lumbar region    Pain syndrome, chronic    Seasonal allergies    Severe chronic obstructive pulmonary disease (HCC)    Elevated serum creatinine    Overweight (BMI 25 0-29  9)    Lumbosacral radiculopathy due to degenerative joint disease of spine    Sciatica of right side    Encounter for immunization    Herniation of intervertebral disc between L5 and S1    Spinal stenosis of lumbar region with neurogenic claudication         Manual 2/10 2/12 2/14     TPR to b/l l/s psp, QL, glute med/max  RB s/l np RB Total Time  12' - 14'        Exercise Diary 2/10 2/12 2/14     HEP instruct/handout        Recumbent Bike 5' 5' 5'     Hip ER stretch   HEP     Piriformis Stretch (neutral spine)   HEP     DLS: abdominal isometrics 10"x10 T/o session T/o session     DLS: hip abduction BLK NM 10"x10 BLK NM 10"x10 BLK NM 10"x10     DLS: hip adduction BLK NM 10"x10 Ball  10"x10 BLK NM 10"x10     Seated glute set (on pball) 10"x10 feet on bolster NP/ resum NV NP     DLS: marches Attempt again NV 15x alt 15x alt     DLS:Bent knee fall outs 10"x10 ea 10"x10 ea  NP     Bridges (if able into small ROM)   NV focus on glutes     Clamshells S/l 2x10 ea S/l 2x10 ea S/l- 10"x (R) 10"x9 (L)- to fatigue     S/l multifidus engagement Std leaning on plinth 15x ea Std leaning on plinth 15x ea Std leaning on plinth 15x ea     DLS: SLR flexion, abduction  10x SLR 10x SLR     Fwd WS onto step (glute engagement)        TB resisted hip abd  10x ea b/l  10x abd NP- resume nv      sit to stands NV! NV!  5x glute engagement      sidestepping np- resume nv 4 laps HEP      gait training        Seated HS stretch - neutral spine np- resume nv 3 x30" ea 3x30" ea                                          Modalities 2/10 2/12 2/14     CP PRN 10' h/l w/ TENS 10' h/l w/ TENS 10' h/l w/ TENS      TENS 10' w/ CP 10' w/ CP 10' w/ CP

## 2020-02-17 ENCOUNTER — APPOINTMENT (OUTPATIENT)
Dept: PHYSICAL THERAPY | Facility: CLINIC | Age: 60
End: 2020-02-17
Payer: COMMERCIAL

## 2020-02-19 ENCOUNTER — OFFICE VISIT (OUTPATIENT)
Dept: PHYSICAL THERAPY | Facility: CLINIC | Age: 60
End: 2020-02-19
Payer: COMMERCIAL

## 2020-02-19 DIAGNOSIS — Z98.1 S/P LUMBAR FUSION: Primary | ICD-10-CM

## 2020-02-19 PROCEDURE — 97010 HOT OR COLD PACKS THERAPY: CPT | Performed by: PHYSICAL THERAPIST

## 2020-02-19 PROCEDURE — 97112 NEUROMUSCULAR REEDUCATION: CPT | Performed by: PHYSICAL THERAPIST

## 2020-02-19 PROCEDURE — 97014 ELECTRIC STIMULATION THERAPY: CPT | Performed by: PHYSICAL THERAPIST

## 2020-02-19 PROCEDURE — 97110 THERAPEUTIC EXERCISES: CPT | Performed by: PHYSICAL THERAPIST

## 2020-02-19 PROCEDURE — G0283 ELEC STIM OTHER THAN WOUND: HCPCS | Performed by: PHYSICAL THERAPIST

## 2020-02-19 PROCEDURE — 97140 MANUAL THERAPY 1/> REGIONS: CPT | Performed by: PHYSICAL THERAPIST

## 2020-02-19 NOTE — PROGRESS NOTES
Daily Note     Today's date: 2020  Patient name: Kasey Dejesus  : 1960  MRN: [id number]  Referring provider: Dharmesh Lozada MD  Dx:   Encounter Diagnosis     ICD-10-CM    1  S/P lumbar fusion Z98 1                   Subjective: Pt reports feeling much better today vs  Monday when he could hardly get out of bed due to pain  He reports that pain lasted until this AM prior to getting OOB  Pt did not even bring SPC into tx session today  He reports that he is frustrated that his spine and possibly a shifted "cage" in spine may be the reason he is ok at times and then steps/moves "the wrong way" and experiences severe pain/locking up  Discussed w/ pt that muscle spasms could cause locking up, but that he should discuss his concerns w/ surgeon at f/u next week  Objective: See treatment diary below    Assessment: Progressed exercises this visit and added step ups on 4" step w/ glute engagement  Pt reports pain towards end of activity, but finished 15 reps each  Improved endurance w/ clamshells this visit  Pt seems to be ambulating w/ improved core control and less lat trunk lean w/ gait vs  At IE  Plan: Continue per plan of care  Progress treatment as tolerated  Daily Treatment Diary    EPOC: 2019  Precautions: NEUTRAL SPINE EX ONLY; no lifting; COPD/asthma  Co- Morbidities:   Patient Active Problem List   Diagnosis    Cervical spondylolysis    Cervical stenosis of spine    Chronic bilateral low back pain    Chronic cervical radiculopathy    Elevated blood pressure reading    Nicotine dependence    Osteoarthritis of spine with radiculopathy, lumbar region    Pain syndrome, chronic    Seasonal allergies    Severe chronic obstructive pulmonary disease (HCC)    Elevated serum creatinine    Overweight (BMI 25 0-29  9)    Lumbosacral radiculopathy due to degenerative joint disease of spine    Sciatica of right side    Encounter for immunization    Herniation of intervertebral disc between L5 and S1    Spinal stenosis of lumbar region with neurogenic claudication         Manual 2/10 2/12 2/14 2/19    TPR to b/l l/s psp, QL, glute med/max  RB s/l np RB RB                                   Total Time  12' - 14' 14'       Exercise Diary 2/10 2/12 2/14 2/19    HEP instruct/handout        Recumbent Bike 5' 5' 5' 5'    Hip ER stretch   HEP     Piriformis Stretch (neutral spine)   HEP     DLS: abdominal isometrics 10"x10 T/o session T/o session T/o session    DLS: hip abduction BLK ID 10"x10 BLK ID 10"x10 BLK ID 10"x10 BLK ID 10"x10    DLS: hip adduction BLK ID 10"x10 Ball  10"x10 BLK ID 10"x10 BLK ID 10"x10    Seated glute set (on pball) 10"x10 feet on bolster NP/ resum NV NP NP    DLS: marches Attempt again NV 15x alt 15x alt NP- resume nv     DLS:Bent knee fall outs 10"x10 ea 10"x10 ea  NP NP    Bridges (if able into small ROM)   NV focus on glutes NV w/ glute focus? Clamshells S/l 2x10 ea S/l 2x10 ea S/l- 10"x 10 (R) 10"x9 (L)- to fatigue S/l- 10"x10 ea    S/l multifidus engagement Std leaning on plinth 15x ea Std leaning on plinth 15x ea Std leaning on plinth 15x ea NP- resume nv    DLS: SLR flexion, abduction  10x SLR 10x SLR NP- resume nv    Step ups- fwd    4" 15x ea w/ u/l UE support    TB resisted hip abd  10x ea b/l  10x abd NP- resume nv 15x abd/flex     sit to stands NV! NV!  5x glute engagement 5x glute engagement     sidestepping np- resume nv 4 laps HEP 2 laps    Seated HS stretch - neutral spine np- resume nv 3 x30" ea 3x30" ea 30"x3 ea (avoid pain in l/s)    Seated n  glides    10x ea                                 Modalities 2/10 2/12 2/14 2/19    CP PRN 10' h/l w/ TENS 10' h/l w/ TENS 10' h/l w/ TENS 10' h/l w/ TENS     TENS 10' w/ CP 10' w/ CP 10' w/ CP 10' w/ CP

## 2020-02-24 ENCOUNTER — TRANSCRIBE ORDERS (OUTPATIENT)
Dept: PHYSICAL THERAPY | Facility: CLINIC | Age: 60
End: 2020-02-24

## 2020-02-24 ENCOUNTER — EVALUATION (OUTPATIENT)
Dept: PHYSICAL THERAPY | Facility: CLINIC | Age: 60
End: 2020-02-24
Payer: COMMERCIAL

## 2020-02-24 DIAGNOSIS — Z98.1 S/P LUMBAR FUSION: Primary | ICD-10-CM

## 2020-02-24 DIAGNOSIS — M54.50 LUMBAR PAIN: ICD-10-CM

## 2020-02-24 PROCEDURE — 97014 ELECTRIC STIMULATION THERAPY: CPT | Performed by: PHYSICAL THERAPIST

## 2020-02-24 PROCEDURE — 97010 HOT OR COLD PACKS THERAPY: CPT | Performed by: PHYSICAL THERAPIST

## 2020-02-24 PROCEDURE — G0283 ELEC STIM OTHER THAN WOUND: HCPCS | Performed by: PHYSICAL THERAPIST

## 2020-02-24 PROCEDURE — 97112 NEUROMUSCULAR REEDUCATION: CPT | Performed by: PHYSICAL THERAPIST

## 2020-02-24 PROCEDURE — 97110 THERAPEUTIC EXERCISES: CPT | Performed by: PHYSICAL THERAPIST

## 2020-02-24 PROCEDURE — 97140 MANUAL THERAPY 1/> REGIONS: CPT | Performed by: PHYSICAL THERAPIST

## 2020-02-24 NOTE — PROGRESS NOTES
PT Re-Evaluation     Today's date: 2020  Patient name: Jennie Agrawal  : 1960  MRN: [id number]  Referring provider: Miguel Mckeon MD  Dx:   Encounter Diagnosis     ICD-10-CM    1  S/P lumbar fusion Z98 1    2  Lumbar pain M54 5                   Assessment  Assessment details: Jennie Agrawal is a 62 y o  male being treated as an outpatient at [place] [address] PT s/p lumbar fusion on 19  Since IE, pt has made gains in pain reduction, activity tolerance/function, ambulatory function, and strength, but continues to remain limited from max function  Pt will continue to benefit from skilled PT services in order to max function to allow pt to achieve goals in PT  Thank you  Impairments: abnormal gait, abnormal muscle tone, activity intolerance, impaired balance, impaired physical strength and pain with function  Understanding of Dx/Px/POC: good   Prognosis: good    Goals  ST  Pain decreased by 25% in 4-6 weeks  - Partially Met  2  Strength increased by 1/2 to 1 muscle grade in all deficient muscle groups in 4-6 weeks  - Partially Met    LT  Decrease pain to 1-2/10 at worst by d/c - Not Met  2  Increase ROM to [facility] for all deficient movements by d/c - DNT as per restrictions  3  Strength increased to 5 for all deficient muscle groups by d/c - Partially Met  4  IADL performance increased to max function by d/c - Partially Met  5  Recreational performance increased to max function by d/c - Not Met  6  Ambulation/stair climbing improved to max level of function by d/c - Partially Met  7   Pt will RTW w/ 2-3/10 pain at worst by d/c - Not Met    Plan  Planned modality interventions: cryotherapy and TENS  Other planned modality interventions: other modalities PRN  Planned therapy interventions: abdominal trunk stabilization, ADL retraining, IADL retraining, balance, flexibility, functional ROM exercises, gait training, graded exercise, home exercise program, manual therapy, neuromuscular re-education, patient education, postural training, strengthening, therapeutic exercise, therapeutic activities, transfer training and work reintegration  Other planned therapy interventions: other interventions PRN  Frequency: 2-3x/week  Duration in weeks: 4  Plan of Care beginning date: 2/24/2020  Plan of Care expiration date: 3/23/2020  Treatment plan discussed with: patient        Subjective Evaluation    History of Present Illness  Mechanism of injury: CURRENT LEVEL (2/24/2020):  Pt has made gains in overall activity tolerance/function since IE, but expresses frustrated that he continues to experience "stiffness" and intermittent "locking up" in his back  He cannot attribute any specific activity to onset of "locking up" aside from "stepping wrong" or "moving wrong"  Pt now able to tolerate approximately 20 minutes of ambulation prior to needing rest break due to pain  In addition, there are times pt can now ambulate w/o SPC and he states that he can now negotiate stairs in reciprocating fashion vs  STS at IE  Pt no longer experiencing numbness as he was at IE and reports taking less pain medication vs previously- states that he is no longer taking Oxycodone daily  INITIAL EVAL (1/16/2020)  Pt reports to IE using SPC in RUE for ambulation s/p lumbar fusion done on 12/11/2019  Pt denies any known medical complications as a result of surgery  Prior to surgery, pt reports that he was unable to walk for 2 mos due to RLE "sciatic pain"  Since the surgery, pt reports that RLE pain has resolved and numbness is much less  He reports only intermittent and minimal numbness along ankles since surgery  Pt was instructed not to start PT for approx  1 month post-op  Pt reports that he was instructed not to lift post surgery and is unsure of continued lifting restrictions  Pt not drive long distances at this time   Discussed that driving while taking pain meds is still considered under the influence- pt reports that he is aware  Pt uses a shower chair and hand held shower head for bathing and is able to do independently  Pt also able to independently dress self  Pain  Current pain ratin  At worst pain ratin  Location: lumbosacral pain  Relieving factors: medications (Oxycodone (less frequent- no longer daily), Gabapentin, Tylenol, Muscle relaxer)  Exacerbated by: sitting > 1 hour, walking/standing > 20 minutes, stair climbing (reciprocating w/ rail)    Social Support  Steps to enter house: yes (1 short MORRO)  Stairs in house: yes (1 FF steps w/ rail)   Lives in: multiple-level home  Lives with: spouse    Employment status: not working (Pt has been out of work for approximately 4 months when sciatic pain began - prior to injury, was working as a )  Patient Goals  Patient goals for therapy: decreased pain, return to sport/leisure activities and return to work (Partially Met)          Objective     Active Range of Motion     Additional Active Range of Motion Details  DNT due to neutral spine instructions as per surgeon    Strength/Myotome Testing     Left Hip   Planes of Motion   Flexion: 4+ (*L adductor discomfort)  Abduction: 4+  External rotation: 4+    Right Hip   Planes of Motion   Flexion: 4+  Abduction: 4+  External rotation: 4+    Left Knee   Flexion: 5  Extension: 5    Right Knee   Flexion: 4+  Extension: 5    Left Ankle/Foot   Dorsiflexion: 5  Plantar flexion: 5  Great toe extension: 5    Right Ankle/Foot   Dorsiflexion: 5  Plantar flexion: 4+  Great toe extension: 5    Tests     Additional Tests Details  Gait: Pt ambulates w/ R Trendelenburg and excessive b/l lateral trunk movement; however, less lateral trunk movement observed vs at IE w/ and w/o SPC    Observation/Posture: 19 cm incision along lumbar spine; two 1 cm incisions on either side of large incision    All incisions healing well w/o s/s of infection    Palpation: Hypertonicity/tenderness w/ palpation to b/l QL, glute med, max; tenderness w/ palpation to incisions small 1 cm incisions    Special Tests (R/L):   SLR: positive/positive  Crossed SLR: negative/negative    HS Length (assessed via popliteal angle method):  60 deg/65 deg      Daily Treatment Diary    EPOC: 3/23/2020  Precautions: NEUTRAL SPINE EX ONLY; no lifting; COPD/asthma  Co- Morbidities:   Patient Active Problem List   Diagnosis    Cervical spondylolysis    Cervical stenosis of spine    Chronic bilateral low back pain    Chronic cervical radiculopathy    Elevated blood pressure reading    Nicotine dependence    Osteoarthritis of spine with radiculopathy, lumbar region    Pain syndrome, chronic    Seasonal allergies    Severe chronic obstructive pulmonary disease (HCC)    Elevated serum creatinine    Overweight (BMI 25 0-29  9)    Lumbosacral radiculopathy due to degenerative joint disease of spine    Sciatica of right side    Encounter for immunization    Herniation of intervertebral disc between L5 and S1    Spinal stenosis of lumbar region with neurogenic claudication         Manual 2/10 2/12 2/14 2/19 2/24     TPR to b/l l/s psp, QL, glute med/max  RB s/l np RB RB RB      Progress Note      RB                              Total Time  15' - 14' 14' 23'        Exercise Diary 2/10 2/12 2/14 2/19 2/24 (modified ex due to time constraints w/ progress note)    HEP instruct/handout         Recumbent Bike 5' 5' 5' 5' 5'    Hip ER stretch   HEP      Piriformis Stretch (neutral spine)   HEP      DLS: abdominal isometrics 10"x10 T/o session T/o session T/o session T/o session    DLS: hip abduction BLK DE 10"x10 BLK DE 10"x10 BLK DE 10"x10 BLK DE 10"x10     DLS: hip adduction BLK DE 10"x10 Ball  10"x10 BLK DE 10"x10 BLK DE 10"x10     Seated glute set (on pball) 10"x10 feet on bolster NP/ resum NV NP NP     DLS: marches Attempt again NV 15x alt 15x alt NP- resume nv      DLS:Bent knee fall outs 10"x10 ea 10"x10 ea  NP NP     Bridges (if able into small ROM)   NV focus on glutes NV w/ glute focus? Clamshells S/l 2x10 ea S/l 2x10 ea S/l- 10"x 10 (R) 10"x9 (L)- to fatigue S/l- 10"x10 ea     S/l multifidus engagement Std leaning on plinth 15x ea Std leaning on plinth 15x ea Std leaning on plinth 15x ea NP- resume nv Standing leaning on plinth 15x ea    DLS: SLR flexion, abduction  10x SLR 10x SLR NP- resume nv     Step ups- fwd    4" 15x ea w/ u/l UE support     TB resisted hip abd  10x ea b/l  10x abd NP- resume nv 15x abd/flex 15x abd/flex     sit to stands NV! NV!  5x glute engagement 5x glute engagement      sidestepping np- resume nv 4 laps HEP 2 laps 2 laps    Seated HS stretch - neutral spine np- resume nv 3 x30" ea 3x30" ea 30"x3 ea (avoid pain in l/s)     Seated n  glides    10x ea 10x ea                                    Modalities 2/10 2/12 2/14 2/19 2/24    CP PRN 10' h/l w/ TENS 10' h/l w/ TENS 10' h/l w/ TENS 10' h/l w/ TENS 10' h/l w/ TENS     TENS 10' w/ CP 10' w/ CP 10' w/ CP 10' w/ CP 10' w/ CP

## 2020-02-24 NOTE — LETTER
2020    Fabián Beatty MD  [facility]  [facility]    Patient: Alina Vega   YOB: 1960   Date of Visit: 2020     Encounter Diagnosis     ICD-10-CM    1  S/P lumbar fusion Z98 1    2  Lumbar pain M54 5        Dear Dr Mariusz Pascual: Thank you for your recent referral of Alina Vega  Please review the attached evaluation summary from [address] [place] [address] recent visit  Please verify that you agree with the plan of care by signing the attached order  If you have any questions or concerns, please do not hesitate to call  I sincerely appreciate the opportunity to share in the care of one of your patients and hope to have another opportunity to work with you in the near future  Sincerely,    Osbaldo Choi, PT      Referring Provider:      I certify that I have read the below Plan of Care and certify the need for these services furnished under this plan of treatment while under my care  Fabián Beatty MD  [facility]  [facility]: [phone]          PT Re-Evaluation     Today's date: 2020  Patient name: Alina Vega  : 1960  MRN: [id number]  Referring provider: Fatoumata Roe MD  Dx:   Encounter Diagnosis     ICD-10-CM    1  S/P lumbar fusion Z98 1    2  Lumbar pain M54 5                   Assessment  Assessment details: Alina Vega is a 62 y o  male being treated as an outpatient at [facility] [place] [facility] PT s/p lumbar fusion on 19  Since IE, pt has made gains in pain reduction, activity tolerance/function, ambulatory function, and strength, but continues to remain limited from max function  Pt will continue to benefit from skilled PT services in order to max function to allow pt to achieve goals in PT  Thank you       Impairments: abnormal gait, abnormal muscle tone, activity intolerance, impaired balance, impaired physical strength and pain with function  Understanding of Dx/Px/POC: good   Prognosis: good    Goals  ST  Pain decreased by 25% in 4-6 weeks  - Partially Met  2  Strength increased by 1/2 to 1 muscle grade in all deficient muscle groups in 4-6 weeks  - Partially Met    LT  Decrease pain to 1-2/10 at worst by d/c - Not Met  2  Increase ROM to [facility] for all deficient movements by d/c - DNT as per restrictions  3  Strength increased to 5 for all deficient muscle groups by d/c - Partially Met  4  IADL performance increased to max function by d/c - Partially Met  5  Recreational performance increased to max function by d/c - Not Met  6  Ambulation/stair climbing improved to max level of function by d/c - Partially Met  7  Pt will RTW w/ 2-3/10 pain at worst by d/c - Not Met    Plan  Planned modality interventions: cryotherapy and TENS  Other planned modality interventions: other modalities PRN  Planned therapy interventions: abdominal trunk stabilization, ADL retraining, IADL retraining, balance, flexibility, functional ROM exercises, gait training, graded exercise, home exercise program, manual therapy, neuromuscular re-education, patient education, postural training, strengthening, therapeutic exercise, therapeutic activities, transfer training and work reintegration  Other planned therapy interventions: other interventions PRN  Frequency: 2-3x/week  Duration in weeks: 4  Plan of Care beginning date: 2020  Plan of Care expiration date: 3/23/2020  Treatment plan discussed with: patient        Subjective Evaluation    History of Present Illness  Mechanism of injury: CURRENT LEVEL (2020):  Pt has made gains in overall activity tolerance/function since IE, but expresses frustrated that he continues to experience "stiffness" and intermittent "locking up" in his back  He cannot attribute any specific activity to onset of "locking up" aside from "stepping wrong" or "moving wrong"      Pt now able to tolerate approximately 20 minutes of ambulation prior to needing rest break due to pain  In addition, there are times pt can now ambulate w/o SPC and he states that he can now negotiate stairs in reciprocating fashion vs  STS at IE  Pt no longer experiencing numbness as he was at IE and reports taking less pain medication vs previously- states that he is no longer taking Oxycodone daily  INITIAL EVAL (2020)  Pt reports to IE using SPC in RUE for ambulation s/p lumbar fusion done on 2019  Pt denies any known medical complications as a result of surgery  Prior to surgery, pt reports that he was unable to walk for 2 mos due to RLE "sciatic pain"  Since the surgery, pt reports that RLE pain has resolved and numbness is much less  He reports only intermittent and minimal numbness along ankles since surgery  Pt was instructed not to start PT for approx  1 month post-op  Pt reports that he was instructed not to lift post surgery and is unsure of continued lifting restrictions  Pt not drive long distances at this time  Discussed that driving while taking pain meds is still considered under the influence- pt reports that he is aware  Pt uses a shower chair and hand held shower head for bathing and is able to do independently  Pt also able to independently dress self    Pain  Current pain ratin  At worst pain ratin  Location: lumbosacral pain  Relieving factors: medications (Oxycodone (less frequent- no longer daily), Gabapentin, Tylenol, Muscle relaxer)  Exacerbated by: sitting > 1 hour, walking/standing > 20 minutes, stair climbing (reciprocating w/ rail)    Social Support  Steps to enter house: yes (1 short MORRO)  Stairs in house: yes (1 FF steps w/ rail)   Lives in: multiple-level home  Lives with: spouse    Employment status: not working (Pt has been out of work for approximately 4 months when sciatic pain began - prior to injury, was working as a )  Patient Goals  Patient goals for therapy: decreased pain, return to sport/leisure activities and return to work (Partially Met)          Objective     Active Range of Motion     Additional Active Range of Motion Details  DNT due to neutral spine instructions as per surgeon    Strength/Myotome Testing     Left Hip   Planes of Motion   Flexion: 4+ (*L adductor discomfort)  Abduction: 4+  External rotation: 4+    Right Hip   Planes of Motion   Flexion: 4+  Abduction: 4+  External rotation: 4+    Left Knee   Flexion: 5  Extension: 5    Right Knee   Flexion: 4+  Extension: 5    Left Ankle/Foot   Dorsiflexion: 5  Plantar flexion: 5  Great toe extension: 5    Right Ankle/Foot   Dorsiflexion: 5  Plantar flexion: 4+  Great toe extension: 5    Tests     Additional Tests Details  Gait: Pt ambulates w/ R Trendelenburg and excessive b/l lateral trunk movement; however, less lateral trunk movement observed vs at IE w/ and w/o SPC    Observation/Posture: 19 cm incision along lumbar spine; two 1 cm incisions on either side of large incision  All incisions healing well w/o s/s of infection    Palpation: Hypertonicity/tenderness w/ palpation to b/l QL, glute med, max; tenderness w/ palpation to incisions small 1 cm incisions    Special Tests (R/L):   SLR: positive/positive  Crossed SLR: negative/negative    HS Length (assessed via popliteal angle method):  60 deg/65 deg      Daily Treatment Diary    [facility]: 3/23/2020  Precautions: NEUTRAL SPINE EX ONLY; no lifting; COPD/asthma  Co- Morbidities:   Patient Active Problem List   Diagnosis    Cervical spondylolysis    Cervical stenosis of spine    Chronic bilateral low back pain    Chronic cervical radiculopathy    Elevated blood pressure reading    Nicotine dependence    Osteoarthritis of spine with radiculopathy, lumbar region    Pain syndrome, chronic    Seasonal allergies    Severe chronic obstructive pulmonary disease (HCC)    Elevated serum creatinine    Overweight (BMI 25 0-29  9)    Lumbosacral radiculopathy due to degenerative joint disease of spine    Sciatica of right side    Encounter for immunization    Herniation of intervertebral disc between L5 and S1    Spinal stenosis of lumbar region with neurogenic claudication         Manual 2/10 2/12 2/14 2/19 2/24     TPR to b/l l/s psp, QL, glute med/max  RB s/l np RB RB RB      Progress Note      RB                              Total Time  15' - 14' 14' 23'        Exercise Diary 2/10 2/12 2/14 2/19 2/24 (modified ex due to time constraints w/ progress note)    HEP instruct/handout         Recumbent Bike 5' 5' 5' 5' 5'    Hip ER stretch   HEP      Piriformis Stretch (neutral spine)   HEP      DLS: abdominal isometrics 10"x10 T/o session T/o session T/o session T/o session    DLS: hip abduction BLK WA 10"x10 BLK WA 10"x10 BLK WA 10"x10 BLK WA 10"x10     DLS: hip adduction BLK WA 10"x10 Ball  10"x10 BLK WA 10"x10 BLK WA 10"x10     Seated glute set (on pball) 10"x10 feet on bolster NP/ resum NV NP NP     DLS: marches Attempt again NV 15x alt 15x alt NP- resume nv      DLS:Bent knee fall outs 10"x10 ea 10"x10 ea  NP NP     Bridges (if able into small ROM)   NV focus on glutes NV w/ glute focus? Clamshells S/l 2x10 ea S/l 2x10 ea S/l- 10"x 10 (R) 10"x9 (L)- to fatigue S/l- 10"x10 ea     S/l multifidus engagement Std leaning on plinth 15x ea Std leaning on plinth 15x ea Std leaning on plinth 15x ea NP- resume nv Standing leaning on plinth 15x ea    DLS: SLR flexion, abduction  10x SLR 10x SLR NP- resume nv     Step ups- fwd    4" 15x ea w/ u/l UE support     TB resisted hip abd  10x ea b/l  10x abd NP- resume nv 15x abd/flex 15x abd/flex     sit to stands NV! NV!  5x glute engagement 5x glute engagement      sidestepping np- resume nv 4 laps HEP 2 laps 2 laps    Seated HS stretch - neutral spine np- resume nv 3 x30" ea 3x30" ea 30"x3 ea (avoid pain in l/s)     Seated n  glides    10x ea 10x ea                                    Modalities 2/10 2/12 2/14 2/19 2/24    CP PRN 10' h/l w/ TENS 10' h/l w/ TENS 10' h/l w/ TENS 10' h/l w/ TENS 10' h/l w/ TENS     TENS 10' w/ CP 10' w/ CP 10' w/ CP 10' w/ CP 10' w/ CP

## 2020-02-26 DIAGNOSIS — J44.9 CHRONIC OBSTRUCTIVE PULMONARY DISEASE, UNSPECIFIED COPD TYPE (HCC): ICD-10-CM

## 2020-02-26 RX ORDER — BUDESONIDE AND FORMOTEROL FUMARATE DIHYDRATE 160; 4.5 UG/1; UG/1
AEROSOL RESPIRATORY (INHALATION)
Qty: 30.6 G | Refills: 0 | Status: SHIPPED | OUTPATIENT
Start: 2020-02-26 | End: 2020-05-26

## 2020-02-28 ENCOUNTER — OFFICE VISIT (OUTPATIENT)
Dept: PHYSICAL THERAPY | Facility: CLINIC | Age: 60
End: 2020-02-28
Payer: COMMERCIAL

## 2020-02-28 DIAGNOSIS — Z98.1 S/P LUMBAR FUSION: Primary | ICD-10-CM

## 2020-02-28 DIAGNOSIS — M54.50 LUMBAR PAIN: ICD-10-CM

## 2020-02-28 PROCEDURE — G0283 ELEC STIM OTHER THAN WOUND: HCPCS

## 2020-02-28 PROCEDURE — 97110 THERAPEUTIC EXERCISES: CPT

## 2020-02-28 PROCEDURE — 97014 ELECTRIC STIMULATION THERAPY: CPT

## 2020-02-28 PROCEDURE — 97140 MANUAL THERAPY 1/> REGIONS: CPT

## 2020-02-28 NOTE — PROGRESS NOTES
Daily Note     Today's date: 2020  Patient name: Ulises Schreiber  : 1960  MRN: [id number]  Referring provider: Sonido Herman MD  Dx:   Encounter Diagnosis     ICD-10-CM    1  S/P lumbar fusion Z98 1    2  Lumbar pain M54 5                   Subjective: Pt reports he went to a movie the first time in 5 months and he was able to sit through the entire movie w/o an exacerbation of LBP  Objective: See treatment diary below      Assessment: Tolerated treatment well  Patient willing to do ex's asked of him but reports mm ache as session cont's  Pt needs cont'd strengthening in LE and postural mm's  Plan: Continue per plan of care  Progress treatment as tolerated  Daily Treatment Diary    EPOC: 2019  Precautions: NEUTRAL SPINE EX ONLY; no lifting; COPD/asthma  Co- Morbidities:   Patient Active Problem List   Diagnosis    Cervical spondylolysis    Cervical stenosis of spine    Chronic bilateral low back pain    Chronic cervical radiculopathy    Elevated blood pressure reading    Nicotine dependence    Osteoarthritis of spine with radiculopathy, lumbar region    Pain syndrome, chronic    Seasonal allergies    Severe chronic obstructive pulmonary disease (HCC)    Elevated serum creatinine    Overweight (BMI 25 0-29  9)    Lumbosacral radiculopathy due to degenerative joint disease of spine    Sciatica of right side    Encounter for immunization    Herniation of intervertebral disc between L5 and S1    Spinal stenosis of lumbar region with neurogenic claudication         Manual 2/10 2/12 2/14 2/19 2/28   TPR to b/l l/s psp, QL, glute med/max  RB s/l np RB RB jk                                  Total Time  15' - 14' 14' 8'      Exercise Diary 2/10 2/12 2/14 2/19 2/28   HEP instruct/handout        Recumbent Bike 5' 5' 5' 5' 5'   Hip ER stretch   HEP     Piriformis Stretch (neutral spine)   HEP     DLS: abdominal isometrics 10"x10 T/o session T/o session T/o session DLS: hip abduction BLK NJ 10"x10 BLK NJ 10"x10 BLK NJ 10"x10 BLK NJ 10"x10 BLK NJ 10"x10      DLS: hip adduction BLK NJ 10"x10 Ball  10"x10 BLK NJ 10"x10 BLK NJ 10"x10 BLK NJ 10"x10   Seated glute set (on pball) 10"x10 feet on bolster NP/ resum NV NP NP 10"x10 feet on bolster   DLS: marches Attempt again NV 15x alt 15x alt NP- resume nv     DLS:Bent knee fall outs 10"x10 ea 10"x10 ea  NP NP    Bridges (if able into small ROM)   NV focus on glutes NV w/ glute focus? nv   Clamshells S/l 2x10 ea S/l 2x10 ea S/l- 10"x 10 (R) 10"x9 (L)- to fatigue S/l- 10"x10 ea S/l- 10"x10 ea   S/l multifidus engagement Std leaning on plinth 15x ea Std leaning on plinth 15x ea Std leaning on plinth 15x ea NP- resume nv NP- resume nv   DLS: SLR flexion, abduction  10x SLR 10x SLR NP- resume nv nv   Step ups- fwd    4" 15x ea w/ u/l UE support 4" 15x ea w/ u/l UE support   TB resisted hip abd  10x ea b/l  10x abd NP- resume nv 15x abd/flex 15x flex/abd/ext    sit to stands NV! NV!  5x glute engagement 5x glute engagement 10 glut engage    sidestepping np- resume nv 4 laps HEP 2 laps 3 laps   Seated HS stretch - neutral spine np- resume nv 3 x30" ea 3x30" ea 30"x3 ea (avoid pain in l/s) 30"x3 bl   Seated n  glides    10x ea np                                Modalities 2/10 2/12 2/14 2/19 2/28   CP PRN 10' h/l w/ TENS 10' h/l w/ TENS 10' h/l w/ TENS 10' h/l w/ TENS np    TENS 10' w/ CP 10' w/ CP 10' w/ CP 10' w/ CP

## 2020-03-02 ENCOUNTER — OFFICE VISIT (OUTPATIENT)
Dept: PHYSICAL THERAPY | Facility: CLINIC | Age: 60
End: 2020-03-02
Payer: COMMERCIAL

## 2020-03-02 DIAGNOSIS — M54.50 LUMBAR PAIN: ICD-10-CM

## 2020-03-02 DIAGNOSIS — Z98.1 S/P LUMBAR FUSION: Primary | ICD-10-CM

## 2020-03-02 PROCEDURE — 97010 HOT OR COLD PACKS THERAPY: CPT

## 2020-03-02 PROCEDURE — 97140 MANUAL THERAPY 1/> REGIONS: CPT

## 2020-03-02 PROCEDURE — 97110 THERAPEUTIC EXERCISES: CPT

## 2020-03-02 PROCEDURE — 97014 ELECTRIC STIMULATION THERAPY: CPT

## 2020-03-02 PROCEDURE — G0283 ELEC STIM OTHER THAN WOUND: HCPCS

## 2020-03-02 NOTE — PROGRESS NOTES
Daily Note     Today's date: 3/2/2020  Patient name: Kasey Dejesus  : 1960  MRN: [id number]  Referring provider: Dharmesh Lozada MD  Dx:   Encounter Diagnosis     ICD-10-CM    1  S/P lumbar fusion Z98 1    2  Lumbar pain M54 5                   Subjective: Pt reports feeling ok today  [place], but when he gets home everything stiffens and gets sore  Reports good relief w/ TENS used post PT visits  Pt reports pain currently 710  Objective: See treatment diary below      Assessment: Tolerated treatment well  Patient c/o increased LBP w/ standing ex's w/ addition of PTB  Pt put in supine and given stretch to allow pt to finish ex's in supine  Cont' w/ TENS w/ CP for pain relief  Pt reports good relief w/ TENS, pain decreased to 4/10 and may benefit from a home unit  Plan: Continue per plan of care  Progress treatment as tolerated  Daily Treatment Diary    EPOC: 3/23/2019  Precautions: NEUTRAL SPINE EX ONLY; no lifting; COPD/asthma  Co- Morbidities:   Patient Active Problem List   Diagnosis    Cervical spondylolysis    Cervical stenosis of spine    Chronic bilateral low back pain    Chronic cervical radiculopathy    Elevated blood pressure reading    Nicotine dependence    Osteoarthritis of spine with radiculopathy, lumbar region    Pain syndrome, chronic    Seasonal allergies    Severe chronic obstructive pulmonary disease (HCC)    Elevated serum creatinine    Overweight (BMI 25 0-29  9)    Lumbosacral radiculopathy due to degenerative joint disease of spine    Sciatica of right side    Encounter for immunization    Herniation of intervertebral disc between L5 and S1    Spinal stenosis of lumbar region with neurogenic claudication         Manual 2/10 2/12 2/14 2/19 2/28 3/2   TPR to b/l l/s psp, QL, glute med/max  RB s/l np RB RB jk JK                                     Total Time  15' - 14' 14' 8' 8' Exercise Diary 2/10 2/12 2/14 2/19 2/28 3/2   HEP instruct/handout         Recumbent Bike 5' 5' 5' 5' 5' 5'   LTR      30"x3   Piriformis Stretch (neutral spine)   HEP      DLS: abdominal isometrics 10"x10 T/o session T/o session T/o session     DLS: hip abduction BLK LA 10"x10 BLK LA 10"x10 BLK LA 10"x10 BLK LA 10"x10 BLK LA 10"x10       DLS: hip adduction BLK LA 10"x10 Ball  10"x10 BLK LA 10"x10 BLK LA 10"x10 BLK LA 10"x10    Seated glute set (on pball) 10"x10 feet on bolster NP/ resum NV NP NP 10"x10 feet on bolster nv   DLS: marches Attempt again NV 15x alt 15x alt NP- resume nv   20x alt   DLS:Bent knee fall outs 10"x10 ea 10"x10 ea  NP NP  10"x10 ea   Bridges (if able into small ROM)   NV focus on glutes NV w/ glute focus? nv nv   Clamshells S/l 2x10 ea S/l 2x10 ea S/l- 10"x 10 (R) 10"x9 (L)- to fatigue S/l- 10"x10 ea S/l- 10"x10 ea SL 5"x10 PTB   S/l multifidus engagement Std leaning on plinth 15x ea Std leaning on plinth 15x ea Std leaning on plinth 15x ea NP- resume nv NP- resume nv NP- resume nv   DLS: SLR flexion, abduction  10x SLR 10x SLR NP- resume nv nv nv   Step ups- fwd    4" 15x ea w/ u/l UE support 4" 15x ea w/ u/l UE support Defer to LBP today   TB resisted hip abd  10x ea b/l  10x abd NP- resume nv 15x abd/flex 15x flex/abd/ext 10x PTB  x3 dir    sit to stands NV! NV!  5x glute engagement 5x glute engagement 10 glut engage nv    sidestepping np- resume nv 4 laps HEP 2 laps 3 laps PTB 3 laps   Seated HS stretch - neutral spine np- resume nv 3 x30" ea 3x30" ea 30"x3 ea (avoid pain in l/s) 30"x3 bl 30"x3 bl   Seated n  glides    10x ea np                                    Modalities 2/10 2/12 2/14 2/19 2/28 3/2   CP PRN 10' h/l w/ TENS 10' h/l w/ TENS 10' h/l w/ TENS 10' h/l w/ TENS np 10' h/l w/ TENS   10' w/ CP     TENS 10' w/ CP 10' w/ CP 10' w/ CP 10' w/ CP

## 2020-03-04 ENCOUNTER — OFFICE VISIT (OUTPATIENT)
Dept: PHYSICAL THERAPY | Facility: CLINIC | Age: 60
End: 2020-03-04
Payer: COMMERCIAL

## 2020-03-04 DIAGNOSIS — Z98.1 S/P LUMBAR FUSION: Primary | ICD-10-CM

## 2020-03-04 DIAGNOSIS — M54.50 LUMBAR PAIN: ICD-10-CM

## 2020-03-04 PROCEDURE — G0283 ELEC STIM OTHER THAN WOUND: HCPCS

## 2020-03-04 PROCEDURE — 97014 ELECTRIC STIMULATION THERAPY: CPT

## 2020-03-04 PROCEDURE — 97140 MANUAL THERAPY 1/> REGIONS: CPT

## 2020-03-04 PROCEDURE — 97110 THERAPEUTIC EXERCISES: CPT

## 2020-03-04 NOTE — PROGRESS NOTES
Daily Note     Today's date: 3/4/2020  Patient name: Ayde Koenig  : 1960  MRN: [id number]  Referring provider: Odessa Lanier MD  Dx:   Encounter Diagnosis     ICD-10-CM    1  S/P lumbar fusion Z98 1    2  Lumbar pain M54 5                   Subjective: Pt reports 7/10 LBP stating "it's not too bad"  States he just recently in the last couple of weeks started washing his own back due unable to reach from LBP  Objective: See treatment diary below      Assessment: Tolerated treatment fair  Patient challenge performing DLS ex's seated unsupported  Pt reports LB gets stiff w/ pain  Pt shown use of home TENS unit but deferred taking it home due to he did not agree to sign all of the waiver  Plan: Continue per plan of care  Progress treatment as tolerated  Daily Treatment Diary    EPOC: 3/23/2019  Precautions: NEUTRAL SPINE EX ONLY; no lifting; COPD/asthma  Co- Morbidities:   Patient Active Problem List   Diagnosis    Cervical spondylolysis    Cervical stenosis of spine    Chronic bilateral low back pain    Chronic cervical radiculopathy    Elevated blood pressure reading    Nicotine dependence    Osteoarthritis of spine with radiculopathy, lumbar region    Pain syndrome, chronic    Seasonal allergies    Severe chronic obstructive pulmonary disease (HCC)    Elevated serum creatinine    Overweight (BMI 25 0-29  9)    Lumbosacral radiculopathy due to degenerative joint disease of spine    Sciatica of right side    Encounter for immunization    Herniation of intervertebral disc between L5 and S1    Spinal stenosis of lumbar region with neurogenic claudication         Manual 3/4        TPR to b/l l/s psp, QL, glute med/max JK                                       Total Time 8'           Exercise Diary 3/4    2/28 3/2   HEP instruct/handout         Recumbent Bike 5'    5' 5'   LTR      30"x3   Piriformis Stretch (neutral spine)         DLS: abdominal isometrics t/o DLS: hip abduction WV 10"x10  seated    BLK WV 10"x10       DLS: hip adduction Ball sitting  10"x10    BLK WV 10"x10    Seated glute set (on pball) 10"x10    10"x10 feet on bolster nv   DLS: marches      05Q alt   DLS:Bent knee fall outs      10"x10 ea   Bridges (if able into small ROM)     nv nv   Clamshells     S/l- 10"x10 ea SL 5"x10 PTB   S/l multifidus engagement nv    NP- resume nv NP- resume nv   DLS: SLR flexion, abduction nv    nv nv   Step ups- fwd 4" 15x ea w/ u/l UE support    4" 15x ea w/ u/l UE support Defer to LBP today   TB resisted hip abd  10x PTB  x3 dir    15x flex/abd/ext 10x PTB  x3 dir    sit to stands 10x    10 glut engage nv    sidestepping PTB  3 laps    3 laps PTB 3 laps   Seated HS stretch - neutral spine 30"x3 bl    30"x3 bl 30"x3 bl   Seated n  alyssa     np                                    Modalities 3/4        CP PRN defer         TENS 10'

## 2020-03-06 ENCOUNTER — OFFICE VISIT (OUTPATIENT)
Dept: FAMILY MEDICINE CLINIC | Facility: HOSPITAL | Age: 60
End: 2020-03-06
Payer: COMMERCIAL

## 2020-03-06 ENCOUNTER — APPOINTMENT (OUTPATIENT)
Dept: PHYSICAL THERAPY | Facility: CLINIC | Age: 60
End: 2020-03-06
Payer: COMMERCIAL

## 2020-03-06 VITALS
SYSTOLIC BLOOD PRESSURE: 140 MMHG | DIASTOLIC BLOOD PRESSURE: 88 MMHG | BODY MASS INDEX: 26.34 KG/M2 | HEIGHT: 70 IN | HEART RATE: 102 BPM | TEMPERATURE: 96.8 F | WEIGHT: 184 LBS

## 2020-03-06 DIAGNOSIS — M47.27 LUMBOSACRAL RADICULOPATHY DUE TO DEGENERATIVE JOINT DISEASE OF SPINE: ICD-10-CM

## 2020-03-06 DIAGNOSIS — F17.200 NICOTINE DEPENDENCE, UNCOMPLICATED, UNSPECIFIED NICOTINE PRODUCT TYPE: ICD-10-CM

## 2020-03-06 DIAGNOSIS — J44.9 SEVERE CHRONIC OBSTRUCTIVE PULMONARY DISEASE (HCC): Primary | ICD-10-CM

## 2020-03-06 DIAGNOSIS — G89.4 PAIN SYNDROME, CHRONIC: ICD-10-CM

## 2020-03-06 PROCEDURE — 99214 OFFICE O/P EST MOD 30 MIN: CPT | Performed by: FAMILY MEDICINE

## 2020-03-06 RX ORDER — TIZANIDINE 2 MG/1
2 TABLET ORAL 3 TIMES DAILY PRN
COMMUNITY
Start: 2020-02-26 | End: 2020-06-16

## 2020-03-06 NOTE — PROGRESS NOTES
Assessment/Plan:         Diagnoses and all orders for this visit:    Severe chronic obstructive pulmonary disease (HCC)  Comments:  Continue inhalers per current orders    Lumbosacral radiculopathy due to degenerative joint disease of spine  -     Diclofenac Sodium  MG 24 hr tablet; Take 1 tablet (100 mg total) by mouth daily    Pain syndrome, chronic    Nicotine dependence, uncomplicated, unspecified nicotine product type  Comments:  Again urged complete elimination of cigarettes    Other orders  -     tiZANidine (ZANAFLEX) 2 mg tablet; Take 2 mg by mouth 3 (three) times a day as needed          Subjective:      Patient ID: Darius Márquez is a 62 y o  male  6 month follow up  Had LS surgery in December, slowly improving and able to sit longer and walk better  Is cutting down a little on his pain and spasm medication  Taking Gabapentin once in awhile  Stopped the Oxycodone  Doing therapy three times a week   Considering TENS unit  Surgeon wanted him to start an antiinflammatory  Having more breathing troubles in the past month  Still smoking and will do the smoke cessation SL program  Some sputum with working at it hard  Some greenish mucus  Had Rx Doxycycline but didn't tolerate it well      The following portions of the patient's history were reviewed and updated as appropriate: allergies, current medications, past family history, past medical history, past social history, past surgical history and problem list     Review of Systems   Constitutional: Positive for fatigue  Negative for unexpected weight change  HENT: Negative  Eyes: Negative for visual disturbance  Respiratory: Positive for cough, shortness of breath and wheezing  Cardiovascular: Negative  Gastrointestinal: Negative  Musculoskeletal: Positive for arthralgias, back pain, gait problem and myalgias  Psychiatric/Behavioral: Negative  All other systems reviewed and are negative          Objective:      BP 140/88   Pulse 102   Temp (!) 96 8 °F (36 °C)   Ht 5' 9 5" (1 765 m)   Wt 83 5 kg (184 lb)   BMI 26 78 kg/m²          Physical Exam   Constitutional: He is oriented to person, place, and time  He appears well-developed and well-nourished  HENT:   Head: Normocephalic and atraumatic  Eyes: Pupils are equal, round, and reactive to light  Neck: No thyromegaly present  Pulmonary/Chest: He has decreased breath sounds  He has wheezes  He has rhonchi  Musculoskeletal: He exhibits no edema  Neurological: He is oriented to person, place, and time  Psychiatric: He has a normal mood and affect  His behavior is normal  Judgment and thought content normal    Nursing note and vitals reviewed

## 2020-03-09 ENCOUNTER — OFFICE VISIT (OUTPATIENT)
Dept: PHYSICAL THERAPY | Facility: CLINIC | Age: 60
End: 2020-03-09
Payer: COMMERCIAL

## 2020-03-09 DIAGNOSIS — M54.50 LUMBAR PAIN: ICD-10-CM

## 2020-03-09 DIAGNOSIS — Z98.1 S/P LUMBAR FUSION: Primary | ICD-10-CM

## 2020-03-09 PROCEDURE — 97112 NEUROMUSCULAR REEDUCATION: CPT | Performed by: PHYSICAL THERAPIST

## 2020-03-09 PROCEDURE — G0283 ELEC STIM OTHER THAN WOUND: HCPCS | Performed by: PHYSICAL THERAPIST

## 2020-03-09 PROCEDURE — 97140 MANUAL THERAPY 1/> REGIONS: CPT | Performed by: PHYSICAL THERAPIST

## 2020-03-09 PROCEDURE — 97014 ELECTRIC STIMULATION THERAPY: CPT | Performed by: PHYSICAL THERAPIST

## 2020-03-09 PROCEDURE — 97110 THERAPEUTIC EXERCISES: CPT | Performed by: PHYSICAL THERAPIST

## 2020-03-09 NOTE — PROGRESS NOTES
Daily Note     Today's date: 3/9/2020  Patient name: Rafaela Olivera  : 1960  MRN: [id number]  Referring provider: Rashaad Schwartz MD  Dx:   Encounter Diagnosis     ICD-10-CM    1  S/P lumbar fusion Z98 1    2  Lumbar pain M54 5                   Subjective: Patient reports "today is a good day"  He states he walked 8/10 of a mile with his granddaugther yesterday  Objective: See treatment diary below      Assessment: Patient demonstrated good technique with TE  Patient fatigued post treatment session but denied any increase in symptoms  Continued with TENS and CP for pain relief  Plan: Continue per plan of care  Progress treatment as tolerated  Daily Treatment Diary    EPOC: 3/23/2019  Precautions: NEUTRAL SPINE EX ONLY; no lifting; COPD/asthma  Co- Morbidities:   Patient Active Problem List   Diagnosis    Cervical spondylolysis    Cervical stenosis of spine    Chronic bilateral low back pain    Chronic cervical radiculopathy    Elevated blood pressure reading    Nicotine dependence    Osteoarthritis of spine with radiculopathy, lumbar region    Pain syndrome, chronic    Seasonal allergies    Severe chronic obstructive pulmonary disease (HCC)    Elevated serum creatinine    Overweight (BMI 25 0-29  9)    Lumbosacral radiculopathy due to degenerative joint disease of spine    Sciatica of right side    Encounter for immunization    Herniation of intervertebral disc between L5 and S1    Spinal stenosis of lumbar region with neurogenic claudication         Manual 3/4 3/9       TPR to b/l l/s psp, QL, glute med/max JK CK                                      Total Time 8' 10'          Exercise Diary 3/4 3/9   2/28 3/2   HEP instruct/handout         Recumbent Bike 5' 5'   5' 5'   LTR      30"x3   Piriformis Stretch (neutral spine)         DLS: abdominal isometrics t/o        DLS: hip abduction IA 10"x10  seated IA 10"x10 hooklying   BLK IA 10"x10       DLS: hip adduction Darling Loges sitting  10"x10 10"x10 Hooklying   BLK AL 10"x10    Seated glute set (on pball) 10"x10 10"x10   10"x10 feet on bolster nv   DLS: marches      45W alt   DLS:Bent knee fall outs      10"x10 ea   Bridges (if able into small ROM)     nv nv   Clamshells     S/l- 10"x10 ea SL 5"x10 PTB   S/l multifidus engagement nv    NP- resume nv NP- resume nv   DLS: SLR flexion, abduction nv    nv nv   Step ups- fwd 4" 15x ea w/ u/l UE support 4" 15x ea w/ u/l UE support   4" 15x ea w/ u/l UE support Defer to LBP today   TB resisted hip abd  10x PTB  x3 dir 10x PTB x3 dir   15x flex/abd/ext 10x PTB  x3 dir    sit to stands 10x 10x   10 glut engage nv    sidestepping PTB  3 laps PTB 3 laps   3 laps PTB 3 laps   Seated HS stretch - neutral spine 30"x3 bl 30"x3 bl   30"x3 bl 30"x3 bl   Seated n  glides     np                                    Modalities 3/4 3/9       CP PRN defer         TENS 10' 10' _+ CP

## 2020-03-11 ENCOUNTER — TELEPHONE (OUTPATIENT)
Dept: FAMILY MEDICINE CLINIC | Facility: HOSPITAL | Age: 60
End: 2020-03-11

## 2020-03-11 ENCOUNTER — OFFICE VISIT (OUTPATIENT)
Dept: PHYSICAL THERAPY | Facility: CLINIC | Age: 60
End: 2020-03-11
Payer: COMMERCIAL

## 2020-03-11 DIAGNOSIS — M54.50 LUMBAR PAIN: ICD-10-CM

## 2020-03-11 DIAGNOSIS — Z98.1 S/P LUMBAR FUSION: Primary | ICD-10-CM

## 2020-03-11 PROCEDURE — 97014 ELECTRIC STIMULATION THERAPY: CPT | Performed by: PHYSICAL THERAPIST

## 2020-03-11 PROCEDURE — 97140 MANUAL THERAPY 1/> REGIONS: CPT | Performed by: PHYSICAL THERAPIST

## 2020-03-11 PROCEDURE — 97112 NEUROMUSCULAR REEDUCATION: CPT | Performed by: PHYSICAL THERAPIST

## 2020-03-11 PROCEDURE — G0283 ELEC STIM OTHER THAN WOUND: HCPCS | Performed by: PHYSICAL THERAPIST

## 2020-03-11 PROCEDURE — 97010 HOT OR COLD PACKS THERAPY: CPT | Performed by: PHYSICAL THERAPIST

## 2020-03-11 NOTE — PROGRESS NOTES
Daily Note     Today's date: 3/11/2020  Patient name: Shai Ramsey  : 1960  MRN: [id number]  Referring provider: Tyron Asencio MD  Dx:   Encounter Diagnosis     ICD-10-CM    1  S/P lumbar fusion Z98 1    2  Lumbar pain M54 5                   Subjective: Patient denies any significant c/o back pain upon arrival to tx session- states that it continues to "lock up" at times, but is occurring less frequently  Pt did not report that he was experiencing more SOB today due to COPD/asthma prior to recumbent bike activity, but requested to stop after 4' for this reason  Pt reports using inhaler prior to tx session, but states that it did not seem to help much  Instructed pt in pursed lip breathing, but pt not receptive to instruction stating that it is "how it is" and did his own breathing  Pt able to recover from SOB w/ short break  Objective: See treatment diary below, Pt 5' late- accommodated pt      Assessment: Progressed core strengthening ex to seated on pball this visit w/ good tolerance- pt reports more challenged in this manner  Did not have pt perform much standing TE due to SOB today which seemed under control for remainder of tx session  Concluded w/ CP + TENS w/ good tolerance  Plan: Continue per plan of care  Progress treatment as tolerated  Daily Treatment Diary    EPOC: 3/23/2019  Precautions: NEUTRAL SPINE EX ONLY; no lifting; COPD/asthma  Co- Morbidities:   Patient Active Problem List   Diagnosis    Cervical spondylolysis    Cervical stenosis of spine    Chronic bilateral low back pain    Chronic cervical radiculopathy    Elevated blood pressure reading    Nicotine dependence    Osteoarthritis of spine with radiculopathy, lumbar region    Pain syndrome, chronic    Seasonal allergies    Severe chronic obstructive pulmonary disease (HCC)    Elevated serum creatinine    Overweight (BMI 25 0-29  9)    Lumbosacral radiculopathy due to degenerative joint disease of spine    Sciatica of right side    Encounter for immunization    Herniation of intervertebral disc between L5 and S1    Spinal stenosis of lumbar region with neurogenic claudication         Manual 3/4 3/9 3/11      TPR to b/l l/s psp, QL, glute med/max JK CK RB                                     Total Time 8' 10' 13'         Exercise Diary 3/4 3/9 3/11    Recumbent Bike 5' 5' 4'    LTR       Piriformis Stretch (neutral spine)       DLS: abdominal isometrics t/o  t/o    Seated a/p pelvic mobility on pball   10x ea dir    DLS: hip abduction MS 10"x10  seated MS 10"x10 hooklying Seated on pball MS 10"x10    DLS: hip adduction Ball sitting  10"x10 10"x10 Hooklying Seated on pball MS 10"x10    Seated glute set (on pball) 10"x10 10"x10 Seated on pball pushing into floor 10"x10 ea    DLS: marches       DLS:Bent knee fall outs       Bridges (if able into small ROM)       Clamshells       S/l multifidus engagement nv      DLS: SLR flexion, abduction nv      Step ups- fwd 4" 15x ea w/ u/l UE support 4" 15x ea w/ u/l UE support     TB resisted hip abd  10x PTB  x3 dir 10x PTB x3 dir 10x OTB x 3 dir     sit to stands 10x 10x      sidestepping PTB  3 laps PTB 3 laps OTB 3 laps    Seated HS stretch - neutral spine 30"x3 bl 30"x3 bl     Seated n  glides   10x ea                              Modalities 3/4 3/9 3/11      CP PRN defer  10'       TENS 10' 10' _+ CP 10 w/ CP

## 2020-03-11 NOTE — TELEPHONE ENCOUNTER
I spoke w/ patient and told him that his return date to work on the form we filled out in September was 3/17 (6 months)  He states he still is not able to go back to work and needs more paperwork filled out  If he drops this off, are you able to fill them out?

## 2020-03-11 NOTE — TELEPHONE ENCOUNTER
Antoinette received a letter to pt saying hes released to go back to work and he knows nothing about this, please advise

## 2020-03-13 ENCOUNTER — APPOINTMENT (OUTPATIENT)
Dept: PHYSICAL THERAPY | Facility: CLINIC | Age: 60
End: 2020-03-13
Payer: COMMERCIAL

## 2020-03-16 ENCOUNTER — OFFICE VISIT (OUTPATIENT)
Dept: PHYSICAL THERAPY | Facility: CLINIC | Age: 60
End: 2020-03-16
Payer: COMMERCIAL

## 2020-03-16 DIAGNOSIS — Z98.1 S/P LUMBAR FUSION: Primary | ICD-10-CM

## 2020-03-16 DIAGNOSIS — M54.50 LUMBAR PAIN: ICD-10-CM

## 2020-03-16 PROCEDURE — 97140 MANUAL THERAPY 1/> REGIONS: CPT

## 2020-03-16 PROCEDURE — 97014 ELECTRIC STIMULATION THERAPY: CPT

## 2020-03-16 PROCEDURE — 97110 THERAPEUTIC EXERCISES: CPT

## 2020-03-16 PROCEDURE — 97112 NEUROMUSCULAR REEDUCATION: CPT

## 2020-03-16 PROCEDURE — G0283 ELEC STIM OTHER THAN WOUND: HCPCS

## 2020-03-16 NOTE — PROGRESS NOTES
Daily Note     Today's date: 3/16/2020  Patient name: Darius Márquez  : 1960  MRN: [id number]  Referring provider: Kalpana Espinosa MD  Dx:   Encounter Diagnosis     ICD-10-CM    1  S/P lumbar fusion Z98 1    2  Lumbar pain M54 5                   Subjective: Pt reports getting over a cold  States his LBP is the least of his worries since he has COPD  Objective: See treatment diary below      Assessment: Tolerated treatment well  Patient c/o's is back tightening up w/ postural ex's  Pt w/ improved mobility  States the pain is not the same intensity it has been  Reports sitting around less and doing more ADL's  Plan: Continue per plan of care  Progress treatment as tolerated  Daily Treatment Diary    EPOC: 3/23/2019  Precautions: NEUTRAL SPINE EX ONLY; no lifting; COPD/asthma  Co- Morbidities:   Patient Active Problem List   Diagnosis    Cervical spondylolysis    Cervical stenosis of spine    Chronic bilateral low back pain    Chronic cervical radiculopathy    Elevated blood pressure reading    Nicotine dependence    Osteoarthritis of spine with radiculopathy, lumbar region    Pain syndrome, chronic    Seasonal allergies    Severe chronic obstructive pulmonary disease (HCC)    Elevated serum creatinine    Overweight (BMI 25 0-29  9)    Lumbosacral radiculopathy due to degenerative joint disease of spine    Sciatica of right side    Encounter for immunization    Herniation of intervertebral disc between L5 and S1    Spinal stenosis of lumbar region with neurogenic claudication         Manual 3/4 3/9 3/11 3/16     TPR to b/l l/s psp, QL, glute med/max JK CK RB JK                                    Total Time 8' 10' 13' 9'        Exercise Diary 3/4 3/9 3/11 3/16   Recumbent Bike 5' 5' 4' 5'   LTR       Piriformis Stretch (neutral spine)       DLS: abdominal isometrics t/o  t/o    Seated a/p pelvic mobility on pball   10x ea dir 20x ea dir   DLS: hip abduction TN 10"x10  seated MO 10"x10 hooklying Seated on pball MO 10"x10 Seated on pball MO 10"x10   DLS: hip adduction Ball sitting  10"x10 10"x10 Hooklying Seated on pball MO 10"x10 Seated on pball MO 10"x10   Seated glute set (on pball) 10"x10 10"x10 Seated on pball pushing into floor 10"x10 ea Seated on pball pushing into floor 10"x10 ea   DLS: marches       DLS:Bent knee fall outs       Bridges (if able into small ROM)       Clamshells       S/l multifidus engagement nv      DLS: SLR flexion, abduction nv      Step ups- fwd 4" 15x ea w/ u/l UE support 4" 15x ea w/ u/l UE support     TB resisted hip abd  10x PTB  x3 dir 10x PTB x3 dir 10x OTB x 3 dir 10x OTB x 3 dir    sit to stands 10x 10x      sidestepping PTB  3 laps PTB 3 laps OTB 3 laps OTB 3 laps   Seated HS stretch - neutral spine 30"x3 bl 30"x3 bl  30"x3 bl   Seated n  glides   10x ea nv                             Modalities 3/4 3/9 3/11 3/16     CP PRN defer  10' 10'      TENS 10' 10' _+ CP 10 w/ CP np

## 2020-03-18 ENCOUNTER — OFFICE VISIT (OUTPATIENT)
Dept: PHYSICAL THERAPY | Facility: CLINIC | Age: 60
End: 2020-03-18
Payer: COMMERCIAL

## 2020-03-18 DIAGNOSIS — M54.50 LUMBAR PAIN: ICD-10-CM

## 2020-03-18 DIAGNOSIS — Z98.1 S/P LUMBAR FUSION: Primary | ICD-10-CM

## 2020-03-18 PROCEDURE — 97014 ELECTRIC STIMULATION THERAPY: CPT | Performed by: PHYSICAL THERAPIST

## 2020-03-18 PROCEDURE — 97112 NEUROMUSCULAR REEDUCATION: CPT | Performed by: PHYSICAL THERAPIST

## 2020-03-18 PROCEDURE — 97010 HOT OR COLD PACKS THERAPY: CPT | Performed by: PHYSICAL THERAPIST

## 2020-03-18 PROCEDURE — 97140 MANUAL THERAPY 1/> REGIONS: CPT | Performed by: PHYSICAL THERAPIST

## 2020-03-18 PROCEDURE — G0283 ELEC STIM OTHER THAN WOUND: HCPCS | Performed by: PHYSICAL THERAPIST

## 2020-03-18 NOTE — PROGRESS NOTES
Daily Note     Today's date: 3/18/2020  Patient name: Dimitrios Boudreaux  : 1960  MRN: [id number]  Referring provider: Briana Izaguirre MD  Dx:   Encounter Diagnosis     ICD-10-CM    1  S/P lumbar fusion Z98 1    2  Lumbar pain M54 5                   Subjective: Pt continues to reports SOB related to COPD and chose to hold recumbent bike w/u for this reason  Pt also concerned of COVID-19 exposure- discussed w/ pt that if he is uncomfortable to come to clinic, he should cancel appts and transition to HEP  Pt to inform clinic of plan  Objective: See treatment diary below  Pt 10' late- accommodated pt w/ shortened tx session  Assessment: Pt tolerated tx session well in regards to back pain this visit  Improved tissue tension post manuals  Overall improved pain level reported post tx session  Plan: Continue per plan of care  Progress treatment as tolerated  Daily Treatment Diary    EPOC: 3/23/2019  Precautions: NEUTRAL SPINE EX ONLY; no lifting; COPD/asthma  Co- Morbidities:   Patient Active Problem List   Diagnosis    Cervical spondylolysis    Cervical stenosis of spine    Chronic bilateral low back pain    Chronic cervical radiculopathy    Elevated blood pressure reading    Nicotine dependence    Osteoarthritis of spine with radiculopathy, lumbar region    Pain syndrome, chronic    Seasonal allergies    Severe chronic obstructive pulmonary disease (HCC)    Elevated serum creatinine    Overweight (BMI 25 0-29  9)    Lumbosacral radiculopathy due to degenerative joint disease of spine    Sciatica of right side    Encounter for immunization    Herniation of intervertebral disc between L5 and S1    Spinal stenosis of lumbar region with neurogenic claudication         Manual 3/4 3/9 3/11 3/16 3/18    TPR to b/l l/s psp, QL, glute med/max JK CK RB JK RB                                   Total Time 8' 10' 13' 9' 15'       Exercise Diary 3/4 3/9 3/11 3/16 3/18    Recumbent Bike 5' 5' 4' 5' Held- SOB due to COPD    LTR         Piriformis Stretch (neutral spine)         DLS: abdominal isometrics t/o  t/o      Seated a/p pelvic mobility on pball   10x ea dir 20x ea dir 20x ea dir a/p and m/l    DLS: hip abduction SC 10"x10  seated SC 10"x10 hooklying Seated on pball SC 10"x10 Seated on pball SC 10"x10 Seated on pball SC 10"x10    DLS: hip adduction Ball sitting  10"x10 10"x10 Hooklying Seated on pball SC 10"x10 Seated on pball SC 10"x10 Seated on pball SC 10"x10    Seated glute set (on pball) 10"x10 10"x10 Seated on pball pushing into floor 10"x10 ea Seated on pball pushing into floor 10"x10 ea NP- resume nv    DLS: marches         DLS:Bent knee fall outs         Bridges (if able into small ROM)         Clamshells         S/l multifidus engagement nv        DLS: SLR flexion, abduction nv        Step ups- fwd 4" 15x ea w/ u/l UE support 4" 15x ea w/ u/l UE support       TB resisted hip abd  10x PTB  x3 dir 10x PTB x3 dir 10x OTB x 3 dir 10x OTB x 3 dir 15x ea GTB x 3 dir     sit to stands 10x 10x        sidestepping PTB  3 laps PTB 3 laps OTB 3 laps OTB 3 laps GTB 3 laps    Seated HS stretch - neutral spine 30"x3 bl 30"x3 bl  30"x3 bl 30"x3 b/l - chair    Seated n  glides   10x ea nv 10x ea                                    Modalities 3/4 3/9 3/11 3/16 3/18    CP PRN defer  10' 10' 10'     TENS 10' 10' _+ CP 10 w/ CP np 10'

## 2020-03-20 ENCOUNTER — APPOINTMENT (OUTPATIENT)
Dept: PHYSICAL THERAPY | Facility: CLINIC | Age: 60
End: 2020-03-20
Payer: COMMERCIAL

## 2020-03-25 ENCOUNTER — TRANSCRIBE ORDERS (OUTPATIENT)
Dept: PHYSICAL THERAPY | Facility: CLINIC | Age: 60
End: 2020-03-25

## 2020-03-25 ENCOUNTER — EVALUATION (OUTPATIENT)
Dept: PHYSICAL THERAPY | Facility: CLINIC | Age: 60
End: 2020-03-25
Payer: COMMERCIAL

## 2020-03-25 DIAGNOSIS — M54.50 LUMBAR PAIN: ICD-10-CM

## 2020-03-25 DIAGNOSIS — Z98.1 S/P LUMBAR FUSION: Primary | ICD-10-CM

## 2020-03-25 PROCEDURE — 97112 NEUROMUSCULAR REEDUCATION: CPT | Performed by: PHYSICAL THERAPIST

## 2020-03-25 PROCEDURE — 97140 MANUAL THERAPY 1/> REGIONS: CPT | Performed by: PHYSICAL THERAPIST

## 2020-03-25 PROCEDURE — 97110 THERAPEUTIC EXERCISES: CPT | Performed by: PHYSICAL THERAPIST

## 2020-03-25 NOTE — LETTER
2020    Marylou Thapa MD  [facility]  AmadorAmarjit jonas    Patient: Chris Mccarty   YOB: 1960   Date of Visit: 3/25/2020     Encounter Diagnosis     ICD-10-CM    1  S/P lumbar fusion Z98 1    2  Lumbar pain M54 5        Dear Dr Rima Le: Thank you for your recent referral of Chris Mccarty  Please review the attached evaluation summary from [address] [place] [address] Main recent visit  Please verify that you agree with the plan of care by signing the attached order  If you have any questions or concerns, please do not hesitate to call  I sincerely appreciate the opportunity to share in the care of one of your patients and hope to have another opportunity to work with you in the near future  Sincerely,    Osbaldo Choi, PT      Referring Provider:      I certify that I have read the below Plan of Care and certify the need for these services furnished under this plan of treatment while under my care  Marylou Thapa MD  [facility]  [address] [facility]: [phone]          PT Discharge    Today's date: 3/25/2020  Patient name: Chris Mccarty  : 1960  MRN: [id number]  Referring provider: Sharlon Ganser, MD  Dx:   Encounter Diagnosis     ICD-10-CM    1  S/P lumbar fusion Z98 1    2  Lumbar pain M54 5                   Assessment  Assessment details: Chris Mccarty is a 62 y o  male being treated as an outpatient at [facility] PT s/p lumbar fusion on 19  Since previous progress note, pt has made gains in activity tolerance/function, ambulatory function, and strength, but seems to be nearing a plateau in progress over the past couple of weeks  Pt also fearful of unnecessary possible exposure to COVID-19 especially due to hx of COPD   Due to nearing plateau and possible COVID-19 exposure, pt will be d/c'd from skilled PT services and transitioned to updated HEP at this time  Pt encouraged to contact clinic w/ any questions/concerns post d/c  Thank you  Impairments: abnormal gait, abnormal muscle tone, activity intolerance and pain with function  Understanding of Dx/Px/POC: good   Prognosis: good    Goals  ST  Pain decreased by 25% in 4-6 weeks  - Partially Met (from IE)  2  Strength increased by 1/2 to 1 muscle grade in all deficient muscle groups in 4-6 weeks  - Partially Met    LT  Decrease pain to 1-2/10 at worst by d/c - Not Met  2  Increase ROM to [facility] for all deficient movements by d/c - Not Met gentle rotation; DNT remainder of mobility as per restrictions  3  Strength increased to 5 for all deficient muscle groups by d/c - Partially Met  4  IADL performance increased to max function by d/c - Partially Met  5  Recreational performance increased to max function by d/c - Not Met  6  Ambulation/stair climbing improved to max level of function by d/c - Partially Met  7  Pt will RTW w/ 2-3/10 pain at worst by d/c - Not Met    Plan  Planned therapy interventions: home exercise program  Frequency: d/c from skilled PT services; transition to updated HEP  Plan of Care beginning date: 3/25/2020  Plan of Care expiration date: 3/25/2020  Treatment plan discussed with: patient        Subjective Evaluation    History of Present Illness  Mechanism of injury: CURRENT LEVEL (3/25/2020):   Pt reports some improvements in activity tolerance and function since IE  Although not recommended, he reports being able to tolerate sitting for approximately 1 -1 5 hrs and walking/standing for 25 minutes prior to onset of increased pain  He can negotiate steps in reciprocating fashion, but if having more back pain may intermittently need to "crawl" up the stairs  Despite being frustrated at slow progress and continued higher levels of pain, he reports that he has been experiencing less frequent "locking up" in l/s and can "move easier" vs at previous progress note  He reports that he is partially compliant w/ HEP  Discussed the importance of continuation of daily HEP and even performing exercises prior to getting OOB since this is one of the most painful times of day for patient  Pt just had f/u w/ PCP several days ago and was prescribed anti-inflammatory medication- he is hoping that this will also help to improve symptoms as well  CURRENT LEVEL (2/24/2020):  Pt has made gains in overall activity tolerance/function since IE, but expresses frustrated that he continues to experience "stiffness" and intermittent "locking up" in his back  He cannot attribute any specific activity to onset of "locking up" aside from "stepping wrong" or "moving wrong"  Pt now able to tolerate approximately 20 minutes of ambulation prior to needing rest break due to pain  In addition, there are times pt can now ambulate w/o SPC and he states that he can now negotiate stairs in reciprocating fashion vs  STS at IE  Pt no longer experiencing numbness as he was at IE and reports taking less pain medication vs previously- states that he is no longer taking Oxycodone daily  INITIAL EVAL (1/16/2020)  Pt reports to IE using SPC in RUE for ambulation s/p lumbar fusion done on 12/11/2019  Pt denies any known medical complications as a result of surgery  Prior to surgery, pt reports that he was unable to walk for 2 mos due to RLE "sciatic pain"  Since the surgery, pt reports that RLE pain has resolved and numbness is much less  He reports only intermittent and minimal numbness along ankles since surgery  Pt was instructed not to start PT for approx  1 month post-op  Pt reports that he was instructed not to lift post surgery and is unsure of continued lifting restrictions  Pt not drive long distances at this time  Discussed that driving while taking pain meds is still considered under the influence- pt reports that he is aware      Pt uses a shower chair and hand held shower head for bathing and is able to do independently  Pt also able to independently dress self    Pain  Current pain ratin  At best pain ratin  At worst pain ratin  Location: lumbosacral pain  Relieving factors: medications (Gabapentin (rarely), Tylenol, Muscle relaxer; anti-inflammatory)  Exacerbated by: sitting > 1 -1 5 hrs,  walking/standing > 25 minutes, stair climbing (reciprocating, but intermittently needs to "crawl" if back hurting)    Social Support  Steps to enter house: yes (1 short MORRO)  Stairs in house: yes (1 FF steps w/ rail)   Lives in: multiple-level home  Lives with: spouse    Employment status: not working (prior to injury, was working as a )  Patient Goals  Patient goals for therapy: decreased pain, return to sport/leisure activities and return to work (Partially Met)          Objective     Active Range of Motion     Lumbar   Left rotation:  Restriction level: minimal  Right rotation:  Restriction level: minimal    Additional Active Range of Motion Details  Only tested gentle rotation due to neutral spine instructions as per surgeon    Strength/Myotome Testing     Left Hip   Planes of Motion   Flexion: 4+  Abduction: 4+  External rotation: 4+    Right Hip   Planes of Motion   Flexion: 5  Abduction: 4+  External rotation: 4+    Left Knee   Flexion: 5  Extension: 5    Right Knee   Flexion: 5  Extension: 5    Left Ankle/Foot   Dorsiflexion: 5  Plantar flexion: 5  Great toe extension: 5    Right Ankle/Foot   Dorsiflexion: 5  Plantar flexion: 5  Great toe extension: 5    Tests     Additional Tests Details  Gait: Pt ambulates w/o AD w/ excessive R shoulder extension and R trunk rotation    Stair Climbing: Pt able to negotiate stairs in reciprocating fashion w/o use of rail w/o any major impairments noted    Palpation: Hypertonicity/tenderness w/ palpation to b/l QL, glute med, max; tenderness w/ palpation to small 1 cm incisions    Special Tests (R/L):   SLR: positive/negative  Crossed SLR: negative/negative    HS Length (assessed via popliteal angle method):  60 deg/70 deg      Daily Treatment Diary    Precautions: NEUTRAL SPINE EX ONLY; no lifting; COPD/asthma  Co- Morbidities:   Patient Active Problem List   Diagnosis    Cervical spondylolysis    Cervical stenosis of spine    Chronic bilateral low back pain    Chronic cervical radiculopathy    Elevated blood pressure reading    Nicotine dependence    Osteoarthritis of spine with radiculopathy, lumbar region    Pain syndrome, chronic    Seasonal allergies    Severe chronic obstructive pulmonary disease (HCC)    Elevated serum creatinine    Overweight (BMI 25 0-29  9)    Lumbosacral radiculopathy due to degenerative joint disease of spine    Sciatica of right side    Encounter for immunization    Herniation of intervertebral disc between L5 and S1    Spinal stenosis of lumbar region with neurogenic claudication         Manual 3/4 3/9 3/11 3/16 3/18 3/25   TPR to b/l l/s psp, QL, glute med/max JK CK RB JK RB RB                        Progress Note      RB    Total Time 8' 10' 13' 9' 15'       Exercise Diary 3/4 3/9 3/11 3/16 3/18 3/25 (modified ex due to time constraints w/ progress note)   Recumbent Bike 5' 5' 4' 5' Held- SOB due to COPD 5'   LTR         Piriformis Stretch (neutral spine)         DLS: abdominal isometrics t/o  t/o      Seated a/p pelvic mobility on pball   10x ea dir 20x ea dir 20x ea dir a/p and m/l 20x ea dir a/p and m/l    DLS: hip abduction AR 10"x10  seated AR 10"x10 hooklying Seated on pball AR 10"x10 Seated on pball AR 10"x10 Seated on pball AR 10"x10 Seated on pball AR 10"x10   DLS: hip adduction Ball sitting  10"x10 10"x10 Hooklying Seated on pball AR 10"x10 Seated on pball AR 10"x10 Seated on pball AR 10"x10 Seated on pball AR 10x10   Seated glute set (on pball) 10"x10 10"x10 Seated on pball pushing into floor 10"x10 ea Seated on pball pushing into floor 10"x10 ea NP- resume nv DLS: marches         DLS:Bent knee fall outs         Bridges (if able into small ROM)         Clamshells         S/l multifidus engagement nv        DLS: SLR flexion, abduction nv        Step ups- fwd 4" 15x ea w/ u/l UE support 4" 15x ea w/ u/l UE support       TB resisted hip abd  10x PTB  x3 dir 10x PTB x3 dir 10x OTB x 3 dir 10x OTB x 3 dir 15x ea GTB x 3 dir     sit to stands 10x 10x        sidestepping PTB  3 laps PTB 3 laps OTB 3 laps OTB 3 laps GTB 3 laps    Seated HS stretch - neutral spine 30"x3 bl 30"x3 bl  30"x3 bl 30"x3 b/l - chair 30"x3 b/l- chair   Seated n  glides   10x ea nv 10x ea                                    Modalities 3/4 3/9 3/11 3/16 3/18    CP PRN defer  10' 10' 10'     TENS 10' 10' _+ CP 10 w/ CP np 10'

## 2020-03-25 NOTE — PROGRESS NOTES
PT Discharge    Today's date: 3/25/2020  Patient name: Yanna Adames  : 1960  MRN: [id number]  Referring provider: Sydney Kapoor MD  Dx:   Encounter Diagnosis     ICD-10-CM    1  S/P lumbar fusion Z98 1    2  Lumbar pain M54 5                   Assessment  Assessment details: Yanna Adames is a 62 y o  male being treated as an outpatient at [place] [id number] PT s/p lumbar fusion on 19  Since previous progress note, pt has made gains in activity tolerance/function, ambulatory function, and strength, but seems to be nearing a plateau in progress over the past couple of weeks  Pt also fearful of unnecessary possible exposure to COVID-19 especially due to hx of COPD  Due to nearing plateau and possible COVID-19 exposure, pt will be d/c'd from skilled PT services and transitioned to updated HEP at this time  Pt encouraged to contact clinic w/ any questions/concerns post d/c  Thank you  Impairments: abnormal gait, abnormal muscle tone, activity intolerance and pain with function  Understanding of Dx/Px/POC: good   Prognosis: good    Goals  ST  Pain decreased by 25% in 4-6 weeks  - Partially Met (from IE)  2  Strength increased by 1/2 to 1 muscle grade in all deficient muscle groups in 4-6 weeks  - Partially Met    LT  Decrease pain to 1-2/10 at worst by d/c - Not Met  2  Increase ROM to [facility] for all deficient movements by d/c - Not Met gentle rotation; DNT remainder of mobility as per restrictions  3  Strength increased to 5 for all deficient muscle groups by d/c - Partially Met  4  IADL performance increased to max function by d/c - Partially Met  5  Recreational performance increased to max function by d/c - Not Met  6  Ambulation/stair climbing improved to max level of function by d/c - Partially Met  7   Pt will RTW w/ 2-3/10 pain at worst by d/c - Not Met    Plan  Planned therapy interventions: home exercise program  Frequency: d/c from skilled PT services; transition to updated HEP   Plan of Care beginning date: 3/25/2020  Plan of Care expiration date: 3/25/2020  Treatment plan discussed with: patient        Subjective Evaluation    History of Present Illness  Mechanism of injury: CURRENT LEVEL (3/25/2020):   Pt reports some improvements in activity tolerance and function since IE  Although not recommended, he reports being able to tolerate sitting for approximately 1 -1 5 hrs and walking/standing for 25 minutes prior to onset of increased pain  He can negotiate steps in reciprocating fashion, but if having more back pain may intermittently need to "crawl" up the stairs  Despite being frustrated at slow progress and continued higher levels of pain, he reports that he has been experiencing less frequent "locking up" in l/s and can "move easier" vs at previous progress note  He reports that he is partially compliant w/ HEP  Discussed the importance of continuation of daily HEP and even performing exercises prior to getting OOB since this is one of the most painful times of day for patient  Pt just had f/u w/ PCP several days ago and was prescribed anti-inflammatory medication- he is hoping that this will also help to improve symptoms as well  CURRENT LEVEL (2/24/2020):  Pt has made gains in overall activity tolerance/function since IE, but expresses frustrated that he continues to experience "stiffness" and intermittent "locking up" in his back  He cannot attribute any specific activity to onset of "locking up" aside from "stepping wrong" or "moving wrong"  Pt now able to tolerate approximately 20 minutes of ambulation prior to needing rest break due to pain  In addition, there are times pt can now ambulate w/o SPC and he states that he can now negotiate stairs in reciprocating fashion vs  STS at IE  Pt no longer experiencing numbness as he was at IE and reports taking less pain medication vs previously- states that he is no longer taking Oxycodone daily      INITIAL AMMON (2020)  Pt reports to IE using SPC in RUE for ambulation s/p lumbar fusion done on 2019  Pt denies any known medical complications as a result of surgery  Prior to surgery, pt reports that he was unable to walk for 2 mos due to RLE "sciatic pain"  Since the surgery, pt reports that RLE pain has resolved and numbness is much less  He reports only intermittent and minimal numbness along ankles since surgery  Pt was instructed not to start PT for approx  1 month post-op  Pt reports that he was instructed not to lift post surgery and is unsure of continued lifting restrictions  Pt not drive long distances at this time  Discussed that driving while taking pain meds is still considered under the influence- pt reports that he is aware  Pt uses a shower chair and hand held shower head for bathing and is able to do independently  Pt also able to independently dress self    Pain  Current pain ratin  At best pain ratin  At worst pain ratin  Location: lumbosacral pain  Relieving factors: medications (Gabapentin (rarely), Tylenol, Muscle relaxer; anti-inflammatory)  Exacerbated by: sitting > 1 -1 5 hrs,  walking/standing > 25 minutes, stair climbing (reciprocating, but intermittently needs to "crawl" if back hurting)    Social Support  Steps to enter house: yes (1 short MORRO)  Stairs in house: yes (1 FF steps w/ rail)   Lives in: multiple-level home  Lives with: spouse    Employment status: not working (prior to injury, was working as a )  Patient Goals  Patient goals for therapy: decreased pain, return to sport/leisure activities and return to work (Partially Met)          Objective     Active Range of Motion     Lumbar   Left rotation:  Restriction level: minimal  Right rotation:  Restriction level: minimal    Additional Active Range of Motion Details  Only tested gentle rotation due to neutral spine instructions as per surgeon    Strength/Myotome Testing     Left Hip Planes of Motion   Flexion: 4+  Abduction: 4+  External rotation: 4+    Right Hip   Planes of Motion   Flexion: 5  Abduction: 4+  External rotation: 4+    Left Knee   Flexion: 5  Extension: 5    Right Knee   Flexion: 5  Extension: 5    Left Ankle/Foot   Dorsiflexion: 5  Plantar flexion: 5  Great toe extension: 5    Right Ankle/Foot   Dorsiflexion: 5  Plantar flexion: 5  Great toe extension: 5    Tests     Additional Tests Details  Gait: Pt ambulates w/o AD w/ excessive R shoulder extension and R trunk rotation    Stair Climbing: Pt able to negotiate stairs in reciprocating fashion w/o use of rail w/o any major impairments noted    Palpation: Hypertonicity/tenderness w/ palpation to b/l QL, glute med, max; tenderness w/ palpation to small 1 cm incisions    Special Tests (R/L):   SLR: positive/negative  Crossed SLR: negative/negative    HS Length (assessed via popliteal angle method):  60 deg/70 deg      Daily Treatment Diary    Precautions: NEUTRAL SPINE EX ONLY; no lifting; COPD/asthma  Co- Morbidities:   Patient Active Problem List   Diagnosis    Cervical spondylolysis    Cervical stenosis of spine    Chronic bilateral low back pain    Chronic cervical radiculopathy    Elevated blood pressure reading    Nicotine dependence    Osteoarthritis of spine with radiculopathy, lumbar region    Pain syndrome, chronic    Seasonal allergies    Severe chronic obstructive pulmonary disease (HCC)    Elevated serum creatinine    Overweight (BMI 25 0-29  9)    Lumbosacral radiculopathy due to degenerative joint disease of spine    Sciatica of right side    Encounter for immunization    Herniation of intervertebral disc between L5 and S1    Spinal stenosis of lumbar region with neurogenic claudication         Manual 3/4 3/9 3/11 3/16 3/18 3/25   TPR to b/l l/s psp, QL, glute med/max JK CK RB JK RB RB                        Progress Note      RB    Total Time 8' 10' 13' 9' 15'       Exercise Diary 3/4 3/9 3/11 3/16 3/18 3/25 (modified ex due to time constraints w/ progress note)   Recumbent Bike 5' 5' 4' 5' Held- SOB due to COPD 5'   LTR         Piriformis Stretch (neutral spine)         DLS: abdominal isometrics t/o  t/o      Seated a/p pelvic mobility on pball   10x ea dir 20x ea dir 20x ea dir a/p and m/l 20x ea dir a/p and m/l    DLS: hip abduction UT 10"x10  seated UT 10"x10 hooklying Seated on pball UT 10"x10 Seated on pball UT 10"x10 Seated on pball UT 10"x10 Seated on pball UT 10"x10   DLS: hip adduction Ball sitting  10"x10 10"x10 Hooklying Seated on pball UT 10"x10 Seated on pball UT 10"x10 Seated on pball UT 10"x10 Seated on pball UT 10x10   Seated glute set (on pball) 10"x10 10"x10 Seated on pball pushing into floor 10"x10 ea Seated on pball pushing into floor 10"x10 ea NP- resume nv    DLS: marches         DLS:Bent knee fall outs         Bridges (if able into small ROM)         Clamshells         S/l multifidus engagement nv        DLS: SLR flexion, abduction nv        Step ups- fwd 4" 15x ea w/ u/l UE support 4" 15x ea w/ u/l UE support       TB resisted hip abd  10x PTB  x3 dir 10x PTB x3 dir 10x OTB x 3 dir 10x OTB x 3 dir 15x ea GTB x 3 dir     sit to stands 10x 10x        sidestepping PTB  3 laps PTB 3 laps OTB 3 laps OTB 3 laps GTB 3 laps    Seated HS stretch - neutral spine 30"x3 bl 30"x3 bl  30"x3 bl 30"x3 b/l - chair 30"x3 b/l- chair   Seated n  glides   10x ea nv 10x ea                                    Modalities 3/4 3/9 3/11 3/16 3/18    CP PRN defer  10' 10' 10'     TENS 10' 10' _+ CP 10 w/ CP np 10'

## 2020-03-27 ENCOUNTER — APPOINTMENT (OUTPATIENT)
Dept: PHYSICAL THERAPY | Facility: CLINIC | Age: 60
End: 2020-03-27
Payer: COMMERCIAL

## 2020-03-27 ENCOUNTER — TELEPHONE (OUTPATIENT)
Dept: FAMILY MEDICINE CLINIC | Facility: HOSPITAL | Age: 60
End: 2020-03-27

## 2020-03-30 ENCOUNTER — APPOINTMENT (OUTPATIENT)
Dept: PHYSICAL THERAPY | Facility: CLINIC | Age: 60
End: 2020-03-30
Payer: COMMERCIAL

## 2020-05-12 ENCOUNTER — OFFICE VISIT (OUTPATIENT)
Dept: FAMILY MEDICINE CLINIC | Facility: HOSPITAL | Age: 60
End: 2020-05-12
Payer: COMMERCIAL

## 2020-05-12 VITALS
TEMPERATURE: 98 F | HEIGHT: 70 IN | DIASTOLIC BLOOD PRESSURE: 84 MMHG | SYSTOLIC BLOOD PRESSURE: 148 MMHG | HEART RATE: 94 BPM | WEIGHT: 186 LBS | BODY MASS INDEX: 26.63 KG/M2

## 2020-05-12 DIAGNOSIS — M47.27 LUMBOSACRAL RADICULOPATHY DUE TO DEGENERATIVE JOINT DISEASE OF SPINE: Primary | ICD-10-CM

## 2020-05-12 DIAGNOSIS — J44.9 SEVERE CHRONIC OBSTRUCTIVE PULMONARY DISEASE (HCC): ICD-10-CM

## 2020-05-12 PROCEDURE — 99213 OFFICE O/P EST LOW 20 MIN: CPT | Performed by: FAMILY MEDICINE

## 2020-05-26 DIAGNOSIS — J44.9 CHRONIC OBSTRUCTIVE PULMONARY DISEASE, UNSPECIFIED COPD TYPE (HCC): ICD-10-CM

## 2020-05-26 RX ORDER — BUDESONIDE AND FORMOTEROL FUMARATE DIHYDRATE 160; 4.5 UG/1; UG/1
AEROSOL RESPIRATORY (INHALATION)
Qty: 30.6 G | Refills: 3 | Status: SHIPPED | OUTPATIENT
Start: 2020-05-26 | End: 2021-05-21

## 2020-06-16 ENCOUNTER — HOSPITAL ENCOUNTER (OUTPATIENT)
Facility: HOSPITAL | Age: 60
Setting detail: OBSERVATION
Discharge: HOME | End: 2020-06-17
Attending: EMERGENCY MEDICINE | Admitting: INTERNAL MEDICINE
Payer: COMMERCIAL

## 2020-06-16 ENCOUNTER — APPOINTMENT (EMERGENCY)
Dept: CT IMAGING | Facility: HOSPITAL | Age: 60
End: 2020-06-16
Payer: COMMERCIAL

## 2020-06-16 ENCOUNTER — APPOINTMENT (EMERGENCY)
Dept: RADIOLOGY | Facility: HOSPITAL | Age: 60
End: 2020-06-16
Payer: COMMERCIAL

## 2020-06-16 DIAGNOSIS — R07.9 CHEST PAIN: ICD-10-CM

## 2020-06-16 DIAGNOSIS — R42 VERTIGO: Primary | ICD-10-CM

## 2020-06-16 PROBLEM — R07.89 OTHER CHEST PAIN: Status: ACTIVE | Noted: 2020-06-16

## 2020-06-16 PROBLEM — I10 ESSENTIAL HYPERTENSION: Status: ACTIVE | Noted: 2020-06-16

## 2020-06-16 LAB
ALBUMIN SERPL BCP-MCNC: 3.9 G/DL (ref 3.5–5)
ALP SERPL-CCNC: 86 U/L (ref 46–116)
ALT SERPL W P-5'-P-CCNC: 33 U/L (ref 12–78)
AMPHETAMINES SERPL QL SCN: NEGATIVE
ANION GAP SERPL CALCULATED.3IONS-SCNC: 2 MMOL/L (ref 4–13)
APTT PPP: 33 SECONDS (ref 23–37)
AST SERPL W P-5'-P-CCNC: 23 U/L (ref 5–45)
ATRIAL RATE: 82 BPM
BARBITURATES UR QL: NEGATIVE
BASOPHILS # BLD AUTO: 0.05 THOUSANDS/ΜL (ref 0–0.1)
BASOPHILS NFR BLD AUTO: 1 % (ref 0–1)
BENZODIAZ UR QL: NEGATIVE
BILIRUB SERPL-MCNC: 0.6 MG/DL (ref 0.2–1)
BUN SERPL-MCNC: 12 MG/DL (ref 5–25)
CALCIUM SERPL-MCNC: 9.2 MG/DL (ref 8.3–10.1)
CHLORIDE SERPL-SCNC: 106 MMOL/L (ref 100–108)
CLARITY, POC: CLEAR
CO2 SERPL-SCNC: 33 MMOL/L (ref 21–32)
COCAINE UR QL: NEGATIVE
COLOR, POC: YELLOW
CREAT SERPL-MCNC: 1.09 MG/DL (ref 0.6–1.3)
EOSINOPHIL # BLD AUTO: 0.32 THOUSAND/ΜL (ref 0–0.61)
EOSINOPHIL NFR BLD AUTO: 4 % (ref 0–6)
ERYTHROCYTE [DISTWIDTH] IN BLOOD BY AUTOMATED COUNT: 12 % (ref 11.6–15.1)
EXT BILIRUBIN, UA: NORMAL
EXT BLOOD URINE: NORMAL
EXT GLUCOSE, UA: NORMAL
EXT KETONES: NORMAL
EXT NITRITE, UA: NORMAL
EXT PH, UA: 7
EXT PROTEIN, UA: NORMAL
EXT SPECIFIC GRAVITY, UA: 1
EXT UROBILINOGEN: 0.2
GFR SERPL CREATININE-BSD FRML MDRD: 72 ML/MIN/1.73SQ M
GLUCOSE SERPL-MCNC: 103 MG/DL (ref 65–140)
HCT VFR BLD AUTO: 46.9 % (ref 36.5–49.3)
HGB BLD-MCNC: 15.4 G/DL (ref 12–17)
IMM GRANULOCYTES # BLD AUTO: 0.01 THOUSAND/UL (ref 0–0.2)
IMM GRANULOCYTES NFR BLD AUTO: 0 % (ref 0–2)
INR PPP: 1.02 (ref 0.84–1.19)
LYMPHOCYTES # BLD AUTO: 1.5 THOUSANDS/ΜL (ref 0.6–4.47)
LYMPHOCYTES NFR BLD AUTO: 20 % (ref 14–44)
MCH RBC QN AUTO: 33 PG (ref 26.8–34.3)
MCHC RBC AUTO-ENTMCNC: 32.8 G/DL (ref 31.4–37.4)
MCV RBC AUTO: 100 FL (ref 82–98)
METHADONE UR QL: NEGATIVE
MONOCYTES # BLD AUTO: 0.62 THOUSAND/ΜL (ref 0.17–1.22)
MONOCYTES NFR BLD AUTO: 8 % (ref 4–12)
NEUTROPHILS # BLD AUTO: 4.97 THOUSANDS/ΜL (ref 1.85–7.62)
NEUTS SEG NFR BLD AUTO: 67 % (ref 43–75)
NRBC BLD AUTO-RTO: 0 /100 WBCS
OPIATES UR QL SCN: NEGATIVE
P AXIS: 78 DEGREES
PCP UR QL: NEGATIVE
PLATELET # BLD AUTO: 239 THOUSANDS/UL (ref 149–390)
PMV BLD AUTO: 11.5 FL (ref 8.9–12.7)
POTASSIUM SERPL-SCNC: 4.2 MMOL/L (ref 3.5–5.3)
PR INTERVAL: 138 MS
PROT SERPL-MCNC: 7.5 G/DL (ref 6.4–8.2)
PROTHROMBIN TIME: 13.1 SECONDS (ref 11.6–14.5)
QRS AXIS: 64 DEGREES
QRSD INTERVAL: 90 MS
QT INTERVAL: 374 MS
QTC INTERVAL: 436 MS
RBC # BLD AUTO: 4.67 MILLION/UL (ref 3.88–5.62)
SODIUM SERPL-SCNC: 141 MMOL/L (ref 136–145)
T WAVE AXIS: 77 DEGREES
THC UR QL: NEGATIVE
TROPONIN I SERPL-MCNC: <0.02 NG/ML
VENTRICULAR RATE: 82 BPM
WBC # BLD AUTO: 7.47 THOUSAND/UL (ref 4.31–10.16)
WBC # BLD EST: NORMAL 10*3/UL

## 2020-06-16 PROCEDURE — 99220 PR INITIAL OBSERVATION CARE/DAY 70 MINUTES: CPT | Performed by: INTERNAL MEDICINE

## 2020-06-16 PROCEDURE — 80053 COMPREHEN METABOLIC PANEL: CPT | Performed by: EMERGENCY MEDICINE

## 2020-06-16 PROCEDURE — 99285 EMERGENCY DEPT VISIT HI MDM: CPT | Performed by: EMERGENCY MEDICINE

## 2020-06-16 PROCEDURE — 70496 CT ANGIOGRAPHY HEAD: CPT

## 2020-06-16 PROCEDURE — 70498 CT ANGIOGRAPHY NECK: CPT

## 2020-06-16 PROCEDURE — 93010 ELECTROCARDIOGRAM REPORT: CPT | Performed by: INTERNAL MEDICINE

## 2020-06-16 PROCEDURE — 85730 THROMBOPLASTIN TIME PARTIAL: CPT | Performed by: EMERGENCY MEDICINE

## 2020-06-16 PROCEDURE — 80307 DRUG TEST PRSMV CHEM ANLYZR: CPT | Performed by: EMERGENCY MEDICINE

## 2020-06-16 PROCEDURE — 71045 X-RAY EXAM CHEST 1 VIEW: CPT

## 2020-06-16 PROCEDURE — 81002 URINALYSIS NONAUTO W/O SCOPE: CPT | Performed by: EMERGENCY MEDICINE

## 2020-06-16 PROCEDURE — 36415 COLL VENOUS BLD VENIPUNCTURE: CPT | Performed by: EMERGENCY MEDICINE

## 2020-06-16 PROCEDURE — 99285 EMERGENCY DEPT VISIT HI MDM: CPT

## 2020-06-16 PROCEDURE — 93005 ELECTROCARDIOGRAM TRACING: CPT

## 2020-06-16 PROCEDURE — 85025 COMPLETE CBC W/AUTO DIFF WBC: CPT | Performed by: EMERGENCY MEDICINE

## 2020-06-16 PROCEDURE — 85610 PROTHROMBIN TIME: CPT | Performed by: EMERGENCY MEDICINE

## 2020-06-16 PROCEDURE — 84484 ASSAY OF TROPONIN QUANT: CPT | Performed by: EMERGENCY MEDICINE

## 2020-06-16 RX ORDER — BUDESONIDE AND FORMOTEROL FUMARATE DIHYDRATE 160; 4.5 UG/1; UG/1
2 AEROSOL RESPIRATORY (INHALATION) 2 TIMES DAILY
Status: DISCONTINUED | OUTPATIENT
Start: 2020-06-16 | End: 2020-06-17 | Stop reason: HOSPADM

## 2020-06-16 RX ORDER — ASPIRIN 325 MG
325 TABLET ORAL ONCE
Status: COMPLETED | OUTPATIENT
Start: 2020-06-16 | End: 2020-06-16

## 2020-06-16 RX ORDER — ACETAMINOPHEN 325 MG/1
650 TABLET ORAL EVERY 6 HOURS PRN
Status: DISCONTINUED | OUTPATIENT
Start: 2020-06-16 | End: 2020-06-17 | Stop reason: HOSPADM

## 2020-06-16 RX ORDER — COVID-19 ANTIGEN TEST
220 KIT MISCELLANEOUS
COMMUNITY
End: 2020-06-17 | Stop reason: HOSPADM

## 2020-06-16 RX ORDER — ALBUTEROL SULFATE 90 UG/1
2 AEROSOL, METERED RESPIRATORY (INHALATION) EVERY 6 HOURS PRN
Status: DISCONTINUED | OUTPATIENT
Start: 2020-06-16 | End: 2020-06-17 | Stop reason: HOSPADM

## 2020-06-16 RX ORDER — MECLIZINE HCL 12.5 MG/1
12.5 TABLET ORAL EVERY 8 HOURS PRN
Status: DISCONTINUED | OUTPATIENT
Start: 2020-06-16 | End: 2020-06-17 | Stop reason: HOSPADM

## 2020-06-16 RX ORDER — ACETAMINOPHEN 325 MG/1
650 TABLET ORAL EVERY 4 HOURS PRN
Status: DISCONTINUED | OUTPATIENT
Start: 2020-06-16 | End: 2020-06-17 | Stop reason: HOSPADM

## 2020-06-16 RX ORDER — SODIUM CHLORIDE 9 MG/ML
75 INJECTION, SOLUTION INTRAVENOUS CONTINUOUS
Status: DISCONTINUED | OUTPATIENT
Start: 2020-06-16 | End: 2020-06-17 | Stop reason: HOSPADM

## 2020-06-16 RX ORDER — ATORVASTATIN CALCIUM 40 MG/1
40 TABLET, FILM COATED ORAL EVERY EVENING
Status: DISCONTINUED | OUTPATIENT
Start: 2020-06-16 | End: 2020-06-17 | Stop reason: HOSPADM

## 2020-06-16 RX ORDER — NICOTINE 21 MG/24HR
1 PATCH, TRANSDERMAL 24 HOURS TRANSDERMAL DAILY
Status: DISCONTINUED | OUTPATIENT
Start: 2020-06-17 | End: 2020-06-17 | Stop reason: HOSPADM

## 2020-06-16 RX ORDER — MECLIZINE HCL 12.5 MG/1
25 TABLET ORAL ONCE
Status: COMPLETED | OUTPATIENT
Start: 2020-06-16 | End: 2020-06-16

## 2020-06-16 RX ORDER — PANTOPRAZOLE SODIUM 40 MG/1
40 TABLET, DELAYED RELEASE ORAL
Status: DISCONTINUED | OUTPATIENT
Start: 2020-06-17 | End: 2020-06-17 | Stop reason: HOSPADM

## 2020-06-16 RX ADMIN — MECLIZINE 25 MG: 12.5 TABLET ORAL at 15:31

## 2020-06-16 RX ADMIN — ASPIRIN 325 MG ORAL TABLET 325 MG: 325 PILL ORAL at 18:58

## 2020-06-16 RX ADMIN — SODIUM CHLORIDE 75 ML/HR: 0.9 INJECTION, SOLUTION INTRAVENOUS at 18:55

## 2020-06-16 RX ADMIN — ATORVASTATIN CALCIUM 40 MG: 40 TABLET, FILM COATED ORAL at 18:58

## 2020-06-16 RX ADMIN — IOHEXOL 100 ML: 350 INJECTION, SOLUTION INTRAVENOUS at 16:22

## 2020-06-17 ENCOUNTER — TRANSITIONAL CARE MANAGEMENT (OUTPATIENT)
Dept: FAMILY MEDICINE CLINIC | Facility: HOSPITAL | Age: 60
End: 2020-06-17

## 2020-06-17 VITALS
OXYGEN SATURATION: 95 % | WEIGHT: 184.53 LBS | HEIGHT: 70 IN | HEART RATE: 80 BPM | TEMPERATURE: 98.7 F | SYSTOLIC BLOOD PRESSURE: 152 MMHG | BODY MASS INDEX: 26.42 KG/M2 | DIASTOLIC BLOOD PRESSURE: 89 MMHG | RESPIRATION RATE: 18 BRPM

## 2020-06-17 LAB
CHOLEST SERPL-MCNC: 117 MG/DL (ref 50–200)
HDLC SERPL-MCNC: 39 MG/DL
LDLC SERPL CALC-MCNC: 63 MG/DL (ref 0–100)
PLATELET # BLD AUTO: 211 THOUSANDS/UL (ref 149–390)
PMV BLD AUTO: 11.4 FL (ref 8.9–12.7)
TRIGL SERPL-MCNC: 74 MG/DL

## 2020-06-17 PROCEDURE — 99217 PR OBSERVATION CARE DISCHARGE MANAGEMENT: CPT | Performed by: INTERNAL MEDICINE

## 2020-06-17 PROCEDURE — 80061 LIPID PANEL: CPT | Performed by: INTERNAL MEDICINE

## 2020-06-17 PROCEDURE — 85049 AUTOMATED PLATELET COUNT: CPT | Performed by: INTERNAL MEDICINE

## 2020-06-17 PROCEDURE — 99245 OFF/OP CONSLTJ NEW/EST HI 55: CPT | Performed by: PHYSICIAN ASSISTANT

## 2020-06-17 RX ORDER — MECLIZINE HCL 12.5 MG/1
12.5 TABLET ORAL EVERY 8 HOURS PRN
Qty: 9 TABLET | Refills: 0 | Status: SHIPPED | OUTPATIENT
Start: 2020-06-17 | End: 2021-04-14 | Stop reason: ALTCHOICE

## 2020-06-17 RX ADMIN — PANTOPRAZOLE SODIUM 40 MG: 40 TABLET, DELAYED RELEASE ORAL at 05:28

## 2020-06-17 RX ADMIN — ENOXAPARIN SODIUM 40 MG: 40 INJECTION SUBCUTANEOUS at 08:05

## 2020-06-19 ENCOUNTER — OFFICE VISIT (OUTPATIENT)
Dept: FAMILY MEDICINE CLINIC | Facility: HOSPITAL | Age: 60
End: 2020-06-19
Payer: COMMERCIAL

## 2020-06-19 VITALS
HEART RATE: 96 BPM | HEIGHT: 70 IN | SYSTOLIC BLOOD PRESSURE: 138 MMHG | DIASTOLIC BLOOD PRESSURE: 82 MMHG | TEMPERATURE: 97.9 F | WEIGHT: 183 LBS | BODY MASS INDEX: 26.2 KG/M2

## 2020-06-19 DIAGNOSIS — J44.9 SEVERE CHRONIC OBSTRUCTIVE PULMONARY DISEASE (HCC): ICD-10-CM

## 2020-06-19 DIAGNOSIS — H81.10 BENIGN PAROXYSMAL POSITIONAL VERTIGO, UNSPECIFIED LATERALITY: Primary | ICD-10-CM

## 2020-06-19 DIAGNOSIS — F17.210 CIGARETTE NICOTINE DEPENDENCE WITHOUT COMPLICATION: ICD-10-CM

## 2020-06-19 DIAGNOSIS — M48.062 SPINAL STENOSIS OF LUMBAR REGION WITH NEUROGENIC CLAUDICATION: ICD-10-CM

## 2020-06-19 DIAGNOSIS — R03.0 ELEVATED BLOOD PRESSURE READING: ICD-10-CM

## 2020-06-19 PROCEDURE — 99495 TRANSJ CARE MGMT MOD F2F 14D: CPT | Performed by: FAMILY MEDICINE

## 2020-06-19 PROCEDURE — 1111F DSCHRG MED/CURRENT MED MERGE: CPT | Performed by: FAMILY MEDICINE

## 2020-06-19 RX ORDER — SCOLOPAMINE TRANSDERMAL SYSTEM 1 MG/1
1 PATCH, EXTENDED RELEASE TRANSDERMAL
Qty: 4 PATCH | Refills: 1 | Status: SHIPPED | OUTPATIENT
Start: 2020-06-19 | End: 2021-04-14 | Stop reason: ALTCHOICE

## 2020-06-24 ENCOUNTER — EVALUATION (OUTPATIENT)
Dept: PHYSICAL THERAPY | Facility: CLINIC | Age: 60
End: 2020-06-24
Payer: COMMERCIAL

## 2020-06-24 DIAGNOSIS — H81.10 BENIGN PAROXYSMAL POSITIONAL VERTIGO, UNSPECIFIED LATERALITY: ICD-10-CM

## 2020-06-24 PROCEDURE — 97162 PT EVAL MOD COMPLEX 30 MIN: CPT | Performed by: PHYSICAL THERAPIST

## 2020-06-24 PROCEDURE — 97140 MANUAL THERAPY 1/> REGIONS: CPT | Performed by: PHYSICAL THERAPIST

## 2020-06-30 ENCOUNTER — APPOINTMENT (OUTPATIENT)
Dept: PHYSICAL THERAPY | Facility: CLINIC | Age: 60
End: 2020-06-30
Payer: COMMERCIAL

## 2020-10-13 ENCOUNTER — OFFICE VISIT (OUTPATIENT)
Dept: FAMILY MEDICINE CLINIC | Facility: HOSPITAL | Age: 60
End: 2020-10-13
Payer: COMMERCIAL

## 2020-10-13 VITALS
DIASTOLIC BLOOD PRESSURE: 84 MMHG | SYSTOLIC BLOOD PRESSURE: 138 MMHG | TEMPERATURE: 97.8 F | HEART RATE: 100 BPM | BODY MASS INDEX: 26.2 KG/M2 | HEIGHT: 70 IN | WEIGHT: 183 LBS

## 2020-10-13 DIAGNOSIS — E66.3 OVERWEIGHT (BMI 25.0-29.9): ICD-10-CM

## 2020-10-13 DIAGNOSIS — Z23 ENCOUNTER FOR IMMUNIZATION: ICD-10-CM

## 2020-10-13 DIAGNOSIS — J44.9 SEVERE CHRONIC OBSTRUCTIVE PULMONARY DISEASE (HCC): ICD-10-CM

## 2020-10-13 DIAGNOSIS — F17.210 CIGARETTE NICOTINE DEPENDENCE WITHOUT COMPLICATION: ICD-10-CM

## 2020-10-13 DIAGNOSIS — M48.062 SPINAL STENOSIS OF LUMBAR REGION WITH NEUROGENIC CLAUDICATION: ICD-10-CM

## 2020-10-13 DIAGNOSIS — I10 ESSENTIAL HYPERTENSION: Primary | ICD-10-CM

## 2020-10-13 PROCEDURE — 99214 OFFICE O/P EST MOD 30 MIN: CPT | Performed by: FAMILY MEDICINE

## 2020-10-13 PROCEDURE — 3725F SCREEN DEPRESSION PERFORMED: CPT | Performed by: FAMILY MEDICINE

## 2020-10-13 PROCEDURE — 90471 IMMUNIZATION ADMIN: CPT

## 2020-10-13 PROCEDURE — 3075F SYST BP GE 130 - 139MM HG: CPT | Performed by: FAMILY MEDICINE

## 2020-10-13 PROCEDURE — 3079F DIAST BP 80-89 MM HG: CPT | Performed by: FAMILY MEDICINE

## 2020-10-13 PROCEDURE — 90682 RIV4 VACC RECOMBINANT DNA IM: CPT

## 2020-10-13 RX ORDER — ALBUTEROL SULFATE 90 UG/1
2 AEROSOL, METERED RESPIRATORY (INHALATION) EVERY 4 HOURS PRN
Qty: 1 INHALER | Refills: 3 | Status: SHIPPED | OUTPATIENT
Start: 2020-10-13 | End: 2022-06-07 | Stop reason: SDUPTHER

## 2021-04-09 DIAGNOSIS — Z23 ENCOUNTER FOR IMMUNIZATION: ICD-10-CM

## 2021-04-14 ENCOUNTER — OFFICE VISIT (OUTPATIENT)
Dept: FAMILY MEDICINE CLINIC | Facility: HOSPITAL | Age: 61
End: 2021-04-14
Payer: COMMERCIAL

## 2021-04-14 VITALS
SYSTOLIC BLOOD PRESSURE: 134 MMHG | OXYGEN SATURATION: 96 % | BODY MASS INDEX: 25.11 KG/M2 | DIASTOLIC BLOOD PRESSURE: 82 MMHG | HEIGHT: 70 IN | TEMPERATURE: 97 F | HEART RATE: 102 BPM | WEIGHT: 175.4 LBS

## 2021-04-14 DIAGNOSIS — E66.3 OVERWEIGHT (BMI 25.0-29.9): ICD-10-CM

## 2021-04-14 DIAGNOSIS — F17.209 NICOTINE DEPENDENCE WITH NICOTINE-INDUCED DISORDER, UNSPECIFIED NICOTINE PRODUCT TYPE: ICD-10-CM

## 2021-04-14 DIAGNOSIS — J44.9 SEVERE CHRONIC OBSTRUCTIVE PULMONARY DISEASE (HCC): Primary | ICD-10-CM

## 2021-04-14 DIAGNOSIS — J44.9 CHRONIC OBSTRUCTIVE PULMONARY DISEASE, UNSPECIFIED COPD TYPE (HCC): ICD-10-CM

## 2021-04-14 DIAGNOSIS — M47.27 LUMBOSACRAL RADICULOPATHY DUE TO DEGENERATIVE JOINT DISEASE OF SPINE: ICD-10-CM

## 2021-04-14 DIAGNOSIS — I10 ESSENTIAL HYPERTENSION: ICD-10-CM

## 2021-04-14 DIAGNOSIS — H81.10 BENIGN PAROXYSMAL POSITIONAL VERTIGO, UNSPECIFIED LATERALITY: ICD-10-CM

## 2021-04-14 PROCEDURE — 3075F SYST BP GE 130 - 139MM HG: CPT | Performed by: FAMILY MEDICINE

## 2021-04-14 PROCEDURE — 3008F BODY MASS INDEX DOCD: CPT | Performed by: FAMILY MEDICINE

## 2021-04-14 PROCEDURE — 3079F DIAST BP 80-89 MM HG: CPT | Performed by: FAMILY MEDICINE

## 2021-04-14 PROCEDURE — 99213 OFFICE O/P EST LOW 20 MIN: CPT | Performed by: FAMILY MEDICINE

## 2021-04-14 RX ORDER — UMECLIDINIUM 62.5 UG/1
1 AEROSOL, POWDER ORAL DAILY
Qty: 90 EACH | Refills: 4 | Status: SHIPPED | OUTPATIENT
Start: 2021-04-14 | End: 2022-06-07 | Stop reason: SDUPTHER

## 2021-04-14 RX ORDER — UMECLIDINIUM 62.5 UG/1
1 AEROSOL, POWDER ORAL DAILY
Qty: 90 EACH | Refills: 4 | Status: SHIPPED | OUTPATIENT
Start: 2021-04-14 | End: 2021-04-14 | Stop reason: SDUPTHER

## 2021-04-14 NOTE — PROGRESS NOTES
Assessment/Plan:         Diagnoses and all orders for this visit:    Severe chronic obstructive pulmonary disease ([facility] )  Comments:  Running out of Incruse- sample Anoro given along with renew of Incruse    Lumbosacral radiculopathy due to degenerative joint disease of spine  Comments:  No current option for treatment at this time    Benign paroxysmal positional vertigo, unspecified laterality    Essential hypertension  Comments:  Stable without need for medication    Overweight (BMI 25 0-29  9)    Nicotine dependence with nicotine-induced disorder, unspecified nicotine product type  Comments:  Encouraged cessation today    Chronic obstructive pulmonary disease, unspecified COPD type ([facility] )  -     Discontinue: umeclidinium bromide (Incruse Ellipta) 62 5 mcg/inh AEPB inhaler; Inhale 1 puff daily  -     umeclidinium bromide (Incruse Ellipta) 62 5 mcg/inh AEPB inhaler; Inhale 1 puff daily          Subjective:      Patient ID: Mari Corea is a 59 y o  male  6 month follow up    All his issues have gotten worse  Only goes from back of house to front of house  He blames his back surgeon for progressive limitation and has bills related to the "experimental "  nature of it    Able to sleep but not normal pattern    On schedule for COVID vaccine    Doing inhalers with some benefit    Down to 1/2 PPD and plans to quit today  The following portions of the patient's history were reviewed and updated as appropriate: allergies, current medications, past family history, past medical history, past social history, past surgical history and problem list     Review of Systems   Constitutional: Negative for unexpected weight change  HENT: Negative  Respiratory: Positive for cough, chest tightness, shortness of breath and wheezing  Cardiovascular: Negative for chest pain, palpitations and leg swelling  Genitourinary: Negative  Musculoskeletal: Positive for arthralgias, back pain, gait problem and myalgias  Psychiatric/Behavioral: Positive for dysphoric mood and sleep disturbance  All other systems reviewed and are negative  Objective:      /82 (BP Location: Left arm, Patient Position: Sitting, Cuff Size: Standard)   Pulse 102   Temp (!) 97 °F (36 1 °C) (Tympanic)   Ht 5' 10" (1 778 m)   Wt 79 6 kg (175 lb 6 4 oz)   SpO2 96%   BMI 25 17 kg/m²          Physical Exam  Vitals signs and nursing note reviewed  Constitutional:       Appearance: Normal appearance  Cardiovascular:      Rate and Rhythm: Normal rate and regular rhythm  Pulses: Normal pulses  Heart sounds: Normal heart sounds  Pulmonary:      Effort: No respiratory distress  Breath sounds: Decreased air movement present  Decreased breath sounds present  Musculoskeletal:      Right lower leg: No edema  Left lower leg: No edema  Neurological:      General: No focal deficit present  Mental Status: He is alert and oriented to person, place, and time  Psychiatric:         Mood and Affect: Mood normal  Affect is blunt  Speech: Speech normal          Behavior: Behavior normal          Thought Content: Thought content normal          Judgment: Judgment normal        BMI Counseling: Body mass index is 25 17 kg/m²  The BMI is above normal  Nutrition recommendations include reducing portion sizes, decreasing overall calorie intake and moderation in carbohydrate intake  Exercise recommendations include moderate aerobic physical activity for 150 minutes/week and strength training exercises

## 2021-04-23 ENCOUNTER — IMMUNIZATIONS (OUTPATIENT)
Dept: FAMILY MEDICINE CLINIC | Facility: HOSPITAL | Age: 61
End: 2021-04-23

## 2021-04-23 DIAGNOSIS — Z23 ENCOUNTER FOR IMMUNIZATION: Primary | ICD-10-CM

## 2021-04-23 PROCEDURE — 0001A SARS-COV-2 / COVID-19 MRNA VACCINE (PFIZER-BIONTECH) 30 MCG: CPT

## 2021-04-23 PROCEDURE — 91300 SARS-COV-2 / COVID-19 MRNA VACCINE (PFIZER-BIONTECH) 30 MCG: CPT

## 2021-05-18 ENCOUNTER — IMMUNIZATIONS (OUTPATIENT)
Dept: FAMILY MEDICINE CLINIC | Facility: HOSPITAL | Age: 61
End: 2021-05-18

## 2021-05-18 DIAGNOSIS — Z23 ENCOUNTER FOR IMMUNIZATION: Primary | ICD-10-CM

## 2021-05-18 PROCEDURE — 0002A SARS-COV-2 / COVID-19 MRNA VACCINE (PFIZER-BIONTECH) 30 MCG: CPT

## 2021-05-18 PROCEDURE — 91300 SARS-COV-2 / COVID-19 MRNA VACCINE (PFIZER-BIONTECH) 30 MCG: CPT

## 2021-05-21 DIAGNOSIS — J44.9 CHRONIC OBSTRUCTIVE PULMONARY DISEASE, UNSPECIFIED COPD TYPE (HCC): ICD-10-CM

## 2021-05-21 RX ORDER — BUDESONIDE AND FORMOTEROL FUMARATE DIHYDRATE 160; 4.5 UG/1; UG/1
AEROSOL RESPIRATORY (INHALATION)
Qty: 30.6 G | Refills: 3 | Status: SHIPPED | OUTPATIENT
Start: 2021-05-21 | End: 2022-06-07 | Stop reason: SDUPTHER

## 2021-06-08 ENCOUNTER — TELEPHONE (OUTPATIENT)
Dept: FAMILY MEDICINE CLINIC | Facility: HOSPITAL | Age: 61
End: 2021-06-08

## 2021-06-08 NOTE — TELEPHONE ENCOUNTER
Patient called asking to get a permanent  handicap placard      [facility] patient can be reached at [phone]

## 2021-10-18 ENCOUNTER — OFFICE VISIT (OUTPATIENT)
Dept: FAMILY MEDICINE CLINIC | Facility: HOSPITAL | Age: 61
End: 2021-10-18
Payer: COMMERCIAL

## 2021-10-18 VITALS
BODY MASS INDEX: 23.91 KG/M2 | SYSTOLIC BLOOD PRESSURE: 128 MMHG | TEMPERATURE: 96.7 F | HEIGHT: 70 IN | WEIGHT: 167 LBS | DIASTOLIC BLOOD PRESSURE: 84 MMHG | HEART RATE: 85 BPM

## 2021-10-18 DIAGNOSIS — I10 ESSENTIAL HYPERTENSION: Primary | ICD-10-CM

## 2021-10-18 DIAGNOSIS — J44.9 SEVERE CHRONIC OBSTRUCTIVE PULMONARY DISEASE (HCC): ICD-10-CM

## 2021-10-18 DIAGNOSIS — G89.4 PAIN SYNDROME, CHRONIC: ICD-10-CM

## 2021-10-18 DIAGNOSIS — Z23 ENCOUNTER FOR IMMUNIZATION: ICD-10-CM

## 2021-10-18 DIAGNOSIS — F17.210 CIGARETTE NICOTINE DEPENDENCE WITHOUT COMPLICATION: ICD-10-CM

## 2021-10-18 PROCEDURE — 90682 RIV4 VACC RECOMBINANT DNA IM: CPT | Performed by: FAMILY MEDICINE

## 2021-10-18 PROCEDURE — 3725F SCREEN DEPRESSION PERFORMED: CPT | Performed by: FAMILY MEDICINE

## 2021-10-18 PROCEDURE — 3008F BODY MASS INDEX DOCD: CPT | Performed by: FAMILY MEDICINE

## 2021-10-18 PROCEDURE — 99213 OFFICE O/P EST LOW 20 MIN: CPT | Performed by: FAMILY MEDICINE

## 2021-10-18 PROCEDURE — 90471 IMMUNIZATION ADMIN: CPT | Performed by: FAMILY MEDICINE

## 2022-04-20 ENCOUNTER — OFFICE VISIT (OUTPATIENT)
Dept: FAMILY MEDICINE CLINIC | Facility: HOSPITAL | Age: 62
End: 2022-04-20
Payer: MEDICARE

## 2022-04-20 VITALS
TEMPERATURE: 96.6 F | WEIGHT: 167.4 LBS | HEIGHT: 70 IN | DIASTOLIC BLOOD PRESSURE: 80 MMHG | BODY MASS INDEX: 23.96 KG/M2 | HEART RATE: 83 BPM | SYSTOLIC BLOOD PRESSURE: 150 MMHG

## 2022-04-20 DIAGNOSIS — I10 ESSENTIAL HYPERTENSION: ICD-10-CM

## 2022-04-20 DIAGNOSIS — F17.219 CIGARETTE NICOTINE DEPENDENCE WITH NICOTINE-INDUCED DISORDER: ICD-10-CM

## 2022-04-20 DIAGNOSIS — J44.9 SEVERE CHRONIC OBSTRUCTIVE PULMONARY DISEASE (HCC): Primary | ICD-10-CM

## 2022-04-20 DIAGNOSIS — M47.27 LUMBOSACRAL RADICULOPATHY DUE TO DEGENERATIVE JOINT DISEASE OF SPINE: ICD-10-CM

## 2022-04-20 PROCEDURE — 99214 OFFICE O/P EST MOD 30 MIN: CPT | Performed by: FAMILY MEDICINE

## 2022-04-20 NOTE — PROGRESS NOTES
Assessment/Plan:         Diagnoses and all orders for this visit:    Severe chronic obstructive pulmonary disease (HCC)  Comments:  Significant limitations from COPD, may agree to Pulmonary referral after back is addressed    Essential hypertension  Comments:  Adequate BP control    Lumbosacral radiculopathy due to degenerative joint disease of spine  Comments:  Limitations S/P surgery  Refer to [facility]    Cigarette nicotine dependence with nicotine-induced disorder  Comments:  Urged D/C cigarettes          Subjective:      Patient ID: Shai Ramsey is a 57 y o  male  6 month follow up  No recent illness or injury    Cough is getting worse- lots of phlegm and "tissue"  Had green sputum about 2 weeks ago  Using Symbicort and Incruse and PRN use of Proair    Back pain limits his ability to walk any distance      The following portions of the patient's history were reviewed and updated as appropriate: allergies, current medications, past family history, past medical history, past social history, past surgical history and problem list     Review of Systems   Constitutional: Positive for fatigue  Negative for unexpected weight change  Respiratory: Positive for cough, shortness of breath and wheezing  Cardiovascular: Negative  Musculoskeletal: Positive for arthralgias, back pain, gait problem and myalgias  Psychiatric/Behavioral: Positive for dysphoric mood  All other systems reviewed and are negative  Objective:      /80   Pulse 83   Temp (!) 96 6 °F (35 9 °C)   Ht 5' 10" (1 778 m)   Wt 75 9 kg (167 lb 6 4 oz)   BMI 24 02 kg/m²          Physical Exam  Vitals and nursing note reviewed  Constitutional:       Appearance: He is ill-appearing  HENT:      Head: Normocephalic  Neck:      Vascular: No carotid bruit  Cardiovascular:      Pulses: Normal pulses  Heart sounds: Normal heart sounds     Pulmonary:      Breath sounds: Wheezing, rhonchi and rales present  Musculoskeletal:      Right lower leg: No edema  Left lower leg: No edema  Skin:     Findings: No rash  Neurological:      Mental Status: He is alert and oriented to person, place, and time  Psychiatric:         Attention and Perception: Attention normal          Mood and Affect: Affect is labile  Speech: Speech normal          Behavior: Behavior is agitated           Cognition and Memory: Cognition normal          Judgment: Judgment normal

## 2022-06-07 DIAGNOSIS — J44.9 CHRONIC OBSTRUCTIVE PULMONARY DISEASE, UNSPECIFIED COPD TYPE (HCC): ICD-10-CM

## 2022-06-07 DIAGNOSIS — J44.9 SEVERE CHRONIC OBSTRUCTIVE PULMONARY DISEASE (HCC): ICD-10-CM

## 2022-06-07 RX ORDER — ALBUTEROL SULFATE 90 UG/1
2 AEROSOL, METERED RESPIRATORY (INHALATION) EVERY 4 HOURS PRN
Qty: 8.5 G | Refills: 3 | Status: SHIPPED | OUTPATIENT
Start: 2022-06-07

## 2022-06-07 RX ORDER — UMECLIDINIUM 62.5 UG/1
1 AEROSOL, POWDER ORAL DAILY
Qty: 90 BLISTER | Refills: 4 | Status: SHIPPED | OUTPATIENT
Start: 2022-06-07

## 2022-06-07 RX ORDER — BUDESONIDE AND FORMOTEROL FUMARATE DIHYDRATE 160; 4.5 UG/1; UG/1
2 AEROSOL RESPIRATORY (INHALATION) 2 TIMES DAILY
Qty: 30.6 G | Refills: 3 | Status: SHIPPED | OUTPATIENT
Start: 2022-06-07

## 2022-10-21 ENCOUNTER — OFFICE VISIT (OUTPATIENT)
Dept: FAMILY MEDICINE CLINIC | Facility: HOSPITAL | Age: 62
End: 2022-10-21
Payer: MEDICARE

## 2022-10-21 VITALS
TEMPERATURE: 97.1 F | BODY MASS INDEX: 23.11 KG/M2 | OXYGEN SATURATION: 97 % | DIASTOLIC BLOOD PRESSURE: 86 MMHG | HEIGHT: 70 IN | WEIGHT: 161.4 LBS | HEART RATE: 90 BPM | SYSTOLIC BLOOD PRESSURE: 148 MMHG

## 2022-10-21 DIAGNOSIS — F33.9 DEPRESSION, RECURRENT (HCC): ICD-10-CM

## 2022-10-21 DIAGNOSIS — J44.9 SEVERE CHRONIC OBSTRUCTIVE PULMONARY DISEASE (HCC): Primary | ICD-10-CM

## 2022-10-21 DIAGNOSIS — M54.40 CHRONIC BILATERAL LOW BACK PAIN WITH SCIATICA, SCIATICA LATERALITY UNSPECIFIED: ICD-10-CM

## 2022-10-21 DIAGNOSIS — Z23 ENCOUNTER FOR IMMUNIZATION: ICD-10-CM

## 2022-10-21 DIAGNOSIS — G89.29 CHRONIC BILATERAL LOW BACK PAIN WITH SCIATICA, SCIATICA LATERALITY UNSPECIFIED: ICD-10-CM

## 2022-10-21 DIAGNOSIS — M47.27 LUMBOSACRAL RADICULOPATHY DUE TO DEGENERATIVE JOINT DISEASE OF SPINE: ICD-10-CM

## 2022-10-21 DIAGNOSIS — F17.200 NICOTINE DEPENDENCE, UNCOMPLICATED, UNSPECIFIED NICOTINE PRODUCT TYPE: ICD-10-CM

## 2022-10-21 DIAGNOSIS — Z00.00 MEDICARE ANNUAL WELLNESS VISIT, INITIAL: ICD-10-CM

## 2022-10-21 PROCEDURE — G0402 INITIAL PREVENTIVE EXAM: HCPCS

## 2022-10-21 PROCEDURE — 99214 OFFICE O/P EST MOD 30 MIN: CPT | Performed by: FAMILY MEDICINE

## 2022-10-21 PROCEDURE — 90682 RIV4 VACC RECOMBINANT DNA IM: CPT

## 2022-10-21 PROCEDURE — G0008 ADMIN INFLUENZA VIRUS VAC: HCPCS

## 2022-10-21 NOTE — PROGRESS NOTES
Assessment and Plan:     Problem List Items Addressed This Visit        Respiratory    Severe chronic obstructive pulmonary disease ([address] ) - Primary       Nervous and Auditory    Lumbosacral radiculopathy due to degenerative joint disease of spine       Other    Chronic bilateral low back pain    Nicotine dependence    Relevant Medications    varenicline (CHANTIX KEITH) 0 5 MG X 11 & 1 MG X 42 tablet    BMI 23 0-23 9, adult    Medicare annual wellness visit, initial    Depression, recurrent (HCC)    Relevant Medications    varenicline (CHANTIX KEITH) 0 5 MG X 11 & 1 MG X 42 tablet      Other Visit Diagnoses     Encounter for immunization        Relevant Orders    influenza vaccine, quadrivalent, recombinant, PF, 0 5 mL, for patients 18 yr+ (FLUBLOK) (Completed)           Preventive health issues were discussed with patient, and age appropriate screening tests were ordered as noted in patient's After Visit Summary  Personalized health advice and appropriate referrals for health education or preventive services given if needed, as noted in patient's After Visit Summary  History of Present Illness:     Patient presents for a Medicare Wellness Visit    Pt was verbally abusive to my staff person in her questioning for the Medicare Wellness visit  She was unable to complete full questioning because of this    He didn't follow through on my last visit recommendation to see [facility]  He believes he had an anxiety attack when leaving from our last visit 6 months ago    Using Albuterol more often    Willing to get COVID booster and get flu vaccine today     Patient Care Team:  Librado Easton MD as PCP - General  DO Ana Yan CRNP Daryl Stanton Minder, MD Marshall Harvest, CRNP     Review of Systems:     Review of Systems   Constitutional: Positive for fatigue  Negative for unexpected weight change  HENT: Negative  Eyes: Negative for visual disturbance     Respiratory: Positive for shortness of breath and wheezing  Cardiovascular: Negative  Gastrointestinal: Negative  Genitourinary: Negative  Musculoskeletal: Positive for arthralgias, back pain, gait problem, joint swelling, myalgias, neck pain and neck stiffness  Psychiatric/Behavioral: Negative  All other systems reviewed and are negative         Problem List:     Patient Active Problem List   Diagnosis   • Cervical spondylolysis   • Cervical stenosis of spine   • Chronic bilateral low back pain   • Chronic cervical radiculopathy   • Elevated blood pressure reading   • Nicotine dependence   • Osteoarthritis of spine with radiculopathy, lumbar region   • Pain syndrome, chronic   • Seasonal allergies   • Severe chronic obstructive pulmonary disease (HCC)   • Elevated serum creatinine   • BMI 23 0-23 9, adult   • Lumbosacral radiculopathy due to degenerative joint disease of spine   • Sciatica of right side   • Medicare annual wellness visit, initial   • Herniation of intervertebral disc between L5 and S1   • Spinal stenosis of lumbar region with neurogenic claudication   • Benign paroxysmal positional vertigo   • Essential hypertension   • Other chest pain   • Depression, recurrent ([facility])      Past Medical and Surgical History:     Past Medical History:   Diagnosis Date   • Abnormal weight loss     Last Assessed: 5/19/2017   • Alcohol abuse    • Arthritis    • Asthma    • COPD (chronic obstructive pulmonary disease) ([facility] )    • Mental health problem    • Scabies     Last Assessed: 11/13/2014   • Substance abuse ([facility] )      Past Surgical History:   Procedure Laterality Date   • BACK SURGERY     • LUMBAR FUSION        Family History:     Family History   Problem Relation Age of Onset   • Prostate cancer Brother    • [place]'s disease Family         chorea    • Heart attack Family    • No Known Problems Mother       Social History:     Social History     Socioeconomic History   • Marital status: /Civil Union Spouse name: None   • Number of children: 2   • Years of education: highschool or [employer]    • Highest education level: None   Occupational History   • Occupation:     Tobacco Use   • Smoking status: Current Every Day Smoker     Packs/day: 1 00     Types: Cigarettes     Start date: 1969   • Smokeless tobacco: Never Used   Vaping Use   • Vaping Use: Never used   Substance and Sexual Activity   • Alcohol use: Not Currently   • Drug use: Yes     Types: Marijuana     Comment: medical card   • Sexual activity: None   Other Topics Concern   • None   Social History Narrative    Per [employer]:     Always uses seat belt     Drinks coffee: drinks 4-12 ounce cups a day      Social Determinants of Health     Financial Resource Strain: Not on file   Food Insecurity: Not on file   Transportation Needs: Not on file   Physical Activity: Not on file   Stress: Not on file   Social Connections: Not on file   Intimate Partner Violence: Not on file   Housing Stability: Not on file      Medications and Allergies:     Current Outpatient Medications   Medication Sig Dispense Refill   • albuterol (ProAir HFA) 90 mcg/act inhaler Inhale 2 puffs every 4 (four) hours as needed for wheezing 8 5 g 3   • budesonide-formoterol (Symbicort) 160-4 5 mcg/act inhaler Inhale 2 puffs 2 (two) times a day Rinse mouth after use  30 6 g 3   • umeclidinium bromide (Incruse Ellipta) 62 5 mcg/inh AEPB inhaler Inhale 1 puff daily 90 blister 4   • varenicline (CHANTIX KEITH) 0 5 MG X 11 & 1 MG X 42 tablet Take one 0 5 mg tablet by mouth once daily for 3 days, then one 0 5 mg tablet by mouth twice daily for 4 days, then one 1 mg tablet by mouth twice daily  53 tablet 0     No current facility-administered medications for this visit       No Known Allergies   Immunizations:     Immunization History   Administered Date(s) Administered   • COVID-19 PFIZER VACCINE 0 3 ML IM 04/23/2021, 05/18/2021, 12/30/2021   • Influenza Quadrivalent, 6-35 Months IM 09/30/2016, 12/01/2017   • Influenza, recombinant, quadrivalent,injectable, preservative free 09/28/2018, 10/07/2019, 10/13/2020, 10/18/2021, 10/21/2022   • Pneumococcal Polysaccharide PPV23 12/01/2017   • Tdap 09/30/2016      Health Maintenance:         Topic Date Due   • Hepatitis C Screening  Never done   • HIV Screening  Never done   • Colorectal Cancer Screening  05/27/2026         Topic Date Due   • Hepatitis A Vaccine (1 of 2 - Risk 2-dose series) Never done   • Hepatitis B Vaccine (1 of 3 - Risk 3-dose series) Never done   • Pneumococcal Vaccine: Pediatrics (0 to 5 Years) and At-Risk Patients (6 to 64 Years) (2 - PCV) 12/01/2018   • COVID-19 Vaccine (4 - Booster for Jacobsen Peter series) 04/30/2022      Medicare Screening Tests and Risk Assessments:     Ryan Hobbs is here for his Subsequent Wellness visit  Last Medicare Wellness visit information reviewed, patient interviewed and updates made to the record today  Health Risk Assessment:   Patient rates overall health as poor  Patient feels that their physical health rating is slightly worse       Depression Screening:   PHQ-2 Score: 3  PHQ-9 Score: 14      PREVENTIVE SCREENINGS      Cardiovascular Screening:    General: Screening Current      Diabetes Screening:     General: Risks and Benefits Discussed    Due for: Blood Glucose      Colorectal Cancer Screening:     General: Screening Current      Prostate Cancer Screening:    General: Risks and Benefits Discussed    Due for: PSA      Osteoporosis Screening:    General: Screening Not Indicated      Abdominal Aortic Aneurysm (AAA) Screening:    Risk factors include: tobacco use        General: Screening Not Indicated      Lung Cancer Screening:     General: Risks and Benefits Discussed    Due for: Low Dose CT (LDCT)      Hepatitis C Screening:    General: Risks and Benefits Discussed    Hep C Screening Accepted: No      Visual Acuity Screening    Right eye Left eye Both eyes   Without correction:      With correction: 20/20 20/20 20/20        Physical Exam:     /86   Pulse 90   Temp (!) 97 1 °F (36 2 °C)   Ht 5' 10" (1 778 m)   Wt 73 2 kg (161 lb 6 4 oz)   SpO2 97%   BMI 23 16 kg/m²     Physical Exam  Vitals and nursing note reviewed  Constitutional:       Appearance: He is ill-appearing  Cardiovascular:      Rate and Rhythm: Normal rate and regular rhythm  Heart sounds: Normal heart sounds  Pulmonary:      Effort: No respiratory distress  Breath sounds: Wheezing and rhonchi present  Musculoskeletal:      Right lower leg: No edema  Left lower leg: No edema  Skin:     Findings: No rash     Psychiatric:         Mood and Affect: Mood normal           Kwan Aguilar MD

## 2022-10-21 NOTE — PATIENT INSTRUCTIONS
Medicare Preventive Visit Patient Instructions  Thank you for completing your Welcome to Medicare Visit or Medicare Annual Wellness Visit today  Your next wellness visit will be due in one year (10/22/2023)  The screening/preventive services that you may require over the next 5-10 years are detailed below  Some tests may not apply to you based off risk factors and/or age  Screening tests ordered at today's visit but not completed yet may show as past due  Also, please note that scanned in results may not display below  Preventive Screenings:  Service Recommendations Previous Testing/Comments   Colorectal Cancer Screening  · Colonoscopy    · Fecal Occult Blood Test (FOBT)/Fecal Immunochemical Test (FIT)  · Fecal DNA/Cologuard Test  · Flexible Sigmoidoscopy Age: 39-70 years old   Colonoscopy: every 10 years (May be performed more frequently if at higher risk)  OR  FOBT/FIT: every 1 year  OR  Cologuard: every 3 years  OR  Sigmoidoscopy: every 5 years  Screening may be recommended earlier than age 39 if at higher risk for colorectal cancer  Also, an individualized decision between you and your healthcare provider will decide whether screening between the ages of 74-80 would be appropriate   Colonoscopy: 05/27/2016  FOBT/FIT: Not on file  Cologuard: Not on file  Sigmoidoscopy: Not on file    Screening Current     Prostate Cancer Screening Individualized decision between patient and health care provider in men between ages of 53-78   Medicare will cover every 12 months beginning on the day after your 50th birthday PSA: No results in last 5 years           Hepatitis C Screening Once for adults born between [facility]  More frequently in patients at high risk for Hepatitis C Hep C Antibody: Not on file        Diabetes Screening 1-2 times per year if you're at risk for diabetes or have pre-diabetes Fasting glucose: No results in last 5 years (No results in last 5 years)  A1C: No results in last 5 years (No results in last 5 years)      Cholesterol Screening Once every 5 years if you don't have a lipid disorder  May order more often based on risk factors  Lipid panel: 06/17/2020  Screening Current      Other Preventive Screenings Covered by Medicare:  1  Abdominal Aortic Aneurysm (AAA) Screening: covered once if your at risk  You're considered to be at risk if you have a family history of AAA or a male between the age of 73-68 who smoking at least 100 cigarettes in your lifetime  2  Lung Cancer Screening: covers low dose CT scan once per year if you meet all of the following conditions: (1) Age 50-69; (2) No signs or symptoms of lung cancer; (3) Current smoker or have quit smoking within the last 15 years; (4) You have a tobacco smoking history of at least 20 pack years (packs per day x number of years you smoked); (5) You get a written order from a healthcare provider  3  Glaucoma Screening: covered annually if you're considered high risk: (1) You have diabetes OR (2) Family history of glaucoma OR (3)  aged 48 and older OR (3)  American aged 72 and older  3  Osteoporosis Screening: covered every 2 years if you meet one of the following conditions: (1) Have a vertebral abnormality; (2) On glucocorticoid therapy for more than 3 months; (3) Have primary hyperparathyroidism; (4) On osteoporosis medications and need to assess response to drug therapy  5  HIV Screening: covered annually if you're between the age of 12-76  Also covered annually if you are younger than 13 and older than 72 with risk factors for HIV infection  For pregnant patients, it is covered up to 3 times per pregnancy      Immunizations:  Immunization Recommendations   Influenza Vaccine Annual influenza vaccination during flu season is recommended for all persons aged >= 6 months who do not have contraindications   Pneumococcal Vaccine   * Pneumococcal conjugate vaccine = PCV13 (Prevnar 13), PCV15 (Vaxneuvance), PCV20 (Prevnar 20)  * Pneumococcal polysaccharide vaccine = PPSV23 (Pneumovax) Adults 2364 years old: 1-3 doses may be recommended based on certain risk factors  Adults 72 years old: 1-2 doses may be recommended based off what pneumonia vaccine you previously received   Hepatitis B Vaccine 3 dose series if at intermediate or high risk (ex: diabetes, end stage renal disease, liver disease)   Tetanus (Td) Vaccine - COST NOT COVERED BY MEDICARE PART B Following completion of primary series, a booster dose should be given every 10 years to maintain immunity against tetanus  Td may also be given as tetanus wound prophylaxis  Tdap Vaccine - COST NOT COVERED BY MEDICARE PART B Recommended at least once for all adults  For pregnant patients, recommended with each pregnancy  Shingles Vaccine (Shingrix) - COST NOT COVERED BY MEDICARE PART B  2 shot series recommended in those aged 48 and above     Health Maintenance Due:      Topic Date Due   • Hepatitis C Screening  Never done   • HIV Screening  Never done   • Colorectal Cancer Screening  05/27/2026     Immunizations Due:      Topic Date Due   • Hepatitis A Vaccine (1 of 2 - Risk 2-dose series) Never done   • Hepatitis B Vaccine (1 of 3 - Risk 3-dose series) Never done   • Pneumococcal Vaccine: Pediatrics (0 to 5 Years) and At-Risk Patients (6 to 64 Years) (2 - PCV) 12/01/2018   • COVID-19 Vaccine (4 - Booster for Pfizer series) 04/30/2022   • Influenza Vaccine (1) 09/01/2022     Advance Directives   What are advance directives? Advance directives are legal documents that state your wishes and plans for medical care  These plans are made ahead of time in case you lose your ability to make decisions for yourself  Advance directives can apply to any medical decision, such as the treatments you want, and if you want to donate organs  What are the types of advance directives? There are many types of advance directives, and each state has rules about how to use them   You may choose a combination of any of the following:  · Living will: This is a written record of the treatment you want  You can also choose which treatments you do not want, which to limit, and which to stop at a certain time  This includes surgery, medicine, IV fluid, and tube feedings  · Durable power of  for healthcare [facility]): This is a written record that states who you want to make healthcare choices for you when you are unable to make them for yourself  This person, called a proxy, is usually a family member or a friend  You may choose more than 1 proxy  · Do not resuscitate (DNR) order:  A DNR order is used in case your heart stops beating or you stop breathing  It is a request not to have certain forms of treatment, such as CPR  A DNR order may be included in other types of advance directives  · Medical directive: This covers the care that you want if you are in a coma, near death, or unable to make decisions for yourself  You can list the treatments you want for each condition  Treatment may include pain medicine, surgery, blood transfusions, dialysis, IV or tube feedings, and a ventilator (breathing machine)  · Values history: This document has questions about your views, beliefs, and how you feel and think about life  This information can help others choose the care that you would choose  Why are advance directives important? An advance directive helps you control your care  Although spoken wishes may be used, it is better to have your wishes written down  Spoken wishes can be misunderstood, or not followed  Treatments may be given even if you do not want them  An advance directive may make it easier for your family to make difficult choices about your care  Depression   Depression  is a medical condition that causes feelings of sadness or hopelessness that do not go away  Depression may cause you to lose interest in things you used to enjoy  These feelings may interfere with your daily life    Call your local emergency number (911 in the [address] [place] [address] [address]) if:   · You think about harming yourself or someone else  · You have done something on purpose to hurt yourself  The following resources are available at any time to help you, if needed:   · [address]: 3-293.624.5819 (3-306-073-IPKL)   · Suicide Hotline: 1-811.228.5471 (8-095-CXJJMPY)   · For a list of international numbers: https://save org/find-help/international-resources/  Treatment for depression may include medicine to relieve depression  Medicine is often used together with therapy  Therapy is a way for you to talk about your feelings and anything that may be causing depression  Therapy can be done alone or in a group  It may also be done with family members or a significant other  · Get regular physical activity  · Create a regular sleep schedule  · Eat a variety of healthy foods  · Do not drink alcohol or use drugs  Cigarette Smoking and Your Health   Risks to your health if you smoke:  Nicotine and other chemicals found in tobacco damage every cell in your body  Even if you are a light smoker, you have an increased risk for cancer, heart disease, and lung disease  If you are pregnant or have diabetes, smoking increases your risk for complications  Benefits to your health if you stop smoking:   · You decrease respiratory symptoms such as coughing, wheezing, and shortness of breath  · You reduce your risk for cancers of the lung, mouth, throat, kidney, bladder, pancreas, stomach, and cervix  If you already have cancer, you increase the benefits of chemotherapy  You also reduce your risk for cancer returning or a second cancer from developing  · You reduce your risk for heart disease, blood clots, heart attack, and stroke  · You reduce your risk for lung infections, and diseases such as pneumonia, asthma, chronic bronchitis, and emphysema  · Your circulation improves  More oxygen can be delivered to your body   If you have diabetes, you lower your risk for complications, such as kidney, artery, and eye diseases  You also lower your risk for nerve damage  Nerve damage can lead to amputations, poor vision, and blindness  · You improve your body's ability to heal and to fight infections  For more information and support to stop smoking:   · Smokefree  gov  Phone: 8- 340 - 880-1024  Web Address: [employer]  [facility] 2018 Information is for End User's use only and may not be sold, redistributed or otherwise used for commercial purposes   All illustrations and images included in CareNotes® are the copyrighted property of A D A M , Inc  or [address] [place] [address]

## 2022-10-26 ENCOUNTER — TELEPHONE (OUTPATIENT)
Dept: FAMILY MEDICINE CLINIC | Facility: HOSPITAL | Age: 62
End: 2022-10-26

## 2022-12-01 ENCOUNTER — TELEPHONE (OUTPATIENT)
Dept: FAMILY MEDICINE CLINIC | Facility: HOSPITAL | Age: 62
End: 2022-12-01

## 2022-12-01 DIAGNOSIS — F17.209 NICOTINE DEPENDENCE WITH NICOTINE-INDUCED DISORDER, UNSPECIFIED NICOTINE PRODUCT TYPE: Primary | ICD-10-CM

## 2022-12-01 RX ORDER — VARENICLINE TARTRATE 1 MG/1
1 TABLET, FILM COATED ORAL 2 TIMES DAILY
Qty: 60 TABLET | Refills: 2 | Status: SHIPPED | OUTPATIENT
Start: 2022-12-01

## 2022-12-20 PROBLEM — Z00.00 MEDICARE ANNUAL WELLNESS VISIT, INITIAL: Status: RESOLVED | Noted: 2019-10-08 | Resolved: 2022-12-20

## 2023-04-19 ENCOUNTER — OFFICE VISIT (OUTPATIENT)
Dept: FAMILY MEDICINE CLINIC | Facility: HOSPITAL | Age: 63
End: 2023-04-19

## 2023-04-19 VITALS
BODY MASS INDEX: 23.25 KG/M2 | WEIGHT: 162.4 LBS | HEART RATE: 94 BPM | OXYGEN SATURATION: 98 % | SYSTOLIC BLOOD PRESSURE: 148 MMHG | TEMPERATURE: 97.5 F | DIASTOLIC BLOOD PRESSURE: 80 MMHG | HEIGHT: 70 IN

## 2023-04-19 DIAGNOSIS — Z12.5 SCREENING FOR MALIGNANT NEOPLASM OF PROSTATE: ICD-10-CM

## 2023-04-19 DIAGNOSIS — Z11.59 ENCOUNTER FOR HEPATITIS C SCREENING TEST FOR LOW RISK PATIENT: ICD-10-CM

## 2023-04-19 DIAGNOSIS — E78.00 PURE HYPERCHOLESTEROLEMIA: ICD-10-CM

## 2023-04-19 DIAGNOSIS — M48.062 SPINAL STENOSIS OF LUMBAR REGION WITH NEUROGENIC CLAUDICATION: ICD-10-CM

## 2023-04-19 DIAGNOSIS — F17.209 NICOTINE DEPENDENCE WITH NICOTINE-INDUCED DISORDER, UNSPECIFIED NICOTINE PRODUCT TYPE: ICD-10-CM

## 2023-04-19 DIAGNOSIS — F33.9 DEPRESSION, RECURRENT (HCC): ICD-10-CM

## 2023-04-19 DIAGNOSIS — G89.4 PAIN SYNDROME, CHRONIC: ICD-10-CM

## 2023-04-19 DIAGNOSIS — J44.9 SEVERE CHRONIC OBSTRUCTIVE PULMONARY DISEASE (HCC): Primary | ICD-10-CM

## 2023-04-19 DIAGNOSIS — I10 ESSENTIAL HYPERTENSION: ICD-10-CM

## 2023-04-19 NOTE — PROGRESS NOTES
Name: Jolly Walekr      : 1960      MRN: [id number]  Encounter Provider: Jovana Ritter MD  Encounter Date: 2023   [facility]      1  Severe chronic obstructive pulmonary disease (HCC)  Assessment & Plan:  Declining function without flares  Continue Symbicort      2  Essential hypertension  Assessment & Plan:  Very good DM control    Orders:  -     CBC and differential; Future  -     Comprehensive metabolic panel; Future  -     TSH, 3rd generation with Free T4 reflex; Future    3  BMI 23 0-23 9, adult    4  Nicotine dependence with nicotine-induced disorder, unspecified nicotine product type    5  Spinal stenosis of lumbar region with neurogenic claudication    6  Pain syndrome, chronic  Assessment & Plan:  Declines Rx pain medication, doesn't help      7  Pure hypercholesterolemia  -     Lipid Panel with Direct LDL reflex; Future    8  Encounter for hepatitis C screening test for low risk patient  -     Hepatitis C antibody; Future    9  Screening for malignant neoplasm of prostate  -     PSA, Total Screen; Future    10  Depression, recurrent ([facility] [address] )  Assessment & Plan:  No acute flares, not interested in medication           Subjective     6 month followup    Seen with his wife    Hasnt smoked since on Varenicline     Has been using Albuterol more often than before    Using medical marijuana  Aleve down to a few a day    Had COVID about 2 months ago    Review of Systems   Constitutional: Positive for fatigue  Negative for unexpected weight change  Eyes: Negative for visual disturbance  Respiratory: Positive for cough, shortness of breath and wheezing  Cardiovascular: Negative  Gastrointestinal: Negative  Musculoskeletal: Positive for arthralgias, back pain and gait problem  Psychiatric/Behavioral: Positive for dysphoric mood  All other systems reviewed and are negative        Past Medical History: Diagnosis Date   • Abnormal weight loss     Last Assessed: 5/19/2017   • Alcohol abuse    • Arthritis    • Asthma    • COPD (chronic obstructive pulmonary disease) ([facility])    • Mental health problem    • Scabies     Last Assessed: 11/13/2014   • Substance abuse ([facility] [address] )      Past Surgical History:   Procedure Laterality Date   • BACK SURGERY     • LUMBAR FUSION       Family History   Problem Relation Age of Onset   • Prostate cancer Brother    • Oakhurst's disease Family         chorea    • Heart attack Family    • No Known Problems Mother      Social History     Socioeconomic History   • Marital status: /Civil Union     Spouse name: None   • Number of children: 2   • Years of education: highschool or G[employer]    • Highest education level: None   Occupational History   • Occupation:     Tobacco Use   • Smoking status: Every Day     Packs/day: 1 00     Types: Cigarettes     Start date: 1969   • Smokeless tobacco: Never   Vaping Use   • Vaping Use: Never used   Substance and Sexual Activity   • Alcohol use: Not Currently   • Drug use: Yes     Types: Marijuana     Comment: medical card   • Sexual activity: None   Other Topics Concern   • None   Social History Narrative    Per [employer]:     Always uses seat belt     Drinks coffee: drinks 4-12 ounce cups a day      Social Determinants of Health     Financial Resource Strain: Not on file   Food Insecurity: Not on file   Transportation Needs: Not on file   Physical Activity: Not on file   Stress: Not on file   Social Connections: Not on file   Intimate Partner Violence: Not on file   Housing Stability: Not on file     Current Outpatient Medications on File Prior to Visit   Medication Sig   • albuterol (PROVENTIL HFA,VENTOLIN HFA) 90 mcg/act inhaler INHALE 2 PUFFS BY MOUTH EVERY 4 HOURS AS NEEDED FOR WHEEZING   • budesonide-formoterol (Symbicort) 160-4 5 mcg/act inhaler Inhale 2 puffs 2 (two) times a day Rinse mouth after use     • umeclidinium bromide "(Incruse Ellipta) 62 5 mcg/inh AEPB inhaler Inhale 1 puff daily   • varenicline (CHANTIX) 1 mg tablet Take 1 tablet (1 mg total) by mouth 2 (two) times a day     No Known Allergies  Immunization History   Administered Date(s) Administered   • COVID-19 PFIZER VACCINE 0 3 ML IM 04/23/2021, 05/18/2021, 12/30/2021   • Influenza Quadrivalent, 6-35 Months IM 09/30/2016, 12/01/2017   • Influenza, recombinant, quadrivalent,injectable, preservative free 09/28/2018, 10/07/2019, 10/13/2020, 10/18/2021, 10/21/2022   • Pneumococcal Polysaccharide PPV23 12/01/2017   • Tdap 09/30/2016       Objective     /80 (BP Location: Right arm, Patient Position: Sitting, Cuff Size: Standard)   Pulse 94   Temp 97 5 °F (36 4 °C) (Tympanic)   Ht 5' 10\" (1 778 m)   Wt 73 7 kg (162 lb 6 4 oz)   SpO2 98%   BMI 23 30 kg/m²     Physical Exam  Vitals and nursing note reviewed  Neck:      Vascular: No carotid bruit  Cardiovascular:      Rate and Rhythm: Normal rate and regular rhythm  Heart sounds: Normal heart sounds  Pulmonary:      Breath sounds: Normal breath sounds  Musculoskeletal:      Right lower leg: No edema  Left lower leg: No edema  Neurological:      Mental Status: He is alert     Psychiatric:         Mood and Affect: Mood normal        Corrinne Prier, MD  "

## 2023-05-11 DIAGNOSIS — J44.9 CHRONIC OBSTRUCTIVE PULMONARY DISEASE, UNSPECIFIED COPD TYPE (HCC): ICD-10-CM

## 2023-05-11 RX ORDER — BUDESONIDE AND FORMOTEROL FUMARATE DIHYDRATE 160; 4.5 UG/1; UG/1
AEROSOL RESPIRATORY (INHALATION)
Qty: 33 G | Refills: 0 | Status: SHIPPED | OUTPATIENT
Start: 2023-05-11

## 2023-07-14 DIAGNOSIS — J44.9 CHRONIC OBSTRUCTIVE PULMONARY DISEASE, UNSPECIFIED COPD TYPE (HCC): ICD-10-CM

## 2023-07-14 RX ORDER — UMECLIDINIUM 62.5 UG/1
AEROSOL, POWDER ORAL
Qty: 30 EACH | Refills: 5 | Status: SHIPPED | OUTPATIENT
Start: 2023-07-14

## 2023-08-25 DIAGNOSIS — J44.9 CHRONIC OBSTRUCTIVE PULMONARY DISEASE, UNSPECIFIED COPD TYPE (HCC): ICD-10-CM

## 2023-08-25 RX ORDER — BUDESONIDE AND FORMOTEROL FUMARATE DIHYDRATE 160; 4.5 UG/1; UG/1
AEROSOL RESPIRATORY (INHALATION)
Qty: 11 G | Refills: 0 | Status: SHIPPED | OUTPATIENT
Start: 2023-08-25

## 2023-09-05 DIAGNOSIS — J44.9 SEVERE CHRONIC OBSTRUCTIVE PULMONARY DISEASE (HCC): ICD-10-CM

## 2023-09-05 RX ORDER — ALBUTEROL SULFATE 90 UG/1
AEROSOL, METERED RESPIRATORY (INHALATION)
Qty: 9 G | Refills: 0 | Status: SHIPPED | OUTPATIENT
Start: 2023-09-05

## 2023-09-25 DIAGNOSIS — J44.9 CHRONIC OBSTRUCTIVE PULMONARY DISEASE, UNSPECIFIED COPD TYPE (HCC): ICD-10-CM

## 2023-09-25 RX ORDER — BUDESONIDE AND FORMOTEROL FUMARATE DIHYDRATE 160; 4.5 UG/1; UG/1
AEROSOL RESPIRATORY (INHALATION)
Qty: 11 G | Refills: 0 | Status: SHIPPED | OUTPATIENT
Start: 2023-09-25

## 2023-10-02 DIAGNOSIS — J44.9 SEVERE CHRONIC OBSTRUCTIVE PULMONARY DISEASE (HCC): ICD-10-CM

## 2023-10-02 RX ORDER — ALBUTEROL SULFATE 90 UG/1
AEROSOL, METERED RESPIRATORY (INHALATION)
Qty: 9 G | Refills: 0 | Status: SHIPPED | OUTPATIENT
Start: 2023-10-02

## 2023-10-13 ENCOUNTER — RA CDI HCC (OUTPATIENT)
Dept: OTHER | Facility: HOSPITAL | Age: 63
End: 2023-10-13

## 2023-10-17 ENCOUNTER — OFFICE VISIT (OUTPATIENT)
Dept: FAMILY MEDICINE CLINIC | Facility: HOSPITAL | Age: 63
End: 2023-10-17
Payer: MEDICARE

## 2023-10-17 VITALS
HEART RATE: 91 BPM | HEIGHT: 70 IN | TEMPERATURE: 97.9 F | WEIGHT: 160.4 LBS | DIASTOLIC BLOOD PRESSURE: 80 MMHG | SYSTOLIC BLOOD PRESSURE: 140 MMHG | OXYGEN SATURATION: 94 % | BODY MASS INDEX: 22.96 KG/M2

## 2023-10-17 DIAGNOSIS — I10 ESSENTIAL HYPERTENSION: ICD-10-CM

## 2023-10-17 DIAGNOSIS — J44.9 SEVERE CHRONIC OBSTRUCTIVE PULMONARY DISEASE (HCC): ICD-10-CM

## 2023-10-17 DIAGNOSIS — J44.9 CHRONIC OBSTRUCTIVE PULMONARY DISEASE, UNSPECIFIED COPD TYPE (HCC): ICD-10-CM

## 2023-10-17 DIAGNOSIS — J44.1 BRONCHITIS, CHRONIC OBSTRUCTIVE, WITH EXACERBATION (HCC): Primary | ICD-10-CM

## 2023-10-17 DIAGNOSIS — F17.219 CIGARETTE NICOTINE DEPENDENCE WITH NICOTINE-INDUCED DISORDER: ICD-10-CM

## 2023-10-17 DIAGNOSIS — Z23 ENCOUNTER FOR IMMUNIZATION: ICD-10-CM

## 2023-10-17 DIAGNOSIS — M48.062 SPINAL STENOSIS OF LUMBAR REGION WITH NEUROGENIC CLAUDICATION: ICD-10-CM

## 2023-10-17 PROCEDURE — 99214 OFFICE O/P EST MOD 30 MIN: CPT | Performed by: FAMILY MEDICINE

## 2023-10-17 PROCEDURE — G0008 ADMIN INFLUENZA VIRUS VAC: HCPCS

## 2023-10-17 PROCEDURE — 90662 IIV NO PRSV INCREASED AG IM: CPT

## 2023-10-17 RX ORDER — DOXYCYCLINE HYCLATE 100 MG
100 TABLET ORAL 2 TIMES DAILY
Qty: 14 TABLET | Refills: 0 | Status: SHIPPED | OUTPATIENT
Start: 2023-10-17 | End: 2023-10-24

## 2023-10-17 RX ORDER — ALBUTEROL SULFATE 90 UG/1
1 AEROSOL, METERED RESPIRATORY (INHALATION) EVERY 6 HOURS PRN
Qty: 9 G | Refills: 3 | Status: SHIPPED | OUTPATIENT
Start: 2023-10-17

## 2023-10-17 RX ORDER — BUDESONIDE AND FORMOTEROL FUMARATE DIHYDRATE 160; 4.5 UG/1; UG/1
2 AEROSOL RESPIRATORY (INHALATION) 2 TIMES DAILY
Qty: 11 G | Refills: 5 | Status: SHIPPED | OUTPATIENT
Start: 2023-10-17

## 2023-10-17 RX ORDER — UMECLIDINIUM 62.5 UG/1
1 AEROSOL, POWDER ORAL DAILY
Qty: 30 EACH | Refills: 5 | Status: SHIPPED | OUTPATIENT
Start: 2023-10-17

## 2023-10-17 NOTE — PROGRESS NOTES
Name: Ronald Thurman      : 1960      MRN: [id number]  Encounter Provider: Lindsay Carlos MD  Encounter Date: 10/17/2023   Encounter department: [address]     1. Bronchitis, chronic obstructive, with exacerbation (HCC)  -     doxycycline hyclate (VIBRA-TABS) 100 mg tablet; Take 1 tablet (100 mg total) by mouth 2 (two) times a day for 7 days    2. Essential hypertension    3. Severe chronic obstructive pulmonary disease (HCC)  -     albuterol (PROVENTIL HFA,VENTOLIN HFA) 90 mcg/act inhaler; Inhale 1 puff every 6 (six) hours as needed for wheezing    4. BMI 23.0-23.9, adult    5. Cigarette nicotine dependence with nicotine-induced disorder    6. Spinal stenosis of lumbar region with neurogenic claudication    7. Chronic obstructive pulmonary disease, unspecified COPD type (HCC)  -     umeclidinium (Incruse Ellipta) 62.5 mcg/actuation AEPB inhaler; Inhale 1 puff daily  -     Symbicort 160-4.5 MCG/ACT inhaler; Inhale 2 puffs 2 (two) times a day Rinse mouth after use    8. Severe chronic obstructive pulmonary disease (HCC)  Comments:  Stable on current inhalers  Orders:  -     albuterol (PROVENTIL HFA,VENTOLIN HFA) 90 mcg/act inhaler; Inhale 1 puff every 6 (six) hours as needed for wheezing    9. Encounter for immunization  -     influenza vaccine, high-dose, PF 0.7 mL (FLUZONE HIGH-DOSE)           Subjective     6 month follow up. No recent injury or illness    Had COVID in February  Discussed need for COVID and RSV vaccines          Review of Systems   Constitutional:  Negative for unexpected weight change. Respiratory:  Positive for cough, shortness of breath and wheezing. Musculoskeletal:  Positive for arthralgias, back pain, gait problem and myalgias. All other systems reviewed and are negative.       Past Medical History:   Diagnosis Date    Abnormal weight loss     Last Assessed: 2017    Alcohol abuse     Arthritis     Asthma COPD (chronic obstructive pulmonary disease) ([facility])     Mental health problem     Scabies     Last Assessed: 11/13/2014    Substance abuse ([facility])      Past Surgical History:   Procedure Laterality Date    BACK SURGERY      LUMBAR FUSION       Family History   Problem Relation Age of Onset    Prostate cancer Brother     Geoff's disease Family         chorea     Heart attack Family     No Known Problems Mother      Social History     Socioeconomic History    Marital status: /Civil Union     Spouse name: None    Number of children: 2    Years of education: highschool or Ged     Highest education level: None   Occupational History    Occupation:     Tobacco Use    Smoking status: Every Day     Packs/day: 1.00     Types: Cigarettes     Start date: 1969    Smokeless tobacco: Never   Vaping Use    Vaping Use: Never used   Substance and Sexual Activity    Alcohol use: Not Currently    Drug use: Yes     Types: Marijuana     Comment: medical card    Sexual activity: None   Other Topics Concern    None   Social History Narrative    Per [employer]:     Always uses seat belt     Drinks coffee: drinks 4-12 ounce cups a day      Social Determinants of Health     Financial Resource Strain: Not on file   Food Insecurity: Not on file   Transportation Needs: Not on file   Physical Activity: Not on file   Stress: Not on file   Social Connections: Not on file   Intimate Partner Violence: Not on file   Housing Stability: Not on file     Current Outpatient Medications on File Prior to Visit   Medication Sig    varenicline (CHANTIX) 1 mg tablet Take 1 tablet (1 mg total) by mouth 2 (two) times a day     No Known Allergies  Immunization History   Administered Date(s) Administered    COVID-19 PFIZER VACCINE 0.3 ML IM 04/23/2021, 05/18/2021, 12/30/2021    Influenza Quadrivalent, 6-35 Months IM 09/30/2016, 12/01/2017    Influenza, high dose seasonal 0.7 mL 10/17/2023    Influenza, recombinant, quadrivalent,injectable, preservative free 09/28/2018, 10/07/2019, 10/13/2020, 10/18/2021, 10/21/2022    Pneumococcal Polysaccharide PPV23 12/01/2017    Tdap 09/30/2016       Objective     /80 (BP Location: Left arm, Patient Position: Sitting, Cuff Size: Standard)   Pulse 91   Temp 97.9 °F (36.6 °C) (Tympanic)   Ht 5' 10" (1.778 m)   Wt 72.8 kg (160 lb 6.4 oz)   SpO2 94%   BMI 23.02 kg/m²     Physical Exam  Vitals and nursing note reviewed. Constitutional:       Appearance: Normal appearance. Neck:      Vascular: No carotid bruit. Cardiovascular:      Rate and Rhythm: Normal rate and regular rhythm. Pulses: Normal pulses. Heart sounds: Normal heart sounds. Pulmonary:      Breath sounds: Wheezing, rhonchi and rales present. Musculoskeletal:      Right lower leg: No edema. Left lower leg: No edema. Neurological:      Mental Status: He is alert and oriented to person, place, and time.        Kathryn Burrell MD

## 2023-11-15 ENCOUNTER — TELEPHONE (OUTPATIENT)
Dept: FAMILY MEDICINE CLINIC | Facility: HOSPITAL | Age: 63
End: 2023-11-15

## 2023-11-15 DIAGNOSIS — J44.9 SEVERE CHRONIC OBSTRUCTIVE PULMONARY DISEASE (HCC): ICD-10-CM

## 2023-11-15 RX ORDER — ALBUTEROL SULFATE 90 UG/1
2 AEROSOL, METERED RESPIRATORY (INHALATION) EVERY 4 HOURS PRN
Qty: 9 G | Refills: 3 | Status: SHIPPED | OUTPATIENT
Start: 2023-11-15

## 2023-11-15 NOTE — TELEPHONE ENCOUNTER
When he was in you changed his albuterol to one puff every 6 hours and it should be two puffs. Pharmacy not refilling. Please redo the script. Call with update.

## 2024-01-17 ENCOUNTER — TELEPHONE (OUTPATIENT)
Dept: FAMILY MEDICINE CLINIC | Facility: HOSPITAL | Age: 64
End: 2024-01-17

## 2024-01-17 DIAGNOSIS — J44.9 CHRONIC OBSTRUCTIVE PULMONARY DISEASE, UNSPECIFIED COPD TYPE (HCC): ICD-10-CM

## 2024-01-17 RX ORDER — BUDESONIDE AND FORMOTEROL FUMARATE DIHYDRATE 160; 4.5 UG/1; UG/1
2 AEROSOL RESPIRATORY (INHALATION) 2 TIMES DAILY
Qty: 11 G | Refills: 5 | Status: CANCELLED | OUTPATIENT
Start: 2024-01-17

## 2024-01-17 NOTE — TELEPHONE ENCOUNTER
Please refill as generic so insurance will cover...    Symbicort 160-4.5 MCG/ACT inhaler       Refill to [facility] [place]

## 2024-01-19 DIAGNOSIS — J44.9 SEVERE CHRONIC OBSTRUCTIVE PULMONARY DISEASE (HCC): Primary | ICD-10-CM

## 2024-01-19 RX ORDER — BUDESONIDE AND FORMOTEROL FUMARATE DIHYDRATE 160; 4.5 UG/1; UG/1
2 AEROSOL RESPIRATORY (INHALATION) 2 TIMES DAILY
Qty: 10.2 G | Refills: 5 | Status: SHIPPED | OUTPATIENT
Start: 2024-01-19

## 2024-01-19 NOTE — TELEPHONE ENCOUNTER
Yes, my name is Zeus Gore. Also, I'm calling about a prescription change. I had called three days ago about getting a generic form of Symbicort. I still haven't. The prescription hasn't been called in and no one's called to let me know. I'd like to know what's going on. My phone number is [phone]. Again, my name is manual ban all stall. Thank you.  You received a voice mail from VIVIENNE MCMILLAN.

## 2024-03-02 DIAGNOSIS — J44.9 SEVERE CHRONIC OBSTRUCTIVE PULMONARY DISEASE (HCC): ICD-10-CM

## 2024-03-02 RX ORDER — ALBUTEROL SULFATE 90 UG/1
AEROSOL, METERED RESPIRATORY (INHALATION)
Qty: 9 G | Refills: 0 | Status: SHIPPED | OUTPATIENT
Start: 2024-03-02

## 2024-03-29 DIAGNOSIS — J44.9 SEVERE CHRONIC OBSTRUCTIVE PULMONARY DISEASE (HCC): ICD-10-CM

## 2024-03-29 RX ORDER — ALBUTEROL SULFATE 90 UG/1
AEROSOL, METERED RESPIRATORY (INHALATION)
Qty: 9 G | Refills: 0 | Status: SHIPPED | OUTPATIENT
Start: 2024-03-29

## 2024-04-16 ENCOUNTER — OFFICE VISIT (OUTPATIENT)
Dept: FAMILY MEDICINE CLINIC | Facility: HOSPITAL | Age: 64
End: 2024-04-16
Payer: COMMERCIAL

## 2024-04-16 VITALS
BODY MASS INDEX: 21.24 KG/M2 | HEART RATE: 91 BPM | WEIGHT: 148.4 LBS | DIASTOLIC BLOOD PRESSURE: 82 MMHG | TEMPERATURE: 97.8 F | HEIGHT: 70 IN | SYSTOLIC BLOOD PRESSURE: 142 MMHG

## 2024-04-16 DIAGNOSIS — J44.9 SEVERE CHRONIC OBSTRUCTIVE PULMONARY DISEASE (HCC): Primary | ICD-10-CM

## 2024-04-16 DIAGNOSIS — M48.062 SPINAL STENOSIS OF LUMBAR REGION WITH NEUROGENIC CLAUDICATION: ICD-10-CM

## 2024-04-16 DIAGNOSIS — I10 ESSENTIAL HYPERTENSION: ICD-10-CM

## 2024-04-16 DIAGNOSIS — F33.9 DEPRESSION, RECURRENT (HCC): ICD-10-CM

## 2024-04-16 DIAGNOSIS — F17.218 CIGARETTE NICOTINE DEPENDENCE WITH OTHER NICOTINE-INDUCED DISORDER: ICD-10-CM

## 2024-04-16 PROCEDURE — G2211 COMPLEX E/M VISIT ADD ON: HCPCS | Performed by: FAMILY MEDICINE

## 2024-04-16 PROCEDURE — 99214 OFFICE O/P EST MOD 30 MIN: CPT | Performed by: FAMILY MEDICINE

## 2024-04-16 NOTE — PROGRESS NOTES
Name: Zeus Miller      : 1960      MRN: [id number]  Encounter Provider: Zeus Pereyra MD  Encounter Date: 2024   Encounter department: [facility] CARE [address]     Assessment & Plan     1. Severe chronic obstructive pulmonary disease (HCC)  -     fluticasone-umeclidinium-vilanterol 100-62.5-25 mcg/actuation inhaler; Inhale 1 puff daily Rinse mouth after use.    2. Essential hypertension  Assessment & Plan:  Good BP control      3. Cigarette nicotine dependence with other nicotine-induced disorder  Assessment & Plan:  Urged D/C      4. Spinal stenosis of lumbar region with neurogenic claudication    5. BMI 23.0-23.9, adult    6. Depression, recurrent ([facility])        Depression Screening and Follow-up Plan: Patient assessed for underlying major depression. Brief counseling provided and recommend additional follow-up/re-evaluation next office visit.     Tobacco Cessation Counseling: Tobacco cessation counseling was not provided. The patient is sincerely urged to quit consumption of tobacco. He is not ready to quit tobacco.         Subjective     6 month follow up.    No recent illness or injury    Aware of weight loss- he feels it is from breathing problems  Some brownish sputum, has to work to get it out.  R sided chest pain he thinks from coughing      Review of Systems   Constitutional:  Positive for fatigue and unexpected weight change.   Respiratory:  Positive for cough, chest tightness, shortness of breath and wheezing.    Cardiovascular: Negative.    Gastrointestinal: Negative.    Genitourinary: Negative.    Musculoskeletal:  Positive for arthralgias, back pain and gait problem.   Psychiatric/Behavioral: Negative.     All other systems reviewed and are negative.      Past Medical History:   Diagnosis Date    Abnormal weight loss     Last Assessed: 2017    Alcohol abuse     Arthritis     Asthma     COPD (chronic obstructive pulmonary disease) ([facility])     Mental health  problem     Scabies     Last Assessed: 11/13/2014    Substance abuse (HCC)      Past Surgical History:   Procedure Laterality Date    BACK SURGERY      LUMBAR FUSION       Family History   Problem Relation Age of Onset    Prostate cancer Brother     Geoff's disease Family         chorea     Heart attack Family     No Known Problems Mother      Social History     Socioeconomic History    Marital status: /Civil Union     Spouse name: None    Number of children: 2    Years of education: highschool or G[employer]     Highest education level: None   Occupational History    Occupation:     Tobacco Use    Smoking status: Every Day     Current packs/day: 1.00     Average packs/day: 1 pack/day for 55.3 years (55.3 ttl pk-yrs)     Types: Cigarettes     Start date: 1969    Smokeless tobacco: Never   Vaping Use    Vaping status: Never Used   Substance and Sexual Activity    Alcohol use: Not Currently    Drug use: Yes     Types: Marijuana     Comment: medical card    Sexual activity: None   Other Topics Concern    None   Social History Narrative    Per [employer]:     Always uses seat belt     Drinks coffee: drinks 4-12 ounce cups a day      Social Determinants of Health     Financial Resource Strain: Not on file   Food Insecurity: Not on file   Transportation Needs: Not on file   Physical Activity: Not on file   Stress: Not on file   Social Connections: Not on file   Intimate Partner Violence: Not on file   Housing Stability: Not on file     Current Outpatient Medications on File Prior to Visit   Medication Sig    varenicline (CHANTIX) 1 mg tablet Take 1 tablet (1 mg total) by mouth 2 (two) times a day    [DISCONTINUED] albuterol (PROVENTIL HFA,VENTOLIN HFA) 90 mcg/act inhaler INHALE 2 PUFFS BY MOUTH EVERY 4 HOURS AS NEEDED FOR WHEEZING     No Known Allergies  Immunization History   Administered Date(s) Administered    COVID-19 PFIZER VACCINE 0.3 ML IM 04/23/2021, 05/18/2021, 12/30/2021    Influenza  "Quadrivalent, 6-35 Months IM 09/30/2016, 12/01/2017    Influenza, high dose seasonal 0.7 mL 10/17/2023    Influenza, recombinant, quadrivalent,injectable, preservative free 09/28/2018, 10/07/2019, 10/13/2020, 10/18/2021, 10/21/2022    Pneumococcal Polysaccharide PPV23 12/01/2017    Tdap 09/30/2016       Objective     /82   Pulse 91   Temp 97.8 °F (36.6 °C)   Ht 5' 10\" (1.778 m)   Wt 67.3 kg (148 lb 6.4 oz)   BMI 21.29 kg/m²     Physical Exam  Vitals and nursing note reviewed.   Neck:      Vascular: No carotid bruit.   Cardiovascular:      Rate and Rhythm: Regular rhythm.      Heart sounds: Normal heart sounds.   Pulmonary:      Breath sounds: Wheezing and rhonchi present.   Musculoskeletal:      Right lower leg: No edema.      Left lower leg: No edema.   Psychiatric:         Mood and Affect: Mood normal.       Zeus Pereyra MD    "

## 2024-04-18 DIAGNOSIS — J44.9 SEVERE CHRONIC OBSTRUCTIVE PULMONARY DISEASE (HCC): ICD-10-CM

## 2024-04-18 RX ORDER — ALBUTEROL SULFATE 90 UG/1
AEROSOL, METERED RESPIRATORY (INHALATION)
Qty: 9 G | Refills: 0 | Status: SHIPPED | OUTPATIENT
Start: 2024-04-18

## 2024-05-05 DIAGNOSIS — J44.9 SEVERE CHRONIC OBSTRUCTIVE PULMONARY DISEASE (HCC): ICD-10-CM

## 2024-05-07 RX ORDER — ALBUTEROL SULFATE 90 UG/1
AEROSOL, METERED RESPIRATORY (INHALATION)
Qty: 9 G | Refills: 5 | Status: SHIPPED | OUTPATIENT
Start: 2024-05-07

## 2024-08-02 ENCOUNTER — TELEPHONE (OUTPATIENT)
Age: 64
End: 2024-08-02

## 2024-08-02 DIAGNOSIS — F17.209 NICOTINE DEPENDENCE WITH NICOTINE-INDUCED DISORDER, UNSPECIFIED NICOTINE PRODUCT TYPE: ICD-10-CM

## 2024-08-02 RX ORDER — VARENICLINE TARTRATE 1 MG/1
1 TABLET, FILM COATED ORAL 2 TIMES DAILY
Qty: 60 TABLET | Refills: 2 | Status: SHIPPED | OUTPATIENT
Start: 2024-08-02

## 2024-08-02 NOTE — TELEPHONE ENCOUNTER
Pt would like prescription  for generic Chantix to be sent to [facility]/pharmacy #[id number] - [place], [place] - [address]  Pt state he was prescribed that in the past and it was too expensive, now he has secondary insurance so the it will be more affordable

## 2024-08-19 DIAGNOSIS — J44.9 SEVERE CHRONIC OBSTRUCTIVE PULMONARY DISEASE (HCC): ICD-10-CM

## 2024-08-19 RX ORDER — ALBUTEROL SULFATE 90 UG/1
AEROSOL, METERED RESPIRATORY (INHALATION)
Qty: 9 G | Refills: 0 | Status: SHIPPED | OUTPATIENT
Start: 2024-08-19 | End: 2024-08-21 | Stop reason: SDUPTHER

## 2024-08-19 NOTE — TELEPHONE ENCOUNTER
Patient called in to check the status of refill. Patient made aware that we are waiting for approval.   Patient's inhaler broke and he is out of the medication.

## 2024-08-21 ENCOUNTER — OFFICE VISIT (OUTPATIENT)
Dept: FAMILY MEDICINE CLINIC | Facility: HOSPITAL | Age: 64
End: 2024-08-21
Payer: COMMERCIAL

## 2024-08-21 VITALS
HEIGHT: 70 IN | SYSTOLIC BLOOD PRESSURE: 118 MMHG | DIASTOLIC BLOOD PRESSURE: 80 MMHG | WEIGHT: 150 LBS | HEART RATE: 101 BPM | TEMPERATURE: 98 F | OXYGEN SATURATION: 95 % | BODY MASS INDEX: 21.47 KG/M2

## 2024-08-21 DIAGNOSIS — I10 ESSENTIAL HYPERTENSION: Primary | ICD-10-CM

## 2024-08-21 DIAGNOSIS — M48.062 SPINAL STENOSIS OF LUMBAR REGION WITH NEUROGENIC CLAUDICATION: ICD-10-CM

## 2024-08-21 DIAGNOSIS — J44.9 SEVERE CHRONIC OBSTRUCTIVE PULMONARY DISEASE (HCC): ICD-10-CM

## 2024-08-21 DIAGNOSIS — F17.219 CIGARETTE NICOTINE DEPENDENCE WITH NICOTINE-INDUCED DISORDER: ICD-10-CM

## 2024-08-21 PROCEDURE — 99214 OFFICE O/P EST MOD 30 MIN: CPT | Performed by: FAMILY MEDICINE

## 2024-08-21 PROCEDURE — G2211 COMPLEX E/M VISIT ADD ON: HCPCS | Performed by: FAMILY MEDICINE

## 2024-08-21 RX ORDER — ALBUTEROL SULFATE 90 UG/1
2 AEROSOL, METERED RESPIRATORY (INHALATION) EVERY 4 HOURS PRN
Qty: 9 G | Refills: 5 | Status: SHIPPED | OUTPATIENT
Start: 2024-08-21

## 2024-08-21 NOTE — PROGRESS NOTES
Ambulatory Visit  Name: Zeus Miller      : 1960      MRN: [id number]  Encounter Provider: Zeus Pereyra MD  Encounter Date: 2024   Encounter department: [facility] CARE [address]     Assessment & Plan   1. Essential hypertension  2. Cigarette nicotine dependence with nicotine-induced disorder  3. Spinal stenosis of lumbar region with neurogenic claudication  4. Severe chronic obstructive pulmonary disease (HCC)  -     albuterol (PROVENTIL HFA,VENTOLIN HFA) 90 mcg/act inhaler; Inhale 2 puffs every 4 (four) hours as needed for wheezing Please keep refills on file, not due yet.  5. Severe chronic obstructive pulmonary disease (HCC)  Comments:  Stable on current inhalers  Orders:  -     albuterol (PROVENTIL HFA,VENTOLIN HFA) 90 mcg/act inhaler; Inhale 2 puffs every 4 (four) hours as needed for wheezing Please keep refills on file, not due yet.  6. BMI 21.0-21.9, adult      Depression Screening and Follow-up Plan: Patient assessed for underlying major depression. Brief counseling provided and recommend additional follow-up/re-evaluation next office visit.         History of Present Illness     4 month follow up.    Doing OK with generic Chantix, not smoking for the past month   Coughing up sputum, mostly white    Unable to do anything he used to do because of his back, failed surgery      Review of Systems   Constitutional:  Negative for unexpected weight change.   Respiratory:  Positive for shortness of breath and wheezing.    Cardiovascular: Negative.    Gastrointestinal: Negative.    Genitourinary: Negative.    Musculoskeletal:  Positive for arthralgias, back pain, gait problem, joint swelling and myalgias.   Psychiatric/Behavioral:  Positive for dysphoric mood.    All other systems reviewed and are negative.    Past Medical History:   Diagnosis Date    Abnormal weight loss     Last Assessed: 2017    Alcohol abuse     Arthritis     Asthma     COPD (chronic obstructive  "pulmonary disease) ([facility])     Mental health problem     Scabies     Last Assessed: 11/13/2014    Substance abuse ([facility])      Past Surgical History:   Procedure Laterality Date    BACK SURGERY      LUMBAR FUSION       Family History   Problem Relation Age of Onset    Prostate cancer Brother     Geoff's disease Family         chorea     Heart attack Family     No Known Problems Mother      Social History     Tobacco Use    Smoking status: Every Day     Current packs/day: 1.00     Average packs/day: 1 pack/day for 55.6 years (55.6 ttl pk-yrs)     Types: Cigarettes     Start date: 1969    Smokeless tobacco: Never   Vaping Use    Vaping status: Never Used   Substance and Sexual Activity    Alcohol use: Yes     Comment: socially    Drug use: Yes     Types: Marijuana     Comment: medical card    Sexual activity: Not on file     Current Outpatient Medications on File Prior to Visit   Medication Sig    fluticasone-umeclidinium-vilanterol 100-62.5-25 mcg/actuation inhaler Inhale 1 puff daily Rinse mouth after use. (Patient not taking: Reported on 8/21/2024)     No Known Allergies  Immunization History   Administered Date(s) Administered    COVID-19 PFIZER VACCINE 0.3 ML IM 04/23/2021, 05/18/2021, 12/30/2021    Influenza Quadrivalent, 6-35 Months IM 09/30/2016, 12/01/2017    Influenza, high dose seasonal 0.7 mL 10/17/2023    Influenza, recombinant, quadrivalent,injectable, preservative free 09/28/2018, 10/07/2019, 10/13/2020, 10/18/2021, 10/21/2022    Pneumococcal Polysaccharide PPV23 12/01/2017    Tdap 09/30/2016     Objective     /80 (BP Location: Left arm, Patient Position: Sitting, Cuff Size: Standard)   Pulse 101   Temp 98 °F (36.7 °C) (Tympanic)   Ht 5' 10\" (1.778 m)   Wt 68 kg (150 lb)   SpO2 95%   BMI 21.52 kg/m²     Physical Exam  Vitals and nursing note reviewed.   Constitutional:       Appearance: Normal appearance.   Cardiovascular:      Rate and Rhythm: Regular rhythm.      Heart sounds: Normal " heart sounds.   Pulmonary:      Effort: Pulmonary effort is normal.      Breath sounds: Wheezing present. No rales.   Musculoskeletal:      Right lower leg: No edema.      Left lower leg: No edema.   Neurological:      Mental Status: He is alert and oriented to person, place, and time.

## 2024-09-01 DIAGNOSIS — J44.9 SEVERE CHRONIC OBSTRUCTIVE PULMONARY DISEASE (HCC): ICD-10-CM

## 2024-09-03 RX ORDER — FLUTICASONE FUROATE, UMECLIDINIUM BROMIDE AND VILANTEROL TRIFENATATE 100; 62.5; 25 UG/1; UG/1; UG/1
POWDER RESPIRATORY (INHALATION)
Qty: 60 EACH | Refills: 3 | Status: SHIPPED | OUTPATIENT
Start: 2024-09-03

## 2024-09-12 ENCOUNTER — TELEPHONE (OUTPATIENT)
Age: 64
End: 2024-09-12

## 2024-09-12 DIAGNOSIS — F17.211 CIGARETTE NICOTINE DEPENDENCE IN REMISSION: Primary | ICD-10-CM

## 2024-09-12 RX ORDER — VARENICLINE TARTRATE 1 MG/1
1 TABLET, FILM COATED ORAL 2 TIMES DAILY
Qty: 60 TABLET | Refills: 1 | Status: SHIPPED | OUTPATIENT
Start: 2024-09-12

## 2024-09-12 NOTE — TELEPHONE ENCOUNTER
Patient call because the medication he was on varenicline (CHANTIX) 1 mg tablet was cancel. Patient is still taking this medication and need it to help not some. Please assist the patient with trying to get this medication once again because he run out and does not have any more for today. Thank you

## 2024-09-13 ENCOUNTER — OFFICE VISIT (OUTPATIENT)
Dept: FAMILY MEDICINE CLINIC | Facility: HOSPITAL | Age: 64
End: 2024-09-13
Payer: COMMERCIAL

## 2024-09-13 VITALS
HEIGHT: 70 IN | OXYGEN SATURATION: 96 % | SYSTOLIC BLOOD PRESSURE: 130 MMHG | DIASTOLIC BLOOD PRESSURE: 82 MMHG | HEART RATE: 60 BPM | RESPIRATION RATE: 16 BRPM | BODY MASS INDEX: 21.76 KG/M2 | TEMPERATURE: 97.2 F | WEIGHT: 152 LBS

## 2024-09-13 DIAGNOSIS — Z71.6 ENCOUNTER FOR SMOKING CESSATION COUNSELING: Primary | ICD-10-CM

## 2024-09-13 DIAGNOSIS — Z53.20 LUNG CANCER SCREENING DECLINED BY PATIENT: ICD-10-CM

## 2024-09-13 DIAGNOSIS — J44.9 SEVERE CHRONIC OBSTRUCTIVE PULMONARY DISEASE (HCC): ICD-10-CM

## 2024-09-13 PROCEDURE — 99214 OFFICE O/P EST MOD 30 MIN: CPT

## 2024-09-13 RX ORDER — NAPROXEN SODIUM 220 MG
TABLET ORAL
COMMUNITY

## 2024-09-13 NOTE — ASSESSMENT & PLAN NOTE
PFTs reviewed demonstrating severe obstructive disease.  Labs reviewed, no evidence of eosinophilia on recent CBC.  Patient reports good response to current regimen of Trelegy controller and albuterol as needed.  Normal lung exam with no wheezing.  Reports reduction in cough since stopping smoking    -Continue Trelegy controller inhaler and albuterol rescue inhaler as needed

## 2024-09-13 NOTE — PROGRESS NOTES
Ambulatory Visit  Name: Zeus Miller      : 1960      MRN: [id number]  Encounter Provider: Solo Taveras MD  Encounter Date: 2024   Encounter department: [facility] CARE [address]    Assessment & Plan  Encounter for smoking cessation counseling  Patient reports a partial response to Chantix and has not had a cigarette in 2 weeks, he reports improved but still significant cravings.  He wants to know how long he should take his current dose of Chantix    -Recommend continuing Chantix 1 mg twice daily for as long as he is still experiencing cravings and/or least the recommended 12 weeks as tolerated     -Recommend AAA screening with ultrasound, patient declined today, but will think about it.  Handout provided, plan to readdress at next visit  Lung cancer screening declined by patient  Counseled on benefits of lung cancer screening with low-dose CT including reduced mortality.  Patient declined today but said he will think about it    -Readdress at next visit, advised to schedule AWV, handout provided on lung screening program       Severe chronic obstructive pulmonary disease (HCC)  PFTs reviewed demonstrating severe obstructive disease.  Labs reviewed, no evidence of eosinophilia on recent CBC.  Patient reports good response to current regimen of Trelegy controller and albuterol as needed.  Normal lung exam with no wheezing.  Reports reduction in cough since stopping smoking    -Continue Trelegy controller inhaler and albuterol rescue inhaler as needed          History of Present Illness     HPI  Patient is a 64-year-old male who presents to discuss smoking cessation.  His last cigarette was 2 weeks ago.  He reports a good response to Chantix.  He still reports significant cravings but says they have reduced in intensity.  He also reports an improvement in his chronic cough.        Review of Systems   Respiratory:  Positive for cough (chronic, improved). Negative for  "shortness of breath and wheezing.    Psychiatric/Behavioral:  Negative for sleep disturbance.            Objective     /82   Pulse 60   Temp (!) 97.2 °F (36.2 °C) (Tympanic)   Resp 16   Ht 5' 10\" (1.778 m)   Wt 68.9 kg (152 lb)   SpO2 96%   BMI 21.81 kg/m²     Physical Exam  Vitals and nursing note reviewed.   Constitutional:       General: He is not in acute distress.     Appearance: He is well-developed.   HENT:      Head: Normocephalic and atraumatic.   Eyes:      Conjunctiva/sclera: Conjunctivae normal.   Cardiovascular:      Rate and Rhythm: Normal rate and regular rhythm.      Heart sounds: No murmur heard.  Pulmonary:      Effort: Pulmonary effort is normal. No respiratory distress.      Breath sounds: Normal breath sounds. No stridor. No wheezing, rhonchi or rales.   Neurological:      Mental Status: He is alert.         "

## 2024-10-15 ENCOUNTER — OFFICE VISIT (OUTPATIENT)
Dept: FAMILY MEDICINE CLINIC | Facility: HOSPITAL | Age: 64
End: 2024-10-15
Payer: COMMERCIAL

## 2024-10-15 VITALS
HEART RATE: 94 BPM | HEIGHT: 70 IN | DIASTOLIC BLOOD PRESSURE: 80 MMHG | OXYGEN SATURATION: 96 % | WEIGHT: 156.8 LBS | BODY MASS INDEX: 22.45 KG/M2 | TEMPERATURE: 97.5 F | SYSTOLIC BLOOD PRESSURE: 140 MMHG

## 2024-10-15 DIAGNOSIS — J44.9 SEVERE CHRONIC OBSTRUCTIVE PULMONARY DISEASE (HCC): ICD-10-CM

## 2024-10-15 DIAGNOSIS — Z23 ENCOUNTER FOR IMMUNIZATION: ICD-10-CM

## 2024-10-15 DIAGNOSIS — Z00.00 MEDICARE ANNUAL WELLNESS VISIT, SUBSEQUENT: Primary | ICD-10-CM

## 2024-10-15 DIAGNOSIS — Z23 NEED FOR COVID-19 VACCINE: ICD-10-CM

## 2024-10-15 PROCEDURE — G0008 ADMIN INFLUENZA VIRUS VAC: HCPCS

## 2024-10-15 PROCEDURE — 90677 PCV20 VACCINE IM: CPT

## 2024-10-15 PROCEDURE — 90662 IIV NO PRSV INCREASED AG IM: CPT

## 2024-10-15 PROCEDURE — G0009 ADMIN PNEUMOCOCCAL VACCINE: HCPCS

## 2024-10-15 PROCEDURE — 90480 ADMN SARSCOV2 VAC 1/ONLY CMP: CPT

## 2024-10-15 PROCEDURE — G0439 PPPS, SUBSEQ VISIT: HCPCS

## 2024-10-15 PROCEDURE — 91320 SARSCV2 VAC 30MCG TRS-SUC IM: CPT

## 2024-10-15 RX ORDER — ALBUTEROL SULFATE 90 UG/1
2 INHALANT RESPIRATORY (INHALATION) EVERY 4 HOURS PRN
Qty: 9 G | Refills: 5 | Status: SHIPPED | OUTPATIENT
Start: 2024-10-15

## 2024-10-15 NOTE — PROGRESS NOTES
Ambulatory Visit  Name: Zeus Miller      : 1960      MRN: [id number]  Encounter Provider: Solo Hargrove MD  Encounter Date: 10/15/2024   Encounter department: [facility] CARE [address]    Assessment & Plan  Medicare annual wellness visit, subsequent         Severe chronic obstructive pulmonary disease (HCC)  Patient reports chronic dyspnea, adherent to Trelegy and albuterol inhalers.  Pulm referral offered and declined  Orders:    albuterol (PROVENTIL HFA,VENTOLIN HFA) 90 mcg/act inhaler; Inhale 2 puffs every 4 (four) hours as needed for wheezing Please keep refills on file, not due yet.    Encounter for immunization  Also recommend shingles shot which patient can get at his pharmacy  Orders:    influenza vaccine, high-dose, PF 0.5 mL (Fluzone High Dose)    Pneumococcal Conjugate Vaccine 20-valent (Pcv20)    Need for COVID-19 vaccine    Orders:    COVID-19 Pfizer mRNA vaccine 12 yr and older (Comirnaty pre-filled syringe)     Patient declined screening for lung cancer, dyslipidemia (lipid panel), diabetes (A1c), and prostate cancer      Preventive health issues were discussed with patient, and age appropriate screening tests were ordered as noted in patient's After Visit Summary. Personalized health advice and appropriate referrals for health education or preventive services given if needed, as noted in patient's After Visit Summary.    History of Present Illness     HPI   Patient Care Team:  Solo Hargrove MD as PCP - General (Family Medicine)  DO Karel Chairez CRNP Daryl Gordon, CRNP Livia Bratis, DO Doron Rabin, MD Karen Winter, CRNP    Review of Systems   Respiratory:  Positive for shortness of breath (chronic).    Cardiovascular:  Negative for chest pain and palpitations.   Musculoskeletal:  Positive for back pain (chronic).     Medical History Reviewed by provider this encounter:  Meds  Problems       Annual Wellness Visit Questionnaire   Zeus  is here for his Subsequent Wellness visit.     Health Risk Assessment:   Patient rates overall health as poor. Patient feels that their physical health rating is slightly worse. Patient is satisfied with their life. Eyesight was rated as slightly worse. Hearing was rated as slightly worse. Patient feels that their emotional and mental health rating is same. Patients states they are sometimes angry. Patient states they are often unusually tired/fatigued. Pain experienced in the last 7 days has been a lot. Patient's pain rating has been 7/10. Patient states that he has experienced no weight loss or gain in last 6 months.     Depression Screening:   PHQ-9 Score: 5      Fall Risk Screening:   In the past year, patient has experienced: no history of falling in past year      Home Safety:  Patient has trouble with stairs inside or outside of their home. Patient has working smoke alarms and has no working carbon monoxide detector. Home safety hazards include: none. Patient is able to safely navigate up steps into house and 2nd floor bedroom    Nutrition:   Current diet is Regular.     Medications:   Patient is not currently taking any over-the-counter supplements. Patient is able to manage medications.     Activities of Daily Living (ADLs)/Instrumental Activities of Daily Living (IADLs):   Walk and transfer into and out of bed and chair?: Yes  Dress and groom yourself?: Yes    Bathe or shower yourself?: Yes    Feed yourself? Yes  Do your laundry/housekeeping?: Yes  Manage your money, pay your bills and track your expenses?: Yes  Make your own meals?: Yes    Do your own shopping?: Yes    Previous Hospitalizations:   Any hospitalizations or ED visits within the last 12 months?: No      PREVENTIVE SCREENINGS      Cardiovascular Screening:    General: Screening Current      Diabetes Screening:     General: Patient Declines      Colorectal Cancer Screening:     General: Screening Current      Prostate Cancer Screening:     "General: Screening Not Indicated      Osteoporosis Screening:    General: Screening Not Indicated      Abdominal Aortic Aneurysm (AAA) Screening:    Risk factors include: age between 65-74 yo and tobacco use        Lung Cancer Screening:     General: Patient Declines      Hepatitis C Screening:    General: Patient Declines    Screening, Brief Intervention, and Referral to Treatment (SBIRT)    Screening  Typical number of drinks in a day: 0  Typical number of drinks in a week: 0  Interpretation: Low risk drinking behavior.    Social Determinants of Health     Food Insecurity: No Food Insecurity (10/15/2024)    Hunger Vital Sign     Worried About Running Out of Food in the Last Year: Never true     Ran Out of Food in the Last Year: Never true   Transportation Needs: No Transportation Needs (10/15/2024)    PRAPARE - Transportation     Lack of Transportation (Medical): No     Lack of Transportation (Non-Medical): No   Housing Stability: Low Risk  (10/15/2024)    Housing Stability Vital Sign     Unable to Pay for Housing in the Last Year: No     Number of Times Moved in the Last Year: 0     Homeless in the Last Year: No   Utilities: Not At Risk (10/15/2024)    [facility] Utilities     Threatened with loss of utilities: No     No results found.    Objective     /80   Pulse 94   Temp 97.5 °F (36.4 °C)   Ht 5' 10\" (1.778 m)   Wt 71.1 kg (156 lb 12.8 oz)   SpO2 96%   BMI 22.50 kg/m²     Physical Exam  Vitals and nursing note reviewed.   Constitutional:       General: He is not in acute distress.     Appearance: He is well-developed.   HENT:      Head: Normocephalic and atraumatic.   Cardiovascular:      Rate and Rhythm: Normal rate and regular rhythm.      Heart sounds: Normal heart sounds. No murmur heard.  Pulmonary:      Effort: Pulmonary effort is normal. No respiratory distress.      Breath sounds: Normal breath sounds. No stridor. No wheezing, rhonchi or rales.   Skin:     General: Skin is warm and dry. "   Neurological:      Mental Status: He is alert and oriented to person, place, and time.   Psychiatric:         Mood and Affect: Mood normal.         Behavior: Behavior normal.

## 2024-10-15 NOTE — ASSESSMENT & PLAN NOTE
Patient reports chronic dyspnea, adherent to Trelegy and albuterol inhalers.  Pulm referral offered and declined  Orders:    albuterol (PROVENTIL HFA,VENTOLIN HFA) 90 mcg/act inhaler; Inhale 2 puffs every 4 (four) hours as needed for wheezing Please keep refills on file, not due yet.

## 2024-10-15 NOTE — PATIENT INSTRUCTIONS
Medicare Preventive Visit Patient Instructions  Thank you for completing your Welcome to Medicare Visit or Medicare Annual Wellness Visit today. Your next wellness visit will be due in one year (10/16/2025).  The screening/preventive services that you may require over the next 5-10 years are detailed below. Some tests may not apply to you based off risk factors and/or age. Screening tests ordered at today's visit but not completed yet may show as past due. Also, please note that scanned in results may not display below.  Preventive Screenings:  Service Recommendations Previous Testing/Comments   Colorectal Cancer Screening  Colonoscopy    Fecal Occult Blood Test (FOBT)/Fecal Immunochemical Test (FIT)  Fecal DNA/Cologuard Test  Flexible Sigmoidoscopy Age: 45-75 years old   Colonoscopy: every 10 years (May be performed more frequently if at higher risk)  OR  FOBT/FIT: every 1 year  OR  Cologuard: every 3 years  OR  Sigmoidoscopy: every 5 years  Screening may be recommended earlier than age 45 if at higher risk for colorectal cancer. Also, an individualized decision between you and your healthcare provider will decide whether screening between the ages of 76-85 would be appropriate. Colonoscopy: 05/27/2016  FOBT/FIT: Not on file  Cologuard: Not on file  Sigmoidoscopy: Not on file    Screening Current     Prostate Cancer Screening Individualized decision between patient and health care provider in men between ages of 55-69   Medicare will cover every 12 months beginning on the day after your 50th birthday PSA: No results in last 5 years     Screening Not Indicated     Hepatitis C Screening Once for adults born between 1945 and 1965  More frequently in patients at high risk for Hepatitis C Hep C Antibody: Not on file    Patient Declines   Diabetes Screening 1-2 times per year if you're at risk for diabetes or have pre-diabetes Fasting glucose: No results in last 5 years (No results in last 5 years)  A1C: No results in  last 5 years (No results in last 5 years)  Patient Declines   Cholesterol Screening Once every 5 years if you don't have a lipid disorder. May order more often based on risk factors. Lipid panel: 06/17/2020  Screening Current      Other Preventive Screenings Covered by Medicare:  Abdominal Aortic Aneurysm (AAA) Screening: covered once if your at risk. You're considered to be at risk if you have a family history of AAA or a male between the age of 65-75 who smoking at least 100 cigarettes in your lifetime.  Lung Cancer Screening: covers low dose CT scan once per year if you meet all of the following conditions: (1) Age 55-77; (2) No signs or symptoms of lung cancer; (3) Current smoker or have quit smoking within the last 15 years; (4) You have a tobacco smoking history of at least 20 pack years (packs per day x number of years you smoked); (5) You get a written order from a healthcare provider.  Glaucoma Screening: covered annually if you're considered high risk: (1) You have diabetes OR (2) Family history of glaucoma OR (3)  aged 50 and older OR (4)  American aged 65 and older  Osteoporosis Screening: covered every 2 years if you meet one of the following conditions: (1) Have a vertebral abnormality; (2) On glucocorticoid therapy for more than 3 months; (3) Have primary hyperparathyroidism; (4) On osteoporosis medications and need to assess response to drug therapy.  HIV Screening: covered annually if you're between the age of 15-65. Also covered annually if you are younger than 15 and older than 65 with risk factors for HIV infection. For pregnant patients, it is covered up to 3 times per pregnancy.    Immunizations:  Immunization Recommendations   Influenza Vaccine Annual influenza vaccination during flu season is recommended for all persons aged >= 6 months who do not have contraindications   Pneumococcal Vaccine   * Pneumococcal conjugate vaccine = PCV13 (Prevnar 13), PCV15 (Vaxneuvance),  PCV20 (Prevnar 20)  * Pneumococcal polysaccharide vaccine = PPSV23 (Pneumovax) Adults 19-63 yo with certain risk factors or if 65+ yo  If never received any pneumonia vaccine: recommend Prevnar 20 (PCV20)  Give PCV20 if previously received 1 dose of PCV13 or PPSV23   Hepatitis B Vaccine 3 dose series if at intermediate or high risk (ex: diabetes, end stage renal disease, liver disease)   Respiratory syncytial virus (RSV) Vaccine - COVERED BY MEDICARE PART D  * RSVPreF3 (Arexvy) CDC recommends that adults 60 years of age and older may receive a single dose of RSV vaccine using shared clinical decision-making (SCDM)   Tetanus (Td) Vaccine - COST NOT COVERED BY MEDICARE PART B Following completion of primary series, a booster dose should be given every 10 years to maintain immunity against tetanus. Td may also be given as tetanus wound prophylaxis.   Tdap Vaccine - COST NOT COVERED BY MEDICARE PART B Recommended at least once for all adults. For pregnant patients, recommended with each pregnancy.   Shingles Vaccine (Shingrix) - COST NOT COVERED BY MEDICARE PART B  2 shot series recommended in those 19 years and older who have or will have weakened immune systems or those 50 years and older     Health Maintenance Due:      Topic Date Due   • Hepatitis C Screening  Never done   • Lung Cancer Screening  Never done   • Colorectal Cancer Screening  05/27/2026     Immunizations Due:      Topic Date Due   • Hepatitis A Vaccine (1 of 2 - Risk 2-dose series) Never done     Advance Directives   What are advance directives?  Advance directives are legal documents that state your wishes and plans for medical care. These plans are made ahead of time in case you lose your ability to make decisions for yourself. Advance directives can apply to any medical decision, such as the treatments you want, and if you want to donate organs.   What are the types of advance directives?  There are many types of advance directives, and each state  has rules about how to use them. You may choose a combination of any of the following:  Living will:  This is a written record of the treatment you want. You can also choose which treatments you do not want, which to limit, and which to stop at a certain time. This includes surgery, medicine, IV fluid, and tube feedings.   Durable power of  for healthcare (DPAHC):  This is a written record that states who you want to make healthcare choices for you when you are unable to make them for yourself. This person, called a proxy, is usually a family member or a friend. You may choose more than 1 proxy.  Do not resuscitate (DNR) order:  A DNR order is used in case your heart stops beating or you stop breathing. It is a request not to have certain forms of treatment, such as CPR. A DNR order may be included in other types of advance directives.  Medical directive:  This covers the care that you want if you are in a coma, near death, or unable to make decisions for yourself. You can list the treatments you want for each condition. Treatment may include pain medicine, surgery, blood transfusions, dialysis, IV or tube feedings, and a ventilator (breathing machine).  Values history:  This document has questions about your views, beliefs, and how you feel and think about life. This information can help others choose the care that you would choose.  Why are advance directives important?  An advance directive helps you control your care. Although spoken wishes may be used, it is better to have your wishes written down. Spoken wishes can be misunderstood, or not followed. Treatments may be given even if you do not want them. An advance directive may make it easier for your family to make difficult choices about your care.       © Copyright [employer] 2018 Information is for End User's use only and may not be sold, redistributed or otherwise used for commercial purposes. All illustrations and images included in  CareNotes® are the copyrighted property of [employer] or [employer]    Medicare Preventive Visit Patient Instructions  Thank you for completing your Welcome to Medicare Visit or Medicare Annual Wellness Visit today. Your next wellness visit will be due in one year (10/16/2025).  The screening/preventive services that you may require over the next 5-10 years are detailed below. Some tests may not apply to you based off risk factors and/or age. Screening tests ordered at today's visit but not completed yet may show as past due. Also, please note that scanned in results may not display below.  Preventive Screenings:  Service Recommendations Previous Testing/Comments   Colorectal Cancer Screening  Colonoscopy    Fecal Occult Blood Test (FOBT)/Fecal Immunochemical Test (FIT)  Fecal DNA/Cologuard Test  Flexible Sigmoidoscopy Age: 45-75 years old   Colonoscopy: every 10 years (May be performed more frequently if at higher risk)  OR  FOBT/FIT: every 1 year  OR  Cologuard: every 3 years  OR  Sigmoidoscopy: every 5 years  Screening may be recommended earlier than age 45 if at higher risk for colorectal cancer. Also, an individualized decision between you and your healthcare provider will decide whether screening between the ages of 76-85 would be appropriate. Colonoscopy: 05/27/2016  FOBT/FIT: Not on file  Cologuard: Not on file  Sigmoidoscopy: Not on file    Screening Current     Prostate Cancer Screening Individualized decision between patient and health care provider in men between ages of 55-69   Medicare will cover every 12 months beginning on the day after your 50th birthday PSA: No results in last 5 years     Screening Not Indicated     Hepatitis C Screening Once for adults born between 1945 and 1965  More frequently in patients at high risk for Hepatitis C Hep C Antibody: Not on file    Patient Declines   Diabetes Screening 1-2 times per year if you're at risk for diabetes or have pre-diabetes Fasting  glucose: No results in last 5 years (No results in last 5 years)  A1C: No results in last 5 years (No results in last 5 years)  Patient Declines   Cholesterol Screening Once every 5 years if you don't have a lipid disorder. May order more often based on risk factors. Lipid panel: 06/17/2020  Screening Current      Other Preventive Screenings Covered by Medicare:  Abdominal Aortic Aneurysm (AAA) Screening: covered once if your at risk. You're considered to be at risk if you have a family history of AAA or a male between the age of 65-75 who smoking at least 100 cigarettes in your lifetime.  Lung Cancer Screening: covers low dose CT scan once per year if you meet all of the following conditions: (1) Age 55-77; (2) No signs or symptoms of lung cancer; (3) Current smoker or have quit smoking within the last 15 years; (4) You have a tobacco smoking history of at least 20 pack years (packs per day x number of years you smoked); (5) You get a written order from a healthcare provider.  Glaucoma Screening: covered annually if you're considered high risk: (1) You have diabetes OR (2) Family history of glaucoma OR (3)  aged 50 and older OR (4)  American aged 65 and older  Osteoporosis Screening: covered every 2 years if you meet one of the following conditions: (1) Have a vertebral abnormality; (2) On glucocorticoid therapy for more than 3 months; (3) Have primary hyperparathyroidism; (4) On osteoporosis medications and need to assess response to drug therapy.  HIV Screening: covered annually if you're between the age of 15-65. Also covered annually if you are younger than 15 and older than 65 with risk factors for HIV infection. For pregnant patients, it is covered up to 3 times per pregnancy.    Immunizations:  Immunization Recommendations   Influenza Vaccine Annual influenza vaccination during flu season is recommended for all persons aged >= 6 months who do not have contraindications   Pneumococcal  Vaccine   * Pneumococcal conjugate vaccine = PCV13 (Prevnar 13), PCV15 (Vaxneuvance), PCV20 (Prevnar 20)  * Pneumococcal polysaccharide vaccine = PPSV23 (Pneumovax) Adults 19-63 yo with certain risk factors or if 65+ yo  If never received any pneumonia vaccine: recommend Prevnar 20 (PCV20)  Give PCV20 if previously received 1 dose of PCV13 or PPSV23   Hepatitis B Vaccine 3 dose series if at intermediate or high risk (ex: diabetes, end stage renal disease, liver disease)   Respiratory syncytial virus (RSV) Vaccine - COVERED BY MEDICARE PART D  * RSVPreF3 (Arexvy) CDC recommends that adults 60 years of age and older may receive a single dose of RSV vaccine using shared clinical decision-making (SCDM)   Tetanus (Td) Vaccine - COST NOT COVERED BY MEDICARE PART B Following completion of primary series, a booster dose should be given every 10 years to maintain immunity against tetanus. Td may also be given as tetanus wound prophylaxis.   Tdap Vaccine - COST NOT COVERED BY MEDICARE PART B Recommended at least once for all adults. For pregnant patients, recommended with each pregnancy.   Shingles Vaccine (Shingrix) - COST NOT COVERED BY MEDICARE PART B  2 shot series recommended in those 19 years and older who have or will have weakened immune systems or those 50 years and older     Health Maintenance Due:      Topic Date Due   • Hepatitis C Screening  Never done   • Lung Cancer Screening  Never done   • Colorectal Cancer Screening  05/27/2026     Immunizations Due:      Topic Date Due   • Hepatitis A Vaccine (1 of 2 - Risk 2-dose series) Never done     Advance Directives   What are advance directives?  Advance directives are legal documents that state your wishes and plans for medical care. These plans are made ahead of time in case you lose your ability to make decisions for yourself. Advance directives can apply to any medical decision, such as the treatments you want, and if you want to donate organs.   What are the  types of advance directives?  There are many types of advance directives, and each state has rules about how to use them. You may choose a combination of any of the following:  Living will:  This is a written record of the treatment you want. You can also choose which treatments you do not want, which to limit, and which to stop at a certain time. This includes surgery, medicine, IV fluid, and tube feedings.   Durable power of  for healthcare (DPAHC):  This is a written record that states who you want to make healthcare choices for you when you are unable to make them for yourself. This person, called a proxy, is usually a family member or a friend. You may choose more than 1 proxy.  Do not resuscitate (DNR) order:  A DNR order is used in case your heart stops beating or you stop breathing. It is a request not to have certain forms of treatment, such as CPR. A DNR order may be included in other types of advance directives.  Medical directive:  This covers the care that you want if you are in a coma, near death, or unable to make decisions for yourself. You can list the treatments you want for each condition. Treatment may include pain medicine, surgery, blood transfusions, dialysis, IV or tube feedings, and a ventilator (breathing machine).  Values history:  This document has questions about your views, beliefs, and how you feel and think about life. This information can help others choose the care that you would choose.  Why are advance directives important?  An advance directive helps you control your care. Although spoken wishes may be used, it is better to have your wishes written down. Spoken wishes can be misunderstood, or not followed. Treatments may be given even if you do not want them. An advance directive may make it easier for your family to make difficult choices about your care.       © Copyright [employer] 2018 Information is for End User's use only and may not be sold, redistributed or  otherwise used for commercial purposes. All illustrations and images included in CareNotes® are the copyrighted property of [employer] or [employer]

## 2024-11-27 ENCOUNTER — TELEPHONE (OUTPATIENT)
Dept: FAMILY MEDICINE CLINIC | Facility: HOSPITAL | Age: 64
End: 2024-11-27

## 2024-11-27 DIAGNOSIS — F17.211 CIGARETTE NICOTINE DEPENDENCE IN REMISSION: ICD-10-CM

## 2024-11-27 RX ORDER — VARENICLINE TARTRATE 1 MG/1
1 TABLET, FILM COATED ORAL 2 TIMES DAILY
Qty: 60 TABLET | Refills: 1 | Status: SHIPPED | OUTPATIENT
Start: 2024-11-27

## 2024-11-27 NOTE — TELEPHONE ENCOUNTER
Patient called the RX Refill Line. Message is being forwarded to the office.     Patient is requesting a refill of the chantix but said that he's now due for the decreased dose of it  Would like it sent to the [facility] on file     Please contact patient at  if any questions

## 2025-01-16 DIAGNOSIS — J44.9 SEVERE CHRONIC OBSTRUCTIVE PULMONARY DISEASE (HCC): ICD-10-CM

## 2025-01-16 RX ORDER — ALBUTEROL SULFATE 90 UG/1
2 INHALANT RESPIRATORY (INHALATION) EVERY 4 HOURS PRN
Qty: 9 G | Refills: 5 | Status: SHIPPED | OUTPATIENT
Start: 2025-01-16

## 2025-01-16 RX ORDER — ALBUTEROL SULFATE 90 UG/1
2 INHALANT RESPIRATORY (INHALATION) EVERY 4 HOURS PRN
Qty: 9 G | Refills: 0 | Status: SHIPPED | OUTPATIENT
Start: 2025-01-16 | End: 2025-01-16

## 2025-01-16 NOTE — TELEPHONE ENCOUNTER
Reason for call:   [x] Refill   [] Prior Auth  [] Other:     Office:   [] PCP/Provider - [facility]   [] Specialty/Provider -     Medication: albuterol (PROVENTIL HFA,VENTOLIN HFA) 90 mcg/act inhaler    Dose/Frequency: 2 puff, Every 4 hours PRN     Quantity: 9 g    Pharmacy: [id number]    Does the patient have enough for 3 days?   [] Yes   [x] No - Send as HP to POD

## 2025-03-18 DIAGNOSIS — J44.9 SEVERE CHRONIC OBSTRUCTIVE PULMONARY DISEASE (HCC): ICD-10-CM

## 2025-03-18 RX ORDER — FLUTICASONE FUROATE, UMECLIDINIUM BROMIDE AND VILANTEROL TRIFENATATE 100; 62.5; 25 UG/1; UG/1; UG/1
1 POWDER RESPIRATORY (INHALATION) DAILY
Qty: 60 EACH | Refills: 3 | Status: SHIPPED | OUTPATIENT
Start: 2025-03-18

## 2025-03-18 NOTE — TELEPHONE ENCOUNTER
Reason for call:   [x] Refill   [] Prior Auth  [] Other:     Office:   [x] PCP/Provider -     [facility] [address]      [] Specialty/Provider -     Medication: Trelegy    Dose/Frequency: 100-62.5-25 mcg inhaler    Quantity: #60    Pharmacy: [facility] [address]    Local Pharmacy   Does the patient have enough for 3 days?   [] Yes   [x] No - Send as HP to POD    Mail Away Pharmacy   Does the patient have enough for 10 days?   [] Yes   [] No - Send as HP to POD

## 2025-05-18 DIAGNOSIS — J44.9 SEVERE CHRONIC OBSTRUCTIVE PULMONARY DISEASE (HCC): ICD-10-CM

## 2025-05-18 RX ORDER — ALBUTEROL SULFATE 90 UG/1
INHALANT RESPIRATORY (INHALATION)
Qty: 9 G | Refills: 5 | Status: SHIPPED | OUTPATIENT
Start: 2025-05-18

## 2025-07-11 DIAGNOSIS — J44.9 SEVERE CHRONIC OBSTRUCTIVE PULMONARY DISEASE (HCC): ICD-10-CM

## 2025-07-14 RX ORDER — FLUTICASONE FUROATE, UMECLIDINIUM BROMIDE AND VILANTEROL TRIFENATATE 100; 62.5; 25 UG/1; UG/1; UG/1
1 POWDER RESPIRATORY (INHALATION) DAILY
Qty: 60 EACH | Refills: 0 | Status: SHIPPED | OUTPATIENT
Start: 2025-07-14